# Patient Record
Sex: FEMALE | Race: WHITE | NOT HISPANIC OR LATINO | Employment: PART TIME | URBAN - METROPOLITAN AREA
[De-identification: names, ages, dates, MRNs, and addresses within clinical notes are randomized per-mention and may not be internally consistent; named-entity substitution may affect disease eponyms.]

---

## 2017-04-05 RX ORDER — FLUOXETINE HYDROCHLORIDE 40 MG/1
CAPSULE ORAL
Qty: 90 CAPSULE | Refills: 0 | Status: SHIPPED | OUTPATIENT
Start: 2017-04-05 | End: 2017-07-07 | Stop reason: SDUPTHER

## 2017-04-06 DIAGNOSIS — Z00.00 HEALTH CARE MAINTENANCE: Primary | ICD-10-CM

## 2017-06-15 ENCOUNTER — TELEPHONE (OUTPATIENT)
Dept: INTERNAL MEDICINE | Facility: CLINIC | Age: 52
End: 2017-06-15

## 2017-06-15 RX ORDER — FLUCONAZOLE 150 MG/1
TABLET ORAL
Qty: 2 TABLET | Refills: 1 | Status: SHIPPED | OUTPATIENT
Start: 2017-06-15 | End: 2017-09-01

## 2017-06-15 NOTE — TELEPHONE ENCOUNTER
----- Message from Maisha Nunn sent at 6/15/2017  8:41 AM EDT -----  Contact: pt - Dr Woodruff's pt - RE: new Rx  Pt calling and would like an abx called to pt's pharmacy. Pt informs had dental work and was given an abx that caused yeast infection. Pt would like a Rx for Diflucan. Could you please call to pharmacy? Please advise? Thanks      Diflucan    [facility] [place] [address] AT [facility] [address] - [phone] PH - [phone] (Phone)  [phone] (Fax)    Pt # [phone]

## 2017-06-22 DIAGNOSIS — G35 MULTIPLE SCLEROSIS (HCC): Primary | ICD-10-CM

## 2017-07-10 RX ORDER — FLUOXETINE HYDROCHLORIDE 40 MG/1
CAPSULE ORAL
Qty: 90 CAPSULE | Refills: 0 | Status: SHIPPED | OUTPATIENT
Start: 2017-07-10 | End: 2017-09-01 | Stop reason: SDUPTHER

## 2017-08-21 ENCOUNTER — TELEPHONE (OUTPATIENT)
Dept: INTERNAL MEDICINE | Facility: CLINIC | Age: 52
End: 2017-08-21

## 2017-08-21 DIAGNOSIS — E55.9 VITAMIN D DEFICIENCY: Primary | ICD-10-CM

## 2017-08-21 NOTE — TELEPHONE ENCOUNTER
----- Message from Maisha ISELA Nunn sent at 8/21/2017 11:48 AM EDT -----  Contact: pt - Dr Woodruff's pt - RE: Vit D added to lab order  Pt calling and would like to know if pt can have Vit D added to lab order. Pt informs has MS and would like to have checked. Pt has appt tomorrow at 2:15pm. Could you please add to pt's lab order? Please advise. Thanks      Pt # [phone]

## 2017-08-22 ENCOUNTER — LAB (OUTPATIENT)
Dept: INTERNAL MEDICINE | Facility: CLINIC | Age: 52
End: 2017-08-22

## 2017-08-22 DIAGNOSIS — Z00.00 HEALTH CARE MAINTENANCE: ICD-10-CM

## 2017-08-22 DIAGNOSIS — E55.9 VITAMIN D DEFICIENCY: ICD-10-CM

## 2017-08-22 LAB
25(OH)D3 SERPL-MCNC: 22.3 NG/ML (ref 30–100)
ALBUMIN SERPL-MCNC: 3.88 G/DL (ref 3.4–4.6)
ALBUMIN/GLOB SERPL: 1.1 G/DL
ALP SERPL-CCNC: 115 U/L (ref 46–116)
ALT SERPL W P-5'-P-CCNC: 21 U/L (ref 14–59)
ANION GAP SERPL CALCULATED.3IONS-SCNC: 12 MMOL/L
AST SERPL-CCNC: 13 U/L (ref 7–37)
BACTERIA UR QL AUTO: ABNORMAL /HPF
BASOPHILS # BLD AUTO: 0.01 10*3/MM3 (ref 0–0.2)
BASOPHILS NFR BLD AUTO: 0.1 % (ref 0–2)
BILIRUB SERPL-MCNC: 0.4 MG/DL (ref 0.2–1)
BILIRUB UR QL STRIP: NEGATIVE
BUN BLD-MCNC: 16 MG/DL (ref 6–22)
BUN/CREAT SERPL: 17.8 (ref 7–25)
CALCIUM SPEC-SCNC: 9.2 MG/DL (ref 8.6–10.5)
CHLORIDE SERPL-SCNC: 106 MMOL/L (ref 95–107)
CHOLEST SERPL-MCNC: 228 MG/DL (ref 0–200)
CLARITY UR: CLEAR
CO2 SERPL-SCNC: 24 MMOL/L (ref 23–32)
COLOR UR: YELLOW
CREAT BLD-MCNC: 0.9 MG/DL (ref 0.55–1.02)
DEPRECATED RDW RBC AUTO: 41.4 FL (ref 37–54)
EOSINOPHIL # BLD AUTO: 0.23 10*3/MM3 (ref 0–0.7)
EOSINOPHIL NFR BLD AUTO: 3.2 % (ref 0–5)
ERYTHROCYTE [DISTWIDTH] IN BLOOD BY AUTOMATED COUNT: 12.7 % (ref 11.5–15)
GFR SERPL CREATININE-BSD FRML MDRD: 65 ML/MIN/1.73
GLOBULIN UR ELPH-MCNC: 3.5 GM/DL
GLUCOSE BLD-MCNC: 90 MG/DL (ref 70–100)
GLUCOSE UR STRIP-MCNC: NEGATIVE MG/DL
HCT VFR BLD AUTO: 38.8 % (ref 34.1–44.9)
HDLC SERPL-MCNC: 62 MG/DL (ref 40–81)
HGB BLD-MCNC: 13.1 G/DL (ref 11.2–15.7)
HGB UR QL STRIP.AUTO: ABNORMAL
HYALINE CASTS UR QL AUTO: ABNORMAL /LPF
KETONES UR QL STRIP: NEGATIVE
LDLC SERPL CALC-MCNC: 148 MG/DL (ref 0–100)
LDLC/HDLC SERPL: 2.39 {RATIO}
LEUKOCYTE ESTERASE UR QL STRIP.AUTO: NEGATIVE
LYMPHOCYTES # BLD AUTO: 3.17 10*3/MM3 (ref 0.8–7)
LYMPHOCYTES NFR BLD AUTO: 44 % (ref 10–60)
MCH RBC QN AUTO: 30.8 PG (ref 26–34)
MCHC RBC AUTO-ENTMCNC: 33.8 G/DL (ref 31–37)
MCV RBC AUTO: 91.1 FL (ref 80–100)
MONOCYTES # BLD AUTO: 0.46 10*3/MM3 (ref 0–1)
MONOCYTES NFR BLD AUTO: 6.4 % (ref 0–13)
NEUTROPHILS # BLD AUTO: 3.33 10*3/MM3 (ref 1–11)
NEUTROPHILS NFR BLD AUTO: 46.3 % (ref 30–85)
NITRITE UR QL STRIP: NEGATIVE
PH UR STRIP.AUTO: 5.5 [PH] (ref 5–8)
PLATELET # BLD AUTO: 276 10*3/MM3 (ref 150–450)
PMV BLD AUTO: 10.4 FL (ref 6–12)
POTASSIUM BLD-SCNC: 4 MMOL/L (ref 3.3–5.3)
PROT SERPL-MCNC: 7.4 G/DL (ref 6.3–8.4)
PROT UR QL STRIP: NEGATIVE
RBC # BLD AUTO: 4.26 10*6/MM3 (ref 3.93–5.22)
RBC # UR: ABNORMAL /HPF
REF LAB TEST METHOD: ABNORMAL
SODIUM BLD-SCNC: 142 MMOL/L (ref 136–145)
SP GR UR STRIP: >=1.03 (ref 1–1.03)
SQUAMOUS #/AREA URNS HPF: ABNORMAL /HPF
TRIGL SERPL-MCNC: 89 MG/DL (ref 0–150)
TSH SERPL-ACNC: 1.84 MIU/ML (ref 0.27–4.2)
UROBILINOGEN UR QL STRIP: ABNORMAL
VLDLC SERPL-MCNC: 17.8 MG/DL
WBC NRBC COR # BLD: 7.2 10*3/MM3 (ref 5–10)
WBC UR QL AUTO: ABNORMAL /HPF

## 2017-08-22 PROCEDURE — 85025 COMPLETE CBC W/AUTO DIFF WBC: CPT | Performed by: INTERNAL MEDICINE

## 2017-08-22 PROCEDURE — 80061 LIPID PANEL: CPT | Performed by: INTERNAL MEDICINE

## 2017-08-22 PROCEDURE — 80053 COMPREHEN METABOLIC PANEL: CPT | Performed by: INTERNAL MEDICINE

## 2017-08-22 PROCEDURE — 81001 URINALYSIS AUTO W/SCOPE: CPT | Performed by: INTERNAL MEDICINE

## 2017-09-01 ENCOUNTER — OFFICE VISIT (OUTPATIENT)
Dept: INTERNAL MEDICINE | Facility: CLINIC | Age: 52
End: 2017-09-01

## 2017-09-01 VITALS
BODY MASS INDEX: 43.71 KG/M2 | WEIGHT: 272 LBS | RESPIRATION RATE: 14 BRPM | HEIGHT: 66 IN | SYSTOLIC BLOOD PRESSURE: 138 MMHG | DIASTOLIC BLOOD PRESSURE: 80 MMHG

## 2017-09-01 DIAGNOSIS — Z12.31 VISIT FOR SCREENING MAMMOGRAM: ICD-10-CM

## 2017-09-01 DIAGNOSIS — F41.8 MIXED ANXIETY DEPRESSIVE DISORDER: ICD-10-CM

## 2017-09-01 DIAGNOSIS — Z78.0 POST-MENOPAUSE: ICD-10-CM

## 2017-09-01 DIAGNOSIS — R31.29 MICROSCOPIC HEMATURIA: ICD-10-CM

## 2017-09-01 DIAGNOSIS — G47.33 OBSTRUCTIVE SLEEP APNEA SYNDROME: ICD-10-CM

## 2017-09-01 DIAGNOSIS — E78.00 PURE HYPERCHOLESTEROLEMIA: ICD-10-CM

## 2017-09-01 DIAGNOSIS — Z23 NEED FOR PNEUMOCOCCAL VACCINATION: ICD-10-CM

## 2017-09-01 DIAGNOSIS — E55.9 VITAMIN D DEFICIENCY: ICD-10-CM

## 2017-09-01 DIAGNOSIS — Z00.00 HEALTH CARE MAINTENANCE: Primary | ICD-10-CM

## 2017-09-01 PROCEDURE — 90471 IMMUNIZATION ADMIN: CPT | Performed by: INTERNAL MEDICINE

## 2017-09-01 PROCEDURE — 99396 PREV VISIT EST AGE 40-64: CPT | Performed by: INTERNAL MEDICINE

## 2017-09-01 PROCEDURE — 90732 PPSV23 VACC 2 YRS+ SUBQ/IM: CPT | Performed by: INTERNAL MEDICINE

## 2017-09-01 RX ORDER — ATORVASTATIN CALCIUM 10 MG/1
10 TABLET, FILM COATED ORAL DAILY
Qty: 90 TABLET | Refills: 3 | Status: SHIPPED | OUTPATIENT
Start: 2017-09-01

## 2017-09-01 RX ORDER — CYCLOBENZAPRINE HCL 10 MG
TABLET ORAL
COMMUNITY
Start: 2017-07-11

## 2017-09-01 RX ORDER — FLUOXETINE HYDROCHLORIDE 20 MG/1
60 CAPSULE ORAL DAILY
Qty: 270 CAPSULE | Refills: 3 | Status: SHIPPED | OUTPATIENT
Start: 2017-09-01 | End: 2018-10-18 | Stop reason: SDUPTHER

## 2017-09-01 NOTE — PATIENT INSTRUCTIONS
Risk evaluation:  1. Cardiovascular risk factors: hyperlipidemia, tobacco abuse, obesity, sedentary life style . As patient is a smoker, her LDL target sis below 70. As her LDL is 148, will start Atorvastatin and reevaluate in 3 months.  2. Diabetes risk factors: obesity and sedentary life style.  3. Cancer risk factors: tobacco smoking.  4. Risky behavior: none. Use of seat belts: regular. Use of sunscreens: none, patient states that practices sun avoidance.  Tattoos: none. H/o blood transfusions/organ transplants before 1992 or clotting factor transfusion before 1987: none.     Prevention:  Cholesterol test  up to date. See discussion above, will start Atorvastatin.  Blood sugar test up to date. Normal fasting blood sugar.  Hep C testing (for patients born [birth year]79): discussed and recommended, will proceed with testing..  Mammogram is due. Will schedule.. Breast self exams recommended once a month.  Colonoscopy: up to date. Recommended every 10 years. Next screening is due in 2023..  PAP smear : not recommended, as patient is s/p hysterectomy..  DEXA : is due, will schedule. Benefits explained..                         Hyperlipidemia - target LDL is below 70. Start Atorvastavin, avoid red meat.    RANJANA - still not rested in am - refer to .    Anxiety d/o with panic attacks - will try to increase Prozac dose to 60 mg a day.    Vitamin D deficiency -  Start over the counter it D3 2000 IU a day, needs to be taken with food.      Probable COPD - advised to quit tobacco, risks explained.     Microscopic hematuria - I had explained to the patient that tobacco smoking increases her risk of bladder cancer. Patient states that she and cysto done by  back in 2011, I  have no s-ms. Will refer back to urologist.

## 2017-09-01 NOTE — PROGRESS NOTES
"Isabel Nieves is a 52 y.o. female.     History of Present Illness   /80 (BP Location: Left arm, Patient Position: Sitting, Cuff Size: Adult)  Resp 14  Ht 65.5\" (166.4 cm)  Wt 272 lb (123 kg)  BMI 44.57 kg/m2  Patient is here for yearly CPE. Last CPE was  1 year ago.  PMH, SH and FH reviewed. Changes in the FH: none .  Patient rates her own health as: good.  Tobacco use: current smoker. Smokes 10 cigarettes a day.  Alcohol use: seldom.  Recreational drugs use: none  Medication list rewieved.  Diet: regular.  Exercise: none.  Marital status: .   Employment: full time.  Patient rates her stress level as: high. Just lost her mother who had deementia.  Dental health: good. Brushes teeth 2 times a day, flosses 1 time a day . Dental visits: 2 times a year .  Vision correction: glasses  Hearing: normal.    Recent vaccinations:   Flu  is up to date and recommended yearly  Tdap  is up to date  Zostavax not recommended at this age    Patient is postmenopausal. Past pregnancies: . Currently patient is on no HRT .      Current Outpatient Prescriptions:   •  cyclobenzaprine (FLEXERIL) 10 MG tablet, , Disp: , Rfl:   •  FLUoxetine (PROzac) 40 MG capsule, TAKE ONE CAPSULE BY MOUTH ONE TIME DAILY, Disp: 90 capsule, Rfl: 0  •  HYDROcodone-acetaminophen (NORCO) 7.5-325 MG per tablet, Take 1 tablet by mouth 2 (two) times a day., Disp: , Rfl:   •  ibuprofen (ADVIL,MOTRIN) 800 MG tablet, Take 1 tablet by mouth as needed., Disp: , Rfl:   •  Multiple Vitamin (MULTIVITAMINS PO), Take 1 tablet by mouth daily., Disp: , Rfl:           The following portions of the patient's history were reviewed and updated as appropriate: allergies, current medications, past family history, past medical history, past social history, past surgical history and problem list.    Review of Systems   Constitutional: Negative for chills and fever.   HENT: Negative for postnasal drip, sinus pressure and sore throat.    Eyes: " "Negative for pain and itching.   Respiratory: Negative for cough and chest tightness.    Cardiovascular: Positive for leg swelling. Negative for chest pain.   Gastrointestinal: Negative for abdominal pain and blood in stool.   Endocrine: Negative for cold intolerance and heat intolerance.   Genitourinary: Negative for difficulty urinating and flank pain.   Musculoskeletal: Positive for back pain and neck pain.   Skin: Negative for color change and rash.   Neurological: Positive for dizziness and headaches. Negative for weakness. Seizures: patient c/o absence seisures.   Hematological: Negative for adenopathy. Does not bruise/bleed easily.   Psychiatric/Behavioral: Positive for confusion (\"mental fog\"). Negative for sleep disturbance. The patient is nervous/anxious (has panic attacks).        Objective   Physical Exam   Constitutional: She is oriented to person, place, and time. She appears well-developed. No distress.   obese   HENT:   Head: Normocephalic and atraumatic.   Right Ear: External ear normal.   Left Ear: External ear normal.   Nose: Nose normal.   Mouth/Throat: Oropharynx is clear and moist. No oropharyngeal exudate.   Eyes: Conjunctivae and EOM are normal. Pupils are equal, round, and reactive to light. Right eye exhibits no discharge. Left eye exhibits no discharge. No scleral icterus.   Neck: Normal range of motion. Neck supple. No JVD present. No thyromegaly present.   Cardiovascular: Normal rate, regular rhythm, S1 normal, S2 normal, normal heart sounds and intact distal pulses.  Exam reveals no gallop and no friction rub.    No murmur heard.  Pulmonary/Chest: Effort normal. No respiratory distress. She has no decreased breath sounds. Wheezes: over the left base. She has no rhonchi. She has no rales. Right breast exhibits no inverted nipple, no mass, no nipple discharge, no skin change and no tenderness. Left breast exhibits no inverted nipple, no mass, no nipple discharge, no skin change and no " tenderness. Breasts are symmetrical.   Prolonged respiratory phase   Abdominal: Soft. Bowel sounds are normal. She exhibits no distension and no mass. There is no tenderness. There is no rebound and no guarding.   Musculoskeletal: She exhibits no edema.   Lymphadenopathy:        Head (right side): No submental, no submandibular, no preauricular, no posterior auricular and no occipital adenopathy present.        Head (left side): No submental, no submandibular, no preauricular, no posterior auricular and no occipital adenopathy present.     She has no cervical adenopathy.        Right cervical: No superficial cervical, no deep cervical and no posterior cervical adenopathy present.       Left cervical: No superficial cervical, no deep cervical and no posterior cervical adenopathy present.     She has no axillary adenopathy.        Right: No supraclavicular adenopathy present.        Left: No supraclavicular adenopathy present.   Neurological: She is alert and oriented to person, place, and time. She has normal reflexes. She displays normal reflexes. No cranial nerve deficit. She exhibits normal muscle tone. Coordination normal.   Reflex Scores:       Bicep reflexes are 2+ on the right side and 2+ on the left side.       Patellar reflexes are 2+ on the right side and 2+ on the left side.  Skin: Skin is warm. No rash noted. She is not diaphoretic. No erythema.   multicolored tattoo over the right ankle   Psychiatric: She has a normal mood and affect. Her behavior is normal. Thought content normal.   Vitals reviewed.      Assessment/Plan   Annemarie was seen today for annual exam.    Diagnoses and all orders for this visit:    Health care maintenance  -     Hepatitis C Antibody; Future    Need for pneumococcal vaccination  -     Pneumococcal Polysaccharide Vaccine 23-Valent Greater Than or Equal To 3yo Subcutaneous / IM    Microscopic hematuria  -     Ambulatory Referral to Urology    Pure hypercholesterolemia  -      Comprehensive Metabolic Panel; Future  -     Lipid Panel; Future  -     atorvastatin (LIPITOR) 10 MG tablet; Take 1 tablet by mouth Daily.    Vitamin D deficiency  -     Vitamin D 25 Hydroxy; Future    Post-menopause  -     DEXA Bone Density Axial; Future    Visit for screening mammogram  -     Mammo Screening Bilateral With CAD    Obstructive sleep apnea syndrome  -     Ambulatory Referral to Sleep Lab      Risk evaluation:  1. Cardiovascular risk factors: hyperlipidemia, tobacco abuse, obesity, sedentary life style . As patient is a smoker, her LDL target sis below 70. As her LDL is 148, will start Atorvastatin and reevaluate in 3 months.  2. Diabetes risk factors: obesity and sedentary life style.  3. Cancer risk factors: tobacco smoking.  4. Risky behavior: none. Use of seat belts: regular. Use of sunscreens: none, patient states that practices sun avoidance.  Tattoos: one. H/o blood transfusions/organ transplants before 1992 or clotting factor transfusion before 1987: none.     Prevention:  Cholesterol test  up to date. See discussion above, will start Atorvastatin.  Blood sugar test up to date. Normal fasting blood sugar.  Hep C testing (for patients born [birth year]): discussed and recommended, will proceed with testing..  Mammogram is due. Will schedule.. Breast self exams recommended once a month.  Colonoscopy: up to date. Recommended every 10 years. Next screening is due in 2023..  PAP smear : not recommended, as patient is s/p hysterectomy..  DEXA : is due, will schedule. Benefits explained.       Hyperlipidemia - target LDL is below 70. Start Atorvastavin, avoid red meat.    RANJANA - still not rested in am - refer to .    Anxiety d/o with panic attacks - will try to increase Prozac dose to 60 mg a day.    Vitamin D deficiency -  Start over the counter it D3 2000 IU a day, needs to be taken with food.      Probable COPD - advised to quit tobacco, risks explained.     Microscopic hematuria - I had  explained to the patient that tobacco smoking increases her risk of bladder cancer. Patient states that she and cysto done by  back in 2011, I  have no s-ms. Will refer back to urologist.

## 2017-09-15 ENCOUNTER — HOSPITAL ENCOUNTER (OUTPATIENT)
Dept: SLEEP MEDICINE | Facility: HOSPITAL | Age: 52
Discharge: HOME | End: 2017-09-15
Admitting: INTERNAL MEDICINE

## 2017-09-15 DIAGNOSIS — G47.33 OSA (OBSTRUCTIVE SLEEP APNEA): Primary | ICD-10-CM

## 2017-09-15 DIAGNOSIS — G47.33 OBSTRUCTIVE SLEEP APNEA SYNDROME: ICD-10-CM

## 2017-09-15 PROCEDURE — G0463 HOSPITAL OUTPT CLINIC VISIT: HCPCS

## 2017-09-15 NOTE — CONSULTS
[facility]  Telephone: [phone] / Fax: [phone] [place]  Telephone: [phone] / Fax: [phone] Vicki Huerta    Referring Physician: Dr. Mignon Woodruff  PCP: Mignon Woodruff MD    Reason for consult:  Sleep disorders of RANJANA    Annemarie Nieves was seen in the Sleep Disorders Center today.  52-year-old female previously seen at the [place] sleep [facility].  She had a sleep study in 2013 and was initially set up on a BiPAP.  However her BiPAP broke.  She then got a device from a friend which was a CPAP.  She has been using this now for the past 2 years.  She uses nasal pillows.  Her sleep time is 10:30 PM and wake time is 6:30 AM.  She wakes up feeling awful, confused and extremely sleepy.  Despite use of the CPAP at night she reports daytime drowsiness sleepiness.  She reports difficulty staying awake even with driving.  She has gained 50 pounds in the last 5 years.  She has sleep disruption caused by neuropathy.    Patient was recently diagnosed with multiple sclerosis.  She is also having some sort of petit mild seizures which are being managed by neurology.  She reports difficulty concentrating as well as poor memory.  She is on hydrocortisone but states that she is trying to cut this down.  She was recently started on medications for multiple sclerosis.  Her Pearland score is 9.    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  Multiple sclerosis, petit mall seizures.    Current Medications:    Current Outpatient Prescriptions:   •  atorvastatin (LIPITOR) 10 MG tablet, Take 1 tablet by mouth Daily., Disp: 90 tablet, Rfl: 3  •  cyclobenzaprine (FLEXERIL) 10 MG  "tablet, , Disp: , Rfl:   •  FLUoxetine (PROzac) 20 MG capsule, Take 3 capsules by mouth Daily., Disp: 270 capsule, Rfl: 3  •  HYDROcodone-acetaminophen (NORCO) 7.5-325 MG per tablet, Take 1 tablet by mouth 2 (two) times a day., Disp: , Rfl:   •  Multiple Vitamin (MULTIVITAMINS PO), Take 1 tablet by mouth daily., Disp: , Rfl:   Tecfidera... also listed in Sleep Questionnaire.    I have reviewed Past Medical History, Past Surgical History, Medication List, Social History and Family History as entered in Sleep Questionnaire and EPIC.  Family history significant for heart disease, high blood pressure, thyroid disease, COPD, asthma, obesity.  Patient works as a bank center representative.  She continues to smoke he cigarettes and has smoked half pack of cigarettes a day since age 12.  She is recently try to cut down and stop.  She does not drink any alcohol.  She does not drink any caffeine.    Pertinent Positive Review of Systems of painful joints, swollen ankles and legs, dizziness, anxiety, depression, diarrhea, always too warm.  Rest of Review of Systems was negative as recorded in Sleep Questionnaire.    Vital Signs: Ht. 65.5\"; Wt. 271lbs;  / 67; HR 68; BMI:46; Neck circumference: 16\"        General: Alert. Cooperative. Well developed. No acute distress.             Head:  Normocephalic. Symmetrical. Atraumatic.              Eyes: Sclera clear. No icterus. PERRLA. Normal EOM.             Ears: No deformities. Normal hearing.             Nose: No septal deviation. No drainage.          Throat: No oral lesions. No thrush. Moist mucous membranes.    Tongue is normal and dentition is normal       Pharynx: Posterior pharyngeal pillars are narrow with Mallampati score of Class III     Chest Wall:  Normal shape. Symmetric expansion with respiration. No tenderness.             Neck:  Trachea midline.           Lungs:  Clear to auscultation bilaterally. No wheezes. No rhonchi. No rales. Respirations regular, even and " unlabored.            Heart:  Regular rhythm and normal rate. Normal S1 and S2. No murmur.     Abdomen:  Soft, non-tender and non-distended. Normal bowel sounds. No masses.  Extremities:  Moves all extremities well. No edema.           Pulses: Pulses palpable and equal bilaterally.               Skin: Dry. Intact. No bleeding. No rash.           Neuro: Moves all 4 extremities and cranial nerves grossly intact.  Psychiatric: Normal mood and affect.    Impression:  · Obstructive sleep apnea on a CPAP, though previously prescribed BiPAP  · Persistent daytime sleepiness despite use of positive airway pressure device  · Morbid obesity  · Current use of sedating medications  · Recent diagnosis of multiple sclerosis  · Patient reports a petit mal seizure activity  · Current smoker, e-cigarettes and cigarettes    Plan:  This patient is currently on a device that she was not initially set up with.  She is using a friend's device.  Obviously requires a repeat study given weight gain and new diagnoses of multiple sclerosis and current use of sedating medications.  A split night study was scheduled in the lab.  Prescription of Ambien provided to help her sleep.  After study results are available she will be provided a new machine.  This will likely need to be a BiPAP given that her original prescription was for a BiPAP.  If this does not correct her helper hypersomnolence, she will then require further evaluation.  Some of her persistent daytime sleepiness could be due to her current neurological issues.  However obesity hypoventilation is also possible.  An ABG will be considered.  Furthermore patient is a current smoker.  This may be causing pulmonary issues such as hypoventilation, hypercapnia, hypoxia.  Again if patient fails to improve despite positive airway pressure therapy she will require further evaluation with lung function testing and as noted above with an ABG.    I will see her back in this office after completion  of study and set up with a new device if appropriate.    Thank you for allowing me to participate in your patient's care.    Rey Crow MD  [facility] PROVIDER SCHEDULE  9/15/2017    Part of this note may be an electronic transcription/translation of spoken language to printed text using the Dragon Dictation System.

## 2017-09-18 ENCOUNTER — APPOINTMENT (OUTPATIENT)
Dept: WOMENS IMAGING | Facility: HOSPITAL | Age: 52
End: 2017-09-18

## 2017-09-18 PROCEDURE — G0202 SCR MAMMO BI INCL CAD: HCPCS | Performed by: RADIOLOGY

## 2017-09-18 PROCEDURE — 77067 SCR MAMMO BI INCL CAD: CPT | Performed by: RADIOLOGY

## 2017-10-09 RX ORDER — FLUOXETINE HYDROCHLORIDE 40 MG/1
CAPSULE ORAL
Qty: 90 CAPSULE | Refills: 1 | Status: SHIPPED | OUTPATIENT
Start: 2017-10-09

## 2017-10-17 ENCOUNTER — TELEPHONE (OUTPATIENT)
Dept: INTERNAL MEDICINE | Facility: CLINIC | Age: 52
End: 2017-10-17

## 2017-10-17 NOTE — TELEPHONE ENCOUNTER
----- Message from Effie Morales MA sent at 10/17/2017  9:39 AM EDT -----  Pt says that she must delay scheduling Bone Density as she is having extreme side effects from new MS medications.  Please allow her time to adjust and she will call back to schedule in a few weeks.  Pt asks if you will kindly remove her from your call list

## 2017-11-29 ENCOUNTER — LAB (OUTPATIENT)
Dept: INTERNAL MEDICINE | Facility: CLINIC | Age: 52
End: 2017-11-29

## 2017-11-29 DIAGNOSIS — E78.00 PURE HYPERCHOLESTEROLEMIA: ICD-10-CM

## 2017-11-29 DIAGNOSIS — E55.9 VITAMIN D DEFICIENCY: ICD-10-CM

## 2017-11-29 LAB
25(OH)D3 SERPL-MCNC: 24.2 NG/ML (ref 30–100)
ALBUMIN SERPL-MCNC: 4 G/DL (ref 3.5–5.2)
ALBUMIN/GLOB SERPL: 1.4 G/DL
ALP SERPL-CCNC: 116 U/L (ref 39–117)
ALT SERPL W P-5'-P-CCNC: 14 U/L (ref 1–33)
ANION GAP SERPL CALCULATED.3IONS-SCNC: 13.4 MMOL/L
AST SERPL-CCNC: 14 U/L (ref 1–32)
BILIRUB SERPL-MCNC: 0.4 MG/DL (ref 0.1–1.2)
BUN BLD-MCNC: 13 MG/DL (ref 6–20)
BUN/CREAT SERPL: 16.7 (ref 7–25)
CALCIUM SPEC-SCNC: 9 MG/DL (ref 8.6–10.5)
CHLORIDE SERPL-SCNC: 102 MMOL/L (ref 98–107)
CHOLEST SERPL-MCNC: 208 MG/DL (ref 0–200)
CO2 SERPL-SCNC: 24.6 MMOL/L (ref 22–29)
CREAT BLD-MCNC: 0.78 MG/DL (ref 0.57–1)
GFR SERPL CREATININE-BSD FRML MDRD: 77 ML/MIN/1.73
GLOBULIN UR ELPH-MCNC: 2.9 GM/DL
GLUCOSE BLD-MCNC: 126 MG/DL (ref 65–99)
HDLC SERPL-MCNC: 67 MG/DL (ref 40–60)
LDLC SERPL CALC-MCNC: 117 MG/DL (ref 0–100)
LDLC/HDLC SERPL: 1.74 {RATIO}
POTASSIUM BLD-SCNC: 4 MMOL/L (ref 3.5–5.2)
PROT SERPL-MCNC: 6.9 G/DL (ref 6–8.5)
SODIUM BLD-SCNC: 140 MMOL/L (ref 136–145)
TRIGL SERPL-MCNC: 121 MG/DL (ref 0–150)
VLDLC SERPL-MCNC: 24.2 MG/DL (ref 5–40)

## 2017-11-29 PROCEDURE — 36415 COLL VENOUS BLD VENIPUNCTURE: CPT | Performed by: INTERNAL MEDICINE

## 2017-11-29 PROCEDURE — 80053 COMPREHEN METABOLIC PANEL: CPT | Performed by: INTERNAL MEDICINE

## 2017-11-29 PROCEDURE — 82306 VITAMIN D 25 HYDROXY: CPT | Performed by: INTERNAL MEDICINE

## 2017-11-29 PROCEDURE — 80061 LIPID PANEL: CPT | Performed by: INTERNAL MEDICINE

## 2017-11-29 RX ORDER — LEVETIRACETAM 750 MG/1
TABLET ORAL
Qty: 60 TABLET | Refills: 3 | Status: SHIPPED | OUTPATIENT
Start: 2017-11-29 | End: 2018-06-29 | Stop reason: SDUPTHER

## 2017-12-05 ENCOUNTER — OFFICE VISIT (OUTPATIENT)
Dept: INTERNAL MEDICINE | Facility: CLINIC | Age: 52
End: 2017-12-05

## 2017-12-05 VITALS
DIASTOLIC BLOOD PRESSURE: 72 MMHG | WEIGHT: 267 LBS | BODY MASS INDEX: 42.91 KG/M2 | SYSTOLIC BLOOD PRESSURE: 116 MMHG | HEIGHT: 66 IN

## 2017-12-05 DIAGNOSIS — R73.01 IMPAIRED FASTING BLOOD SUGAR: ICD-10-CM

## 2017-12-05 DIAGNOSIS — G47.33 OBSTRUCTIVE SLEEP APNEA SYNDROME: ICD-10-CM

## 2017-12-05 DIAGNOSIS — Z23 NEED FOR VACCINATION: ICD-10-CM

## 2017-12-05 DIAGNOSIS — E78.00 PURE HYPERCHOLESTEROLEMIA: Primary | ICD-10-CM

## 2017-12-05 DIAGNOSIS — E55.9 VITAMIN D DEFICIENCY: ICD-10-CM

## 2017-12-05 PROCEDURE — 90656 IIV3 VACC NO PRSV 0.5 ML IM: CPT | Performed by: INTERNAL MEDICINE

## 2017-12-05 PROCEDURE — 99214 OFFICE O/P EST MOD 30 MIN: CPT | Performed by: INTERNAL MEDICINE

## 2017-12-05 PROCEDURE — 90471 IMMUNIZATION ADMIN: CPT | Performed by: INTERNAL MEDICINE

## 2017-12-05 RX ORDER — ERGOCALCIFEROL 1.25 MG/1
50000 CAPSULE ORAL
Qty: 4 CAPSULE | Refills: 11 | Status: SHIPPED | OUTPATIENT
Start: 2017-12-05 | End: 2018-09-17 | Stop reason: SDUPTHER

## 2017-12-05 RX ORDER — ERGOCALCIFEROL 1.25 MG/1
CAPSULE ORAL
Refills: 0 | COMMUNITY
Start: 2017-09-19 | End: 2017-12-05 | Stop reason: SDUPTHER

## 2017-12-05 NOTE — PROGRESS NOTES
"Subjective   Annemarie Nieves is a 52 y.o. female. Here for hyperlipidemia, RANJANA, Vit D deficiency, microscopic hematuria f/u.    History of Present Illness     /72 (BP Location: Left arm, Patient Position: Sitting, Cuff Size: Adult)  Ht 166.4 cm (65.5\")  Wt 121 kg (267 lb)  BMI 43.76 kg/m2    Current Outpatient Prescriptions:   •  atorvastatin (LIPITOR) 10 MG tablet, Take 1 tablet by mouth Daily., Disp: 90 tablet, Rfl: 3  •  cyclobenzaprine (FLEXERIL) 10 MG tablet, , Disp: , Rfl:   •  FLUoxetine (PROzac) 20 MG capsule, Take 3 capsules by mouth Daily., Disp: 270 capsule, Rfl: 3  •  FLUoxetine (PROzac) 40 MG capsule, TAKE ONE CAPSULE BY MOUTH ONE TIME DAILY, Disp: 90 capsule, Rfl: 1  •  HYDROcodone-acetaminophen (NORCO) 7.5-325 MG per tablet, Take 1 tablet by mouth 2 (two) times a day., Disp: , Rfl:   •  levETIRAcetam (KEPPRA) 750 MG tablet, TAKE 1 TABLET BY MOUTH TWICE A DAY, Disp: 60 tablet, Rfl: 3  •  Multiple Vitamin (MULTIVITAMINS PO), Take 1 tablet by mouth daily., Disp: , Rfl:   •  vitamin D (ERGOCALCIFEROL) 73980 units capsule capsule, TAKE 1 CAPSULE BY MOUTH every 7 days, Disp: , Rfl: 0    Patient has been diagnosed with hyperlipidemia. Target LDL is < 100 mg/dl (CHD or \"CHD risk equivalent\" is present). Patient is on low fat diet with fair compliance. Patient is not currently being treated with statin medication. I had started her on Atorvastatin in September and she states that she took it up till 11/12/2017, but then had discontinued as she was afraid of developing more muscle pains. Still smokes, but reports that less. Exercise used to exercise regularly, but lately stopped exercise due to pain.    Patient had been diagnosed with Vitamin D deficiency. Takes over the counter Vit D3 2000 IU a day. Patient has complaints of muscle or bone aches. Balance is fair. No recent falls. Patient informs me that in a past she used to take once a week 62327 IU Vit D with good results.    Patient had been " "referred for sleep study,  But had cancelled the study as \"I didn't care for their sleep clinic\". She states that she is compliant with nightly use of C-PAP as it is. Patient states that her neurologist.    Patient had been noticed to have microscopic hematuria at her last visit. She was referred to urogynecologist and reportedly had negative exam. I have no records.    The following portions of the patient's history were reviewed and updated as appropriate: allergies, current medications, past family history, past medical history, past social history, past surgical history and problem list.    Review of Systems   Constitutional: Negative for chills and fever.   Eyes: Negative for pain and redness.   Respiratory: Negative for cough and shortness of breath.    Cardiovascular: Negative for chest pain and leg swelling.   Neurological: Negative for dizziness and headaches.       Objective   Physical Exam   Constitutional: She is oriented to person, place, and time. She appears well-developed.   obese   HENT:   Head: Normocephalic and atraumatic.   Right Ear: Tympanic membrane, external ear and ear canal normal.   Left Ear: Tympanic membrane, external ear and ear canal normal.   Nose: Nose normal. Right sinus exhibits no maxillary sinus tenderness and no frontal sinus tenderness. Left sinus exhibits no maxillary sinus tenderness and no frontal sinus tenderness.   Mouth/Throat: Uvula is midline, oropharynx is clear and moist and mucous membranes are normal.   Eyes: Conjunctivae and EOM are normal. Pupils are equal, round, and reactive to light. Right eye exhibits no discharge. Left eye exhibits no discharge. No scleral icterus.   Neck: Neck supple. No JVD present.   Cardiovascular: Normal rate, regular rhythm and normal heart sounds.  Exam reveals no gallop and no friction rub.    No murmur heard.  Pulmonary/Chest: Effort normal and breath sounds normal. She has no wheezes. She has no rales.   Musculoskeletal: She " "exhibits no edema.   Lymphadenopathy:     She has no cervical adenopathy.   Neurological: She is alert and oriented to person, place, and time. No cranial nerve deficit.   Skin: Skin is warm and dry. No rash noted.   Psychiatric: She has a normal mood and affect. Her behavior is normal.   Vitals reviewed.      Assessment/Plan   Annemarie was seen today for hyperlipidemia and vitamin d deficiency.    Diagnoses and all orders for this visit:    Pure hypercholesterolemia    Vitamin D deficiency  -     vitamin D (ERGOCALCIFEROL) 41631 units capsule capsule; Take 1 capsule by mouth Every 7 (Seven) Days.  -     Vitamin D 25 Hydroxy; Future    Impaired fasting blood sugar  -     Comprehensive Metabolic Panel; Future  -     Hemoglobin A1c; Future    Obstructive sleep apnea syndrome      Hyperlipidemia - not well contrlled as patient is not compliant with use of Atorvastatin. Normal liver function tests. LDL target is below < 70 mg/dl (\"very high\" risk for CHD). Patient's 117. Will monitor. Needs to quit tobacco completely.  Impaired fasting blood sugar - patient is not sure if she actually was fasting for the blood test -  Low carb diet and regular exercise recommended. Weight loss will be very beneficial.  Vitamin D deficiency - still not well compensated with over the counter Vit D3. Continue same dose. Also will add once a week Vit D 44942 IU. This supplement is fat-soluble and has to be taken with meals.  RANJANA - continue nightly use of C-PAP.  Microscopic hematuria - reportedly had negative urological eval, will try to obtain records.       "

## 2017-12-05 NOTE — PATIENT INSTRUCTIONS
"Hyperlipidemia - not well contrlled as patient is not compliant with use of Atorvastatin. Normal liver function tests. LDL target is below < 70 mg/dl (\"very high\" risk for CHD). Patient's 117. Will monitor. Needs to quit tobacco completely.  Impaired fasting blood sugar - patient is not sure if she actually was fasting for the blood test -  Low carb diet and regular exercise recommended. Weight loss will be very beneficial.  Vitamin D deficiency - still not well compensated with over the counter Vit D3. Continue same dose. Also will add once a week Vit D 74106 IU. This supplement is fat-soluble and has to be taken with meals.  RANJANA - continue nightly use of C-PAP.  Microscopic hematuria - reportedly had negative urological eval, will try to obtain records.      "

## 2018-06-29 RX ORDER — LEVETIRACETAM 750 MG/1
TABLET ORAL
Qty: 60 TABLET | Refills: 3 | Status: SHIPPED | OUTPATIENT
Start: 2018-06-29

## 2018-09-17 DIAGNOSIS — E55.9 VITAMIN D DEFICIENCY: ICD-10-CM

## 2018-09-17 RX ORDER — ERGOCALCIFEROL 1.25 MG/1
CAPSULE ORAL
Qty: 4 CAPSULE | Refills: 0 | Status: SHIPPED | OUTPATIENT
Start: 2018-09-17

## 2018-10-10 ENCOUNTER — APPOINTMENT (OUTPATIENT)
Dept: WOMENS IMAGING | Facility: HOSPITAL | Age: 53
End: 2018-10-10

## 2018-10-10 PROCEDURE — 77067 SCR MAMMO BI INCL CAD: CPT | Performed by: RADIOLOGY

## 2018-10-18 DIAGNOSIS — F41.8 MIXED ANXIETY DEPRESSIVE DISORDER: ICD-10-CM

## 2018-10-18 RX ORDER — FLUOXETINE HYDROCHLORIDE 20 MG/1
CAPSULE ORAL
Qty: 270 CAPSULE | Refills: 1 | Status: SHIPPED | OUTPATIENT
Start: 2018-10-18

## 2019-01-28 ENCOUNTER — TELEPHONE (OUTPATIENT)
Dept: INTERNAL MEDICINE | Facility: CLINIC | Age: 54
End: 2019-01-28

## 2019-01-28 NOTE — TELEPHONE ENCOUNTER
Faxed pharmacy note stating medication can not be filled due to patient needing appointment has not been seen since 2017.

## 2019-01-28 NOTE — TELEPHONE ENCOUNTER
LVM for patient to call and schedule appt.  Patient called, spoke with Prema.  Patient now on Medicaid and is seeking another PCP.

## 2019-01-28 NOTE — TELEPHONE ENCOUNTER
----- Message from Nuvia Kelly sent at 1/24/2019  9:32 AM EST -----  Contact: [facility] calling to clarify prescription for FLUoxetine (PROzac) 20 MG capsule. Please advise    [facility]:[phone]

## 2019-02-04 ENCOUNTER — TELEPHONE (OUTPATIENT)
Dept: INTERNAL MEDICINE | Facility: CLINIC | Age: 54
End: 2019-02-04

## 2019-02-04 NOTE — TELEPHONE ENCOUNTER
Called Mrs Ha, left a very detailed message we have received a refill request from [facility] for refill of keppra. We are unable to refill this medication due to Mrs Ha now having medicaid insurnace, urged strongly to patient she needs to call physician that insurance has assigned to her and make an appointment right away so that she does not go without her medication.

## 2019-04-14 DIAGNOSIS — E55.9 VITAMIN D DEFICIENCY: ICD-10-CM

## 2019-04-15 RX ORDER — ERGOCALCIFEROL 1.25 MG/1
CAPSULE ORAL
Qty: 4 CAPSULE | Refills: 0 | OUTPATIENT
Start: 2019-04-15

## 2020-09-23 ENCOUNTER — APPOINTMENT (OUTPATIENT)
Dept: WOMENS IMAGING | Facility: HOSPITAL | Age: 55
End: 2020-09-23

## 2020-09-23 PROCEDURE — 77063 BREAST TOMOSYNTHESIS BI: CPT | Performed by: RADIOLOGY

## 2020-09-23 PROCEDURE — 77067 SCR MAMMO BI INCL CAD: CPT | Performed by: RADIOLOGY

## 2021-03-26 ENCOUNTER — BULK ORDERING (OUTPATIENT)
Dept: CASE MANAGEMENT | Facility: OTHER | Age: 56
End: 2021-03-26

## 2021-03-26 DIAGNOSIS — Z23 IMMUNIZATION DUE: ICD-10-CM

## 2022-12-13 ENCOUNTER — APPOINTMENT (OUTPATIENT)
Dept: WOMENS IMAGING | Facility: HOSPITAL | Age: 57
End: 2022-12-13
Payer: MEDICARE

## 2022-12-13 PROCEDURE — 77067 SCR MAMMO BI INCL CAD: CPT | Performed by: RADIOLOGY

## 2022-12-13 PROCEDURE — 77063 BREAST TOMOSYNTHESIS BI: CPT | Performed by: RADIOLOGY

## 2023-09-02 ENCOUNTER — APPOINTMENT (OUTPATIENT)
Dept: CT IMAGING | Facility: HOSPITAL | Age: 58
DRG: 098 | End: 2023-09-02
Payer: MEDICARE

## 2023-09-02 ENCOUNTER — HOSPITAL ENCOUNTER (INPATIENT)
Facility: HOSPITAL | Age: 58
LOS: 7 days | Discharge: NURSING, REHAB OR LONG-TERM CARE | DRG: 098 | End: 2023-09-12
Attending: EMERGENCY MEDICINE | Admitting: INTERNAL MEDICINE
Payer: MEDICARE

## 2023-09-02 DIAGNOSIS — G89.4 CHRONIC PAIN SYNDROME: ICD-10-CM

## 2023-09-02 DIAGNOSIS — G35 MS (MULTIPLE SCLEROSIS): ICD-10-CM

## 2023-09-02 DIAGNOSIS — R51.9 LEFT-SIDED HEADACHE: Primary | ICD-10-CM

## 2023-09-02 LAB
ALBUMIN SERPL-MCNC: 4.4 G/DL (ref 3.5–5.2)
ALBUMIN/GLOB SERPL: 2.1 G/DL
ALP SERPL-CCNC: 70 U/L (ref 39–117)
ALT SERPL W P-5'-P-CCNC: 12 U/L (ref 1–33)
ANION GAP SERPL CALCULATED.3IONS-SCNC: 12.5 MMOL/L (ref 5–15)
AST SERPL-CCNC: 9 U/L (ref 1–32)
B PARAPERT DNA SPEC QL NAA+PROBE: NOT DETECTED
B PERT DNA SPEC QL NAA+PROBE: NOT DETECTED
BASOPHILS # BLD AUTO: 0.01 10*3/MM3 (ref 0–0.2)
BASOPHILS NFR BLD AUTO: 0.1 % (ref 0–1.5)
BILIRUB SERPL-MCNC: 0.9 MG/DL (ref 0–1.2)
BUN SERPL-MCNC: 13 MG/DL (ref 8–23)
BUN/CREAT SERPL: 15.5 (ref 7–25)
C PNEUM DNA NPH QL NAA+NON-PROBE: NOT DETECTED
CALCIUM SPEC-SCNC: 9.2 MG/DL (ref 8.6–10.5)
CHLORIDE SERPL-SCNC: 99 MMOL/L (ref 98–107)
CO2 SERPL-SCNC: 23.5 MMOL/L (ref 22–29)
CREAT SERPL-MCNC: 0.84 MG/DL (ref 0.57–1)
CRP SERPL-MCNC: 0.95 MG/DL (ref 0–0.5)
D-LACTATE SERPL-SCNC: 1 MMOL/L (ref 0.5–2)
DEPRECATED RDW RBC AUTO: 41.7 FL (ref 37–54)
EGFRCR SERPLBLD CKD-EPI 2021: 79.7 ML/MIN/1.73
EOSINOPHIL # BLD AUTO: 0 10*3/MM3 (ref 0–0.4)
EOSINOPHIL NFR BLD AUTO: 0 % (ref 0.3–6.2)
ERYTHROCYTE [DISTWIDTH] IN BLOOD BY AUTOMATED COUNT: 12.8 % (ref 12.3–15.4)
ERYTHROCYTE [SEDIMENTATION RATE] IN BLOOD: 15 MM/HR (ref 0–30)
FLUAV SUBTYP SPEC NAA+PROBE: NOT DETECTED
FLUBV RNA ISLT QL NAA+PROBE: NOT DETECTED
GLOBULIN UR ELPH-MCNC: 2.1 GM/DL
GLUCOSE BLDC GLUCOMTR-MCNC: 127 MG/DL (ref 70–130)
GLUCOSE SERPL-MCNC: 116 MG/DL (ref 65–99)
HADV DNA SPEC NAA+PROBE: NOT DETECTED
HBA1C MFR BLD: 5 % (ref 4.8–5.6)
HCOV 229E RNA SPEC QL NAA+PROBE: NOT DETECTED
HCOV HKU1 RNA SPEC QL NAA+PROBE: NOT DETECTED
HCOV NL63 RNA SPEC QL NAA+PROBE: NOT DETECTED
HCOV OC43 RNA SPEC QL NAA+PROBE: NOT DETECTED
HCT VFR BLD AUTO: 35 % (ref 34–46.6)
HGB BLD-MCNC: 12.3 G/DL (ref 12–15.9)
HMPV RNA NPH QL NAA+NON-PROBE: NOT DETECTED
HPIV1 RNA ISLT QL NAA+PROBE: NOT DETECTED
HPIV2 RNA SPEC QL NAA+PROBE: NOT DETECTED
HPIV3 RNA NPH QL NAA+PROBE: NOT DETECTED
HPIV4 P GENE NPH QL NAA+PROBE: NOT DETECTED
IMM GRANULOCYTES # BLD AUTO: 0.02 10*3/MM3 (ref 0–0.05)
IMM GRANULOCYTES NFR BLD AUTO: 0.3 % (ref 0–0.5)
LYMPHOCYTES # BLD AUTO: 0.66 10*3/MM3 (ref 0.7–3.1)
LYMPHOCYTES NFR BLD AUTO: 8.3 % (ref 19.6–45.3)
M PNEUMO IGG SER IA-ACNC: NOT DETECTED
MAGNESIUM SERPL-MCNC: 2.2 MG/DL (ref 1.6–2.4)
MCH RBC QN AUTO: 31.4 PG (ref 26.6–33)
MCHC RBC AUTO-ENTMCNC: 35.1 G/DL (ref 31.5–35.7)
MCV RBC AUTO: 89.3 FL (ref 79–97)
MONOCYTES # BLD AUTO: 0.5 10*3/MM3 (ref 0.1–0.9)
MONOCYTES NFR BLD AUTO: 6.3 % (ref 5–12)
NEUTROPHILS NFR BLD AUTO: 6.73 10*3/MM3 (ref 1.7–7)
NEUTROPHILS NFR BLD AUTO: 85 % (ref 42.7–76)
NRBC BLD AUTO-RTO: 0 /100 WBC (ref 0–0.2)
PLATELET # BLD AUTO: 186 10*3/MM3 (ref 140–450)
PMV BLD AUTO: 10.7 FL (ref 6–12)
POTASSIUM SERPL-SCNC: 3.3 MMOL/L (ref 3.5–5.2)
PROCALCITONIN SERPL-MCNC: 0.04 NG/ML (ref 0–0.25)
PROT SERPL-MCNC: 6.5 G/DL (ref 6–8.5)
RBC # BLD AUTO: 3.92 10*6/MM3 (ref 3.77–5.28)
RHINOVIRUS RNA SPEC NAA+PROBE: NOT DETECTED
RSV RNA NPH QL NAA+NON-PROBE: NOT DETECTED
SARS-COV-2 RNA NPH QL NAA+NON-PROBE: NOT DETECTED
SODIUM SERPL-SCNC: 135 MMOL/L (ref 136–145)
TSH SERPL DL<=0.05 MIU/L-ACNC: 0.85 UIU/ML (ref 0.27–4.2)
WBC NRBC COR # BLD: 7.92 10*3/MM3 (ref 3.4–10.8)

## 2023-09-02 PROCEDURE — 84145 PROCALCITONIN (PCT): CPT | Performed by: EMERGENCY MEDICINE

## 2023-09-02 PROCEDURE — 99285 EMERGENCY DEPT VISIT HI MDM: CPT

## 2023-09-02 PROCEDURE — 87040 BLOOD CULTURE FOR BACTERIA: CPT | Performed by: EMERGENCY MEDICINE

## 2023-09-02 PROCEDURE — G0378 HOSPITAL OBSERVATION PER HR: HCPCS

## 2023-09-02 PROCEDURE — 25010000002 HYDROMORPHONE PER 4 MG: Performed by: EMERGENCY MEDICINE

## 2023-09-02 PROCEDURE — 82948 REAGENT STRIP/BLOOD GLUCOSE: CPT

## 2023-09-02 PROCEDURE — 25010000002 HYDROMORPHONE 1 MG/ML SOLUTION: Performed by: INTERNAL MEDICINE

## 2023-09-02 PROCEDURE — 83605 ASSAY OF LACTIC ACID: CPT | Performed by: EMERGENCY MEDICINE

## 2023-09-02 PROCEDURE — 70450 CT HEAD/BRAIN W/O DYE: CPT

## 2023-09-02 PROCEDURE — 85025 COMPLETE CBC W/AUTO DIFF WBC: CPT | Performed by: EMERGENCY MEDICINE

## 2023-09-02 PROCEDURE — 83036 HEMOGLOBIN GLYCOSYLATED A1C: CPT | Performed by: INTERNAL MEDICINE

## 2023-09-02 PROCEDURE — 99223 1ST HOSP IP/OBS HIGH 75: CPT | Performed by: PSYCHIATRY & NEUROLOGY

## 2023-09-02 PROCEDURE — 25010000002 DIHYDROERGOTAMINE MESYLATE PER 1 MG: Performed by: INTERNAL MEDICINE

## 2023-09-02 PROCEDURE — 83735 ASSAY OF MAGNESIUM: CPT | Performed by: INTERNAL MEDICINE

## 2023-09-02 PROCEDURE — 0202U NFCT DS 22 TRGT SARS-COV-2: CPT | Performed by: EMERGENCY MEDICINE

## 2023-09-02 PROCEDURE — 80053 COMPREHEN METABOLIC PANEL: CPT | Performed by: EMERGENCY MEDICINE

## 2023-09-02 PROCEDURE — 25010000002 METOCLOPRAMIDE PER 10 MG: Performed by: INTERNAL MEDICINE

## 2023-09-02 PROCEDURE — 36415 COLL VENOUS BLD VENIPUNCTURE: CPT

## 2023-09-02 PROCEDURE — 84443 ASSAY THYROID STIM HORMONE: CPT | Performed by: INTERNAL MEDICINE

## 2023-09-02 PROCEDURE — 25010000002 ENOXAPARIN PER 10 MG: Performed by: INTERNAL MEDICINE

## 2023-09-02 PROCEDURE — 86140 C-REACTIVE PROTEIN: CPT | Performed by: EMERGENCY MEDICINE

## 2023-09-02 PROCEDURE — 25010000002 ONDANSETRON PER 1 MG: Performed by: EMERGENCY MEDICINE

## 2023-09-02 PROCEDURE — 85652 RBC SED RATE AUTOMATED: CPT | Performed by: EMERGENCY MEDICINE

## 2023-09-02 RX ORDER — ASPIRIN 81 MG/1
81 TABLET ORAL DAILY
COMMUNITY

## 2023-09-02 RX ORDER — NITROGLYCERIN 0.4 MG/1
0.4 TABLET SUBLINGUAL
Status: DISCONTINUED | OUTPATIENT
Start: 2023-09-02 | End: 2023-09-12 | Stop reason: HOSPADM

## 2023-09-02 RX ORDER — ACETAMINOPHEN 325 MG/1
650 TABLET ORAL EVERY 4 HOURS PRN
Status: DISCONTINUED | OUTPATIENT
Start: 2023-09-02 | End: 2023-09-12 | Stop reason: HOSPADM

## 2023-09-02 RX ORDER — SODIUM CHLORIDE 0.9 % (FLUSH) 0.9 %
10 SYRINGE (ML) INJECTION AS NEEDED
Status: DISCONTINUED | OUTPATIENT
Start: 2023-09-02 | End: 2023-09-12 | Stop reason: HOSPADM

## 2023-09-02 RX ORDER — SODIUM CHLORIDE 0.9 % (FLUSH) 0.9 %
10 SYRINGE (ML) INJECTION EVERY 12 HOURS SCHEDULED
Status: DISCONTINUED | OUTPATIENT
Start: 2023-09-02 | End: 2023-09-12 | Stop reason: HOSPADM

## 2023-09-02 RX ORDER — FAMOTIDINE 20 MG/1
40 TABLET, FILM COATED ORAL DAILY
Status: DISCONTINUED | OUTPATIENT
Start: 2023-09-02 | End: 2023-09-12 | Stop reason: HOSPADM

## 2023-09-02 RX ORDER — ALBUTEROL SULFATE 90 UG/1
2 AEROSOL, METERED RESPIRATORY (INHALATION) EVERY 4 HOURS PRN
Status: DISCONTINUED | OUTPATIENT
Start: 2023-09-02 | End: 2023-09-12 | Stop reason: HOSPADM

## 2023-09-02 RX ORDER — BISACODYL 10 MG
10 SUPPOSITORY, RECTAL RECTAL DAILY PRN
Status: DISCONTINUED | OUTPATIENT
Start: 2023-09-02 | End: 2023-09-12 | Stop reason: HOSPADM

## 2023-09-02 RX ORDER — HYDROMORPHONE HYDROCHLORIDE 1 MG/ML
0.5 INJECTION, SOLUTION INTRAMUSCULAR; INTRAVENOUS; SUBCUTANEOUS ONCE
Status: COMPLETED | OUTPATIENT
Start: 2023-09-02 | End: 2023-09-02

## 2023-09-02 RX ORDER — ONDANSETRON 4 MG/1
4 TABLET, FILM COATED ORAL EVERY 6 HOURS PRN
Status: DISCONTINUED | OUTPATIENT
Start: 2023-09-02 | End: 2023-09-12 | Stop reason: HOSPADM

## 2023-09-02 RX ORDER — POTASSIUM CHLORIDE 750 MG/1
40 TABLET, FILM COATED, EXTENDED RELEASE ORAL EVERY 4 HOURS
Status: COMPLETED | OUTPATIENT
Start: 2023-09-02 | End: 2023-09-02

## 2023-09-02 RX ORDER — ASPIRIN 81 MG/1
81 TABLET ORAL DAILY
Status: DISCONTINUED | OUTPATIENT
Start: 2023-09-02 | End: 2023-09-12 | Stop reason: HOSPADM

## 2023-09-02 RX ORDER — CHOLECALCIFEROL (VITAMIN D3) 125 MCG
1000 CAPSULE ORAL DAILY
Status: DISCONTINUED | OUTPATIENT
Start: 2023-09-02 | End: 2023-09-12 | Stop reason: HOSPADM

## 2023-09-02 RX ORDER — NALOXONE HCL 0.4 MG/ML
0.4 VIAL (ML) INJECTION
Status: DISCONTINUED | OUTPATIENT
Start: 2023-09-02 | End: 2023-09-06

## 2023-09-02 RX ORDER — AMOXICILLIN 250 MG
2 CAPSULE ORAL 2 TIMES DAILY
Status: DISCONTINUED | OUTPATIENT
Start: 2023-09-02 | End: 2023-09-12 | Stop reason: HOSPADM

## 2023-09-02 RX ORDER — LANOLIN ALCOHOL/MO/W.PET/CERES
1000 CREAM (GRAM) TOPICAL DAILY
COMMUNITY

## 2023-09-02 RX ORDER — ALBUTEROL SULFATE 90 UG/1
2 AEROSOL, METERED RESPIRATORY (INHALATION) EVERY 4 HOURS PRN
COMMUNITY

## 2023-09-02 RX ORDER — POLYETHYLENE GLYCOL 3350 17 G/17G
17 POWDER, FOR SOLUTION ORAL DAILY PRN
Status: DISCONTINUED | OUTPATIENT
Start: 2023-09-02 | End: 2023-09-12 | Stop reason: HOSPADM

## 2023-09-02 RX ORDER — ONDANSETRON 2 MG/ML
4 INJECTION INTRAMUSCULAR; INTRAVENOUS ONCE
Status: COMPLETED | OUTPATIENT
Start: 2023-09-02 | End: 2023-09-02

## 2023-09-02 RX ORDER — BISACODYL 5 MG/1
5 TABLET, DELAYED RELEASE ORAL DAILY PRN
Status: DISCONTINUED | OUTPATIENT
Start: 2023-09-02 | End: 2023-09-12 | Stop reason: HOSPADM

## 2023-09-02 RX ORDER — ONDANSETRON 2 MG/ML
4 INJECTION INTRAMUSCULAR; INTRAVENOUS EVERY 6 HOURS PRN
Status: DISCONTINUED | OUTPATIENT
Start: 2023-09-02 | End: 2023-09-12 | Stop reason: HOSPADM

## 2023-09-02 RX ORDER — ACETAMINOPHEN 650 MG/1
650 SUPPOSITORY RECTAL EVERY 4 HOURS PRN
Status: DISCONTINUED | OUTPATIENT
Start: 2023-09-02 | End: 2023-09-12 | Stop reason: HOSPADM

## 2023-09-02 RX ORDER — LAMOTRIGINE 100 MG/1
200 TABLET ORAL 2 TIMES DAILY
COMMUNITY

## 2023-09-02 RX ORDER — SODIUM CHLORIDE 9 MG/ML
100 INJECTION, SOLUTION INTRAVENOUS CONTINUOUS
Status: DISCONTINUED | OUTPATIENT
Start: 2023-09-02 | End: 2023-09-03

## 2023-09-02 RX ORDER — METOCLOPRAMIDE HYDROCHLORIDE 5 MG/ML
10 INJECTION INTRAMUSCULAR; INTRAVENOUS EVERY 8 HOURS
Status: DISCONTINUED | OUTPATIENT
Start: 2023-09-02 | End: 2023-09-04

## 2023-09-02 RX ORDER — ERGOCALCIFEROL 1.25 MG/1
50000 CAPSULE ORAL WEEKLY
Status: DISCONTINUED | OUTPATIENT
Start: 2023-09-02 | End: 2023-09-12 | Stop reason: HOSPADM

## 2023-09-02 RX ORDER — CLONIDINE HYDROCHLORIDE 0.1 MG/1
0.1 TABLET ORAL NIGHTLY
Status: DISCONTINUED | OUTPATIENT
Start: 2023-09-02 | End: 2023-09-12 | Stop reason: HOSPADM

## 2023-09-02 RX ORDER — SODIUM CHLORIDE 9 MG/ML
40 INJECTION, SOLUTION INTRAVENOUS AS NEEDED
Status: DISCONTINUED | OUTPATIENT
Start: 2023-09-02 | End: 2023-09-12 | Stop reason: HOSPADM

## 2023-09-02 RX ORDER — LAMOTRIGINE 100 MG/1
200 TABLET ORAL 2 TIMES DAILY
Status: DISCONTINUED | OUTPATIENT
Start: 2023-09-02 | End: 2023-09-12 | Stop reason: HOSPADM

## 2023-09-02 RX ORDER — HYDROCODONE BITARTRATE AND ACETAMINOPHEN 7.5; 325 MG/1; MG/1
1 TABLET ORAL
Status: DISCONTINUED | OUTPATIENT
Start: 2023-09-02 | End: 2023-09-07

## 2023-09-02 RX ORDER — ENOXAPARIN SODIUM 100 MG/ML
40 INJECTION SUBCUTANEOUS DAILY
Status: DISCONTINUED | OUTPATIENT
Start: 2023-09-02 | End: 2023-09-03 | Stop reason: DRUGHIGH

## 2023-09-02 RX ORDER — ACETAMINOPHEN 160 MG/5ML
650 SOLUTION ORAL EVERY 4 HOURS PRN
Status: DISCONTINUED | OUTPATIENT
Start: 2023-09-02 | End: 2023-09-12 | Stop reason: HOSPADM

## 2023-09-02 RX ORDER — CLONIDINE HYDROCHLORIDE 0.1 MG/1
0.1 TABLET ORAL NIGHTLY
COMMUNITY

## 2023-09-02 RX ADMIN — HYDROMORPHONE HYDROCHLORIDE 1 MG: 1 INJECTION, SOLUTION INTRAMUSCULAR; INTRAVENOUS; SUBCUTANEOUS at 16:56

## 2023-09-02 RX ADMIN — FAMOTIDINE 40 MG: 20 TABLET, FILM COATED ORAL at 16:59

## 2023-09-02 RX ADMIN — SODIUM CHLORIDE 100 ML/HR: 9 INJECTION, SOLUTION INTRAVENOUS at 17:48

## 2023-09-02 RX ADMIN — CLONIDINE HYDROCHLORIDE 0.1 MG: 0.1 TABLET ORAL at 21:22

## 2023-09-02 RX ADMIN — POTASSIUM CHLORIDE 40 MEQ: 750 TABLET, EXTENDED RELEASE ORAL at 21:03

## 2023-09-02 RX ADMIN — Medication 10 ML: at 17:00

## 2023-09-02 RX ADMIN — ERGOCALCIFEROL 50000 UNITS: 1.25 CAPSULE ORAL at 21:23

## 2023-09-02 RX ADMIN — Medication 1000 MCG: at 16:59

## 2023-09-02 RX ADMIN — METOCLOPRAMIDE 10 MG: 5 INJECTION, SOLUTION INTRAMUSCULAR; INTRAVENOUS at 16:39

## 2023-09-02 RX ADMIN — ASPIRIN 81 MG: 81 TABLET, COATED ORAL at 16:59

## 2023-09-02 RX ADMIN — POTASSIUM CHLORIDE 40 MEQ: 750 TABLET, EXTENDED RELEASE ORAL at 16:59

## 2023-09-02 RX ADMIN — ONDANSETRON 4 MG: 2 INJECTION INTRAMUSCULAR; INTRAVENOUS at 11:28

## 2023-09-02 RX ADMIN — ACETAMINOPHEN 650 MG: 325 TABLET, FILM COATED ORAL at 23:34

## 2023-09-02 RX ADMIN — HYDROMORPHONE HYDROCHLORIDE 0.5 MG: 1 INJECTION, SOLUTION INTRAMUSCULAR; INTRAVENOUS; SUBCUTANEOUS at 11:29

## 2023-09-02 RX ADMIN — DIHYDROERGOTAMINE MESYLATE 0.5 MG: 1 INJECTION, SOLUTION INTRAMUSCULAR; INTRAVENOUS; SUBCUTANEOUS at 16:56

## 2023-09-02 RX ADMIN — SENNOSIDES AND DOCUSATE SODIUM 2 TABLET: 50; 8.6 TABLET ORAL at 21:04

## 2023-09-02 RX ADMIN — Medication 10 ML: at 21:06

## 2023-09-02 RX ADMIN — LAMOTRIGINE 200 MG: 100 TABLET ORAL at 21:23

## 2023-09-02 RX ADMIN — ENOXAPARIN SODIUM 40 MG: 100 INJECTION SUBCUTANEOUS at 21:04

## 2023-09-02 NOTE — PLAN OF CARE
AAOX4. SR. RA. Admitted to [address] [place] [address] with c/o chronic back pain and headache. DHE given for headache. Patient tolerated well. PRN Pain medication given for back pain. Heels and buttocks blanchable. Home C-pap brought by patient and at bedside. MRI screening form completed and faxed down.       Problem: Fall Injury Risk  Goal: Absence of Fall and Fall-Related Injury  Outcome: Ongoing, Progressing  Intervention: Identify and Manage Contributors  Recent Flowsheet Documentation  Taken 9/2/2023 1809 by Thao Vann RN  Medication Review/Management:   high-risk medications identified   infusion initiated  Taken 9/2/2023 1805 by Thao Vann RN  Medication Review/Management: medications reviewed  Taken 9/2/2023 1600 by Thao Vann RN  Medication Review/Management: medications reviewed  Taken 9/2/2023 1422 by Thao Vann RN  Medication Review/Management: medications reviewed  Intervention: Promote Injury-Free Environment  Recent Flowsheet Documentation  Taken 9/2/2023 1805 by Thao Vann RN  Safety Promotion/Fall Prevention:   safety round/check completed   room organization consistent   nonskid shoes/slippers when out of bed   lighting adjusted   clutter free environment maintained   activity supervised   assistive device/personal items within reach  Taken 9/2/2023 1600 by Thao Vann RN  Safety Promotion/Fall Prevention:   safety round/check completed   room organization consistent   nonskid shoes/slippers when out of bed   lighting adjusted   clutter free environment maintained   assistive device/personal items within reach   activity supervised  Taken 9/2/2023 1422 by hTao Vann RN  Safety Promotion/Fall Prevention:   safety round/check completed   room organization consistent   nonskid shoes/slippers when out of bed   mobility aid in reach   lighting adjusted   clutter free environment maintained   assistive device/personal items within reach   activity supervised     Problem:  Pain Chronic (Persistent) (Comorbidity Management)  Goal: Acceptable Pain Control and Functional Ability  Outcome: Ongoing, Progressing  Intervention: Manage Persistent Pain  Flowsheets  Taken 9/2/2023 1809  Medication Review/Management:   high-risk medications identified   infusion initiated  Taken 9/2/2023 1805  Medication Review/Management: medications reviewed  Taken 9/2/2023 1600  Medication Review/Management: medications reviewed  Taken 9/2/2023 1422  Medication Review/Management: medications reviewed  Intervention: Develop Pain Management Plan  Flowsheets  Taken 9/2/2023 1809  Pain Management Interventions:   see MAR   quiet environment facilitated  Taken 9/2/2023 1422  Pain Management Interventions:   quiet environment facilitated   position adjusted   pillow support provided  Intervention: Optimize Psychosocial Wellbeing  Recent Flowsheet Documentation  Taken 9/2/2023 1422 by Thao Vann, RN  Diversional Activities: television

## 2023-09-02 NOTE — ED NOTES
Pt arriving from home via EMS. Headache started last night. Pt stating that this is the worst headache of her life. Describing the HA as more left sided and goes to the back of her head.
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
## 2023-09-02 NOTE — ED NOTES
Nursing report ED to floor  Annemarie Ha  58 y.o.  female    HPI :   Chief Complaint   Patient presents with    Headache     Worst HA of Life       Admitting doctor:   No admitting provider for patient encounter.    Admitting diagnosis:   The primary encounter diagnosis was Left-sided headache. A diagnosis of MS (multiple sclerosis) was also pertinent to this visit.    Code status:   Current Code Status       Date Active Code Status Order ID Comments User Context       Not on file            Allergies:   Glatiramer, Diclofenac, and Glatiramer acetate    Isolation:   No active isolations    Intake and Output  No intake or output data in the 24 hours ending 09/02/23 1257    Weight:       09/02/23  0943   Weight: 120 kg (264 lb 8.8 oz)       Most recent vitals:   Vitals:    09/02/23 1125 09/02/23 1131 09/02/23 1202 09/02/23 1231   BP:  118/58 111/70 118/77   BP Location:       Patient Position:       Pulse: 61 59 58 56   Resp:       Temp:       SpO2: 95% 96% 95% 94%   Weight:       Height:           Active LDAs/IV Access:   Lines, Drains & Airways       Active LDAs       Name Placement date Placement time Site Days    Peripheral IV 09/02/23 0953 Left Antecubital 09/02/23  0953  Antecubital  less than 1                    Labs (abnormal labs have a star):   Labs Reviewed   COMPREHENSIVE METABOLIC PANEL - Abnormal; Notable for the following components:       Result Value    Glucose 116 (*)     Sodium 135 (*)     Potassium 3.3 (*)     All other components within normal limits    Narrative:     GFR Normal >60  Chronic Kidney Disease <60  Kidney Failure <15     CBC WITH AUTO DIFFERENTIAL - Abnormal; Notable for the following components:    Neutrophil % 85.0 (*)     Lymphocyte % 8.3 (*)     Eosinophil % 0.0 (*)     Lymphocytes, Absolute 0.66 (*)     All other components within normal limits   RESPIRATORY PANEL PCR W/ COVID-19 (SARS-COV-2) MARLENI/FRAN/MICHELA/PAD/COR/MAD/IVAN IN-HOUSE, NP SWAB IN UTM/VTP, 3-4 HR TAT - Normal  "   Narrative:     In the setting of a positive respiratory panel with a viral infection PLUS a negative procalcitonin without other underlying concern for bacterial infection, consider observing off antibiotics or discontinuation of antibiotics and continue supportive care. If the respiratory panel is positive for atypical bacterial infection (Bordetella pertussis, Chlamydophila pneumoniae, or Mycoplasma pneumoniae), consider antibiotic de-escalation to target atypical bacterial infection.   LACTIC ACID, PLASMA - Normal   PROCALCITONIN - Normal    Narrative:     As a Marker for Sepsis (Non-Neonates):    1. <0.5 ng/mL represents a low risk of severe sepsis and/or septic shock.  2. >2 ng/mL represents a high risk of severe sepsis and/or septic shock.    As a Marker for Lower Respiratory Tract Infections that require antibiotic therapy:    PCT on Admission    Antibiotic Therapy       6-12 Hrs later    >0.5                Strongly Recommended  >0.25 - <0.5        Recommended   0.1 - 0.25          Discouraged              Remeasure/reassess PCT  <0.1                Strongly Discouraged     Remeasure/reassess PCT    As 28 day mortality risk marker: \"Change in Procalcitonin Result\" (>80% or <=80%) if Day 0 (or Day 1) and Day 4 values are available. Refer to http://www.[employer]-pct-calculator.com    Change in PCT <=80%  A decrease of PCT levels below or equal to 80% defines a positive change in PCT test result representing a higher risk for 28-day all-cause mortality of patients diagnosed with severe sepsis for septic shock.    Change in PCT >80%  A decrease of PCT levels of more than 80% defines a negative change in PCT result representing a lower risk for 28-day all-cause mortality of patients diagnosed with severe sepsis or septic shock.      SEDIMENTATION RATE - Normal   POCT GLUCOSE FINGERSTICK - Normal   BLOOD CULTURE   BLOOD CULTURE   C-REACTIVE PROTEIN   CBC AND DIFFERENTIAL    Narrative:     The following orders were " created for panel order CBC & Differential.  Procedure                               Abnormality         Status                     ---------                               -----------         ------                     CBC Auto Differential[[id number]]        Abnormal            Final result                 Please view results for these tests on the individual orders.       EKG:   No orders to display       Meds given in ED:   Medications   sodium chloride 0.9 % flush 10 mL (has no administration in time range)   HYDROmorphone (DILAUDID) injection 0.5 mg (0.5 mg Intravenous Given 9/2/23 1129)   ondansetron (ZOFRAN) injection 4 mg (4 mg Intravenous Given 9/2/23 1128)       Imaging results:  CT Head Without Contrast    Result Date: 9/2/2023  Negative head CT.  Radiation dose reduction techniques were utilized, including automated exposure control and exposure modulation based on body size.   This report was finalized on 9/2/2023 11:08 AM by Dr. Loco Barlow M.D.       Ambulatory status:   - assist    Social issues:   Social History     Socioeconomic History    Marital status:    Tobacco Use    Smoking status: Every Day   Substance and Sexual Activity    Alcohol use: Yes       NIH Stroke Scale:       Chloé Ellsworth RN  09/02/23 12:57 EDT
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
## 2023-09-03 ENCOUNTER — APPOINTMENT (OUTPATIENT)
Dept: CT IMAGING | Facility: HOSPITAL | Age: 58
DRG: 098 | End: 2023-09-03
Payer: MEDICARE

## 2023-09-03 ENCOUNTER — APPOINTMENT (OUTPATIENT)
Dept: MRI IMAGING | Facility: HOSPITAL | Age: 58
DRG: 098 | End: 2023-09-03
Payer: MEDICARE

## 2023-09-03 LAB
ANION GAP SERPL CALCULATED.3IONS-SCNC: 9.6 MMOL/L (ref 5–15)
BASOPHILS # BLD AUTO: 0.01 10*3/MM3 (ref 0–0.2)
BASOPHILS NFR BLD AUTO: 0.2 % (ref 0–1.5)
BUN SERPL-MCNC: 13 MG/DL (ref 8–23)
BUN/CREAT SERPL: 14.6 (ref 7–25)
CALCIUM SPEC-SCNC: 9.1 MG/DL (ref 8.6–10.5)
CHLORIDE SERPL-SCNC: 102 MMOL/L (ref 98–107)
CO2 SERPL-SCNC: 24.4 MMOL/L (ref 22–29)
CREAT SERPL-MCNC: 0.89 MG/DL (ref 0.57–1)
DEPRECATED RDW RBC AUTO: 40.4 FL (ref 37–54)
EGFRCR SERPLBLD CKD-EPI 2021: 74.3 ML/MIN/1.73
EOSINOPHIL # BLD AUTO: 0.01 10*3/MM3 (ref 0–0.4)
EOSINOPHIL NFR BLD AUTO: 0.2 % (ref 0.3–6.2)
ERYTHROCYTE [DISTWIDTH] IN BLOOD BY AUTOMATED COUNT: 12.5 % (ref 12.3–15.4)
GLUCOSE SERPL-MCNC: 94 MG/DL (ref 65–99)
HCT VFR BLD AUTO: 34.8 % (ref 34–46.6)
HGB BLD-MCNC: 11.9 G/DL (ref 12–15.9)
IMM GRANULOCYTES # BLD AUTO: 0.02 10*3/MM3 (ref 0–0.05)
IMM GRANULOCYTES NFR BLD AUTO: 0.4 % (ref 0–0.5)
LYMPHOCYTES # BLD AUTO: 0.69 10*3/MM3 (ref 0.7–3.1)
LYMPHOCYTES NFR BLD AUTO: 12.3 % (ref 19.6–45.3)
MCH RBC QN AUTO: 30.4 PG (ref 26.6–33)
MCHC RBC AUTO-ENTMCNC: 34.2 G/DL (ref 31.5–35.7)
MCV RBC AUTO: 89 FL (ref 79–97)
MONOCYTES # BLD AUTO: 0.51 10*3/MM3 (ref 0.1–0.9)
MONOCYTES NFR BLD AUTO: 9.1 % (ref 5–12)
NEUTROPHILS NFR BLD AUTO: 4.37 10*3/MM3 (ref 1.7–7)
NEUTROPHILS NFR BLD AUTO: 77.8 % (ref 42.7–76)
NRBC BLD AUTO-RTO: 0 /100 WBC (ref 0–0.2)
PLATELET # BLD AUTO: 189 10*3/MM3 (ref 140–450)
PMV BLD AUTO: 10.9 FL (ref 6–12)
POTASSIUM SERPL-SCNC: 3.8 MMOL/L (ref 3.5–5.2)
POTASSIUM SERPL-SCNC: 4 MMOL/L (ref 3.5–5.2)
RBC # BLD AUTO: 3.91 10*6/MM3 (ref 3.77–5.28)
SODIUM SERPL-SCNC: 136 MMOL/L (ref 136–145)
WBC NRBC COR # BLD: 5.61 10*3/MM3 (ref 3.4–10.8)

## 2023-09-03 PROCEDURE — 25010000002 LORAZEPAM PER 2 MG: Performed by: PSYCHIATRY & NEUROLOGY

## 2023-09-03 PROCEDURE — G0378 HOSPITAL OBSERVATION PER HR: HCPCS

## 2023-09-03 PROCEDURE — 99232 SBSQ HOSP IP/OBS MODERATE 35: CPT

## 2023-09-03 PROCEDURE — 25010000002 ENOXAPARIN PER 10 MG: Performed by: INTERNAL MEDICINE

## 2023-09-03 PROCEDURE — 25010000002 DIHYDROERGOTAMINE MESYLATE PER 1 MG: Performed by: INTERNAL MEDICINE

## 2023-09-03 PROCEDURE — 25010000002 METOCLOPRAMIDE PER 10 MG: Performed by: INTERNAL MEDICINE

## 2023-09-03 PROCEDURE — 25510000001 IOPAMIDOL PER 1 ML: Performed by: INTERNAL MEDICINE

## 2023-09-03 PROCEDURE — 70496 CT ANGIOGRAPHY HEAD: CPT

## 2023-09-03 PROCEDURE — 80048 BASIC METABOLIC PNL TOTAL CA: CPT | Performed by: INTERNAL MEDICINE

## 2023-09-03 PROCEDURE — 97535 SELF CARE MNGMENT TRAINING: CPT

## 2023-09-03 PROCEDURE — 97530 THERAPEUTIC ACTIVITIES: CPT

## 2023-09-03 PROCEDURE — 70551 MRI BRAIN STEM W/O DYE: CPT

## 2023-09-03 PROCEDURE — 97166 OT EVAL MOD COMPLEX 45 MIN: CPT

## 2023-09-03 PROCEDURE — 25010000002 HYDROMORPHONE 1 MG/ML SOLUTION: Performed by: INTERNAL MEDICINE

## 2023-09-03 PROCEDURE — 84132 ASSAY OF SERUM POTASSIUM: CPT | Performed by: INTERNAL MEDICINE

## 2023-09-03 PROCEDURE — 85025 COMPLETE CBC W/AUTO DIFF WBC: CPT | Performed by: INTERNAL MEDICINE

## 2023-09-03 RX ORDER — TOPIRAMATE 50 MG/1
50 TABLET, FILM COATED ORAL NIGHTLY
Status: DISCONTINUED | OUTPATIENT
Start: 2023-09-03 | End: 2023-09-12 | Stop reason: HOSPADM

## 2023-09-03 RX ORDER — ENOXAPARIN SODIUM 100 MG/ML
40 INJECTION SUBCUTANEOUS EVERY 12 HOURS
Status: DISCONTINUED | OUTPATIENT
Start: 2023-09-03 | End: 2023-09-12 | Stop reason: HOSPADM

## 2023-09-03 RX ORDER — LORAZEPAM 2 MG/ML
2 INJECTION INTRAMUSCULAR
Status: COMPLETED | OUTPATIENT
Start: 2023-09-03 | End: 2023-09-03

## 2023-09-03 RX ORDER — LOPERAMIDE HYDROCHLORIDE 2 MG/1
4 CAPSULE ORAL 4 TIMES DAILY PRN
Status: DISCONTINUED | OUTPATIENT
Start: 2023-09-03 | End: 2023-09-12 | Stop reason: HOSPADM

## 2023-09-03 RX ADMIN — Medication 10 ML: at 20:43

## 2023-09-03 RX ADMIN — METOCLOPRAMIDE 10 MG: 5 INJECTION, SOLUTION INTRAMUSCULAR; INTRAVENOUS at 00:35

## 2023-09-03 RX ADMIN — DIHYDROERGOTAMINE MESYLATE 0.5 MG: 1 INJECTION, SOLUTION INTRAMUSCULAR; INTRAVENOUS; SUBCUTANEOUS at 08:26

## 2023-09-03 RX ADMIN — DIHYDROERGOTAMINE MESYLATE 0.5 MG: 1 INJECTION, SOLUTION INTRAMUSCULAR; INTRAVENOUS; SUBCUTANEOUS at 18:11

## 2023-09-03 RX ADMIN — MAGNESIUM OXIDE 400 MG (241.3 MG MAGNESIUM) TABLET 400 MG: TABLET at 18:12

## 2023-09-03 RX ADMIN — ASPIRIN 81 MG: 81 TABLET, COATED ORAL at 08:09

## 2023-09-03 RX ADMIN — ENOXAPARIN SODIUM 40 MG: 100 INJECTION SUBCUTANEOUS at 20:38

## 2023-09-03 RX ADMIN — HYDROCODONE BITARTRATE AND ACETAMINOPHEN 1 TABLET: 7.5; 325 TABLET ORAL at 08:10

## 2023-09-03 RX ADMIN — TOPIRAMATE 50 MG: 50 TABLET, FILM COATED ORAL at 20:38

## 2023-09-03 RX ADMIN — ENOXAPARIN SODIUM 40 MG: 100 INJECTION SUBCUTANEOUS at 08:10

## 2023-09-03 RX ADMIN — Medication 400 MG: at 18:12

## 2023-09-03 RX ADMIN — METOCLOPRAMIDE 10 MG: 5 INJECTION, SOLUTION INTRAMUSCULAR; INTRAVENOUS at 08:10

## 2023-09-03 RX ADMIN — FAMOTIDINE 40 MG: 20 TABLET, FILM COATED ORAL at 08:10

## 2023-09-03 RX ADMIN — Medication 1000 MCG: at 08:09

## 2023-09-03 RX ADMIN — IOPAMIDOL 95 ML: 755 INJECTION, SOLUTION INTRAVENOUS at 13:12

## 2023-09-03 RX ADMIN — SODIUM CHLORIDE 100 ML/HR: 9 INJECTION, SOLUTION INTRAVENOUS at 08:08

## 2023-09-03 RX ADMIN — METOCLOPRAMIDE 10 MG: 5 INJECTION, SOLUTION INTRAMUSCULAR; INTRAVENOUS at 18:12

## 2023-09-03 RX ADMIN — CLONIDINE HYDROCHLORIDE 0.1 MG: 0.1 TABLET ORAL at 21:38

## 2023-09-03 RX ADMIN — HYDROMORPHONE HYDROCHLORIDE 1 MG: 1 INJECTION, SOLUTION INTRAMUSCULAR; INTRAVENOUS; SUBCUTANEOUS at 12:54

## 2023-09-03 RX ADMIN — LOPERAMIDE HYDROCHLORIDE 4 MG: 2 CAPSULE ORAL at 11:53

## 2023-09-03 RX ADMIN — DIHYDROERGOTAMINE MESYLATE 0.5 MG: 1 INJECTION, SOLUTION INTRAMUSCULAR; INTRAVENOUS; SUBCUTANEOUS at 00:45

## 2023-09-03 RX ADMIN — Medication 10 ML: at 08:21

## 2023-09-03 RX ADMIN — ACETAMINOPHEN 650 MG: 325 TABLET, FILM COATED ORAL at 05:52

## 2023-09-03 RX ADMIN — LORAZEPAM 2 MG: 2 INJECTION INTRAMUSCULAR; INTRAVENOUS at 12:49

## 2023-09-03 RX ADMIN — ACETAMINOPHEN 650 MG: 325 TABLET, FILM COATED ORAL at 21:35

## 2023-09-03 RX ADMIN — LAMOTRIGINE 200 MG: 100 TABLET ORAL at 20:38

## 2023-09-03 RX ADMIN — LAMOTRIGINE 200 MG: 100 TABLET ORAL at 08:09

## 2023-09-03 NOTE — PLAN OF CARE
"AAOX4. SR. RA. Dilaudid and ativan given prior to going for MRI and CT.  Patient was sleeping when she arrived back from CT/MRI. Roughly 1600 the CNA notified this nurse that the patient was very confused. She was up trying to walk around, fidgeting with things, illogical, singing. NIH-0. MD Leiva and MD Leavitt notified. Also notified that her visitor prior to going down for the procedure smelled of marijuana, this nursed asked the patient if she had given her a edible or gummy and the patient stated \" I think so\" then stated \"I don't know\". Urine drug screen ordered.  at bedside notified. 1724 MRI called and notified the MRI was not completed while patient was down for a CT because they did not have an available room. MD Leavitt stated It was okay to give DHE.      Problem: Fall Injury Risk  Goal: Absence of Fall and Fall-Related Injury  9/3/2023 1649 by Thao Vann RN  Outcome: Ongoing, Progressing  9/3/2023 1649 by Thao Vann RN  Outcome: Ongoing, Progressing  Intervention: Identify and Manage Contributors  Recent Flowsheet Documentation  Taken 9/3/2023 1600 by Thao Vann RN  Medication Review/Management: medications reviewed  Taken 9/3/2023 1408 by Thao Vann RN  Medication Review/Management: medications reviewed  Taken 9/3/2023 1200 by Thao Vann RN  Medication Review/Management: medications reviewed  Taken 9/3/2023 1000 by Thao Vann RN  Medication Review/Management: medications reviewed  Taken 9/3/2023 0808 by Thao Vann RN  Medication Review/Management: medications reviewed  Intervention: Promote Injury-Free Environment  Recent Flowsheet Documentation  Taken 9/3/2023 1600 by Thao Vann RN  Safety Promotion/Fall Prevention:   safety round/check completed   room organization consistent   nonskid shoes/slippers when out of bed   lighting adjusted   clutter free environment maintained   assistive device/personal items within reach   activity " supervised  Taken 9/3/2023 1408 by Thao Vann RN  Safety Promotion/Fall Prevention:   safety round/check completed   room organization consistent   nonskid shoes/slippers when out of bed   lighting adjusted   clutter free environment maintained   assistive device/personal items within reach   activity supervised  Taken 9/3/2023 1200 by Thao Vann RN  Safety Promotion/Fall Prevention:   room organization consistent   safety round/check completed   nonskid shoes/slippers when out of bed   lighting adjusted   clutter free environment maintained   assistive device/personal items within reach   activity supervised  Taken 9/3/2023 1000 by Thao Vann RN  Safety Promotion/Fall Prevention:   safety round/check completed   room organization consistent   nonskid shoes/slippers when out of bed   lighting adjusted   clutter free environment maintained   assistive device/personal items within reach   activity supervised  Taken 9/3/2023 0808 by Thao Vann RN  Safety Promotion/Fall Prevention:   safety round/check completed   room organization consistent   nonskid shoes/slippers when out of bed   lighting adjusted   clutter free environment maintained   assistive device/personal items within reach   activity supervised     Problem: Pain Chronic (Persistent) (Comorbidity Management)  Goal: Acceptable Pain Control and Functional Ability  9/3/2023 1649 by Thao Vann RN  Outcome: Ongoing, Progressing  9/3/2023 1649 by Thao Vann RN  Outcome: Ongoing, Progressing  Intervention: Manage Persistent Pain  Recent Flowsheet Documentation  Taken 9/3/2023 1600 by Thao Vann RN  Medication Review/Management: medications reviewed  Taken 9/3/2023 1408 by Thao Vann RN  Sleep/Rest Enhancement: awakenings minimized  Medication Review/Management: medications reviewed  Taken 9/3/2023 1200 by Thao Vann RN  Medication Review/Management: medications reviewed  Taken 9/3/2023 1000 by Edwar  PRANAY Osuna  Medication Review/Management: medications reviewed  Taken 9/3/2023 0808 by Thao Vann RN  Medication Review/Management: medications reviewed  Intervention: Develop Pain Management Plan  Recent Flowsheet Documentation  Taken 9/3/2023 0808 by Thao Vann RN  Pain Management Interventions:   quiet environment facilitated   position adjusted   pillow support provided   see MAR  Intervention: Optimize Psychosocial Wellbeing  Recent Flowsheet Documentation  Taken 9/3/2023 0808 by Thao Vann RN  Supportive Measures:   active listening utilized   decision-making supported   verbalization of feelings encouraged  Diversional Activities:   television   smartphone

## 2023-09-03 NOTE — PROGRESS NOTES
"DOS: 9/3/2023  NAME: Annemarie Ha   : 1965  PCP: Provider, No Known  Chief Complaint   Patient presents with    Headache     Worst HA of Life     Neurology    Subjective:     Interval History  Pt seen in follow up today, however the problem is new to the examiner.  Patient Complaints:  No acute events overnight.  She is pretty drowsy on exam, lying in bed wearing CPAP. Patient was unable to complete MRI due to anxiety, claustrophobia.  Setting has been reordered with Ativan to be given prior to imaging.  She received DHE for her headache but says it is no better or worse. No family at bedside.  History taken from: patient chart    Objective:  Vital signs: /81 (BP Location: Right arm, Patient Position: Lying)   Pulse 64   Temp 98.2 °F (36.8 °C) (Oral)   Resp 18   Ht 166.4 cm (65.5\")   Wt 117 kg (258 lb 8 oz)   SpO2 96%   BMI 42.36 kg/m²       Physical Exam:  GENERAL: NAD, alert and cooperative  HEENT: Normocephalic, atraumatic   Resp: Even and unlabored, no audible wheezes, wearing CPAP  Skin: Warm and dry, no rashes or birth marks.   Psychiatric: Normal mood and affect.    Neurological:   MS: AOx3, recent/remote memory intact, normal attention/concentration, language intact, no neglect   CN: visual acuity grossly normal, PERRL, EOMI, no nystagmus, no facial droop, no dysarthria, hearing symmetric, tongue midline, shoulder shrug symmetric  Motor: 5/5 strength in all 4 ext. Normal tone.  No tremor noted.  Sensory: Intact to light touch in all four ext.  Coordination: No dysmetria finger to nose test  Gait and station: Deferred    Results Review: I have reviewed MRI brain and see no evidence of acute infarct   I reviewed the patient's new clinical results.  I reviewed the patient's new imaging results and agree with the interpretation.  I reviewed the patient's other test results and agree with the interpretation  Discussed with Dr. Leavitt     Current Medications:  Scheduled " Medications:aspirin, 81 mg, Oral, Daily  cloNIDine, 0.1 mg, Oral, Nightly  metoclopramide, 10 mg, Intravenous, Q8H   And  dihydroergotamine (DHE) IVPB in 50 ml NS, 0.5 mg, Intravenous, Q8H  enoxaparin, 40 mg, Subcutaneous, Q12H  famotidine, 40 mg, Oral, Daily  HYDROcodone-acetaminophen, 1 tablet, Oral, BID AC  lamoTRIgine, 200 mg, Oral, BID  senna-docusate sodium, 2 tablet, Oral, BID  sodium chloride, 10 mL, Intravenous, Q12H  vitamin B-12, 1,000 mcg, Oral, Daily  vitamin D, 50,000 Units, Oral, Weekly      Infusions:      PRN Medications:    acetaminophen **OR** acetaminophen **OR** acetaminophen    albuterol sulfate HFA    senna-docusate sodium **AND** polyethylene glycol **AND** bisacodyl **AND** bisacodyl    Calcium Replacement - Follow Nurse / BPA Driven Protocol    HYDROmorphone **AND** naloxone    loperamide    Magnesium Standard Dose Replacement - Follow Nurse / BPA Driven Protocol    nitroglycerin    ondansetron **OR** ondansetron    Phosphorus Replacement - Follow Nurse / BPA Driven Protocol    Potassium Replacement - Follow Nurse / BPA Driven Protocol    [COMPLETED] Insert Peripheral IV **AND** sodium chloride    sodium chloride    sodium chloride    Laboratory results:  Lab Results   Component Value Date    GLUCOSE 94 09/03/2023    CALCIUM 9.1 09/03/2023     09/03/2023    K 3.8 09/03/2023    CO2 24.4 09/03/2023     09/03/2023    BUN 13 09/03/2023    CREATININE 0.89 09/03/2023    EGFRIFAFRI 92 02/08/2016    EGFRIFNONA 77 11/29/2017    BCR 14.6 09/03/2023    ANIONGAP 9.6 09/03/2023     Lab Results   Component Value Date    WBC 5.61 09/03/2023    HGB 11.9 (L) 09/03/2023    HCT 34.8 09/03/2023    MCV 89.0 09/03/2023     09/03/2023          No results found for: INR, PROTIME  Lab Results   Component Value Date    TSH 0.853 09/02/2023     No components found for: LDLCALC  Lab Results   Component Value Date    HGBA1C 5.00 09/02/2023     No components found for: B12    Review and interpretation  of imaging:   CT Head Without Contrast    Result Date: 9/2/2023  HEAD CT  HISTORY: Left side headache.  TECHNIQUE: Noncontrast head CT is provided. Comparison: None.  FINDINGS: Screw-plate hardware along the inferior right orbital rim from old fracture repair. Paranasal sinuses are clear. 0The ventricles are normal in caliber. The brain parenchyma and extra-axial spaces appear normal. There is no hemorrhage or acute ischemic change. The bones of the skull appear normal. The mastoid air cells, middle ears, and visualized paranasal sinuses are clear.      Negative head CT.  Radiation dose reduction techniques were utilized, including automated exposure control and exposure modulation based on body size.   This report was finalized on 9/2/2023 11:08 AM by Dr. Loco Barlow M.D.      MRI Brain Without Contrast    Result Date: 9/3/2023  BRAIN MRI WITHOUT CONTRAST  HISTORY: Severe headache left-sided; R51.9-Headache, unspecified; G35-Multiple sclerosis  COMPARISON: September 2, 2023.  FINDINGS:  Multiplanar images of the head were obtained without gadolinium. No areas of restricted diffusion are seen to suggest acute infarct. Ventricles are normal in size. There is no midline shift or mass effect. There is some mild periventricular microangiopathic change. No abnormality is seen on gradient echo imaging. There is artifact overlying the right orbit, related to prior plate and screw fixation of the anterior wall of the right maxillary sinus. No T2 weighted imaging or postcontrast imaging was obtained, as the patient could not further tolerate the examination.      1. No acute intracranial abnormality. Please note, T2 and postcontrast imaging was not obtained, as the patient could not tolerate any further imaging.   This report was finalized on 9/3/2023 5:13 AM by Dr. Kelsie Chowdhury M.D.      XR Chest 1 Vw    Result Date: 8/29/2023  REVIEWING YOUR TEST RESULTS IN [facility] IS NOT A SUBSTITUTE FOR DISCUSSING THOSE  RESULTS WITH YOUR HEALTH CARE PROVIDER. PLEASE CONTACT YOUR PROVIDER VIA [employer] TO DISCUSS ANY QUESTIONS OR CONCERNS YOU MAY HAVE REGARDING THESE TEST RESULTS.  RADIOLOGY REPORT FACILITY:  [facility] UNIT/AGE/GENDER: M.ED  ER      AGE:58 Y          SEX:F PATIENT NAME/:  GABRIEL LUGO    1965 UNIT NUMBER:  [id number] ACCOUNT NUMBER:  [id number] ACCESSION NUMBER:  [id number] EXAMINATION: XR CHEST 1 VW DATE: 2023 HISTORY: Fever. COMPARISON: None. FINDINGS: A single view of the chest demonstrates clear lungs. There is no pneumothorax or pleural effusion. The heart size and mediastinal contour are normal. IMPRESSION: No acute cardiopulmonary radiographic abnormality. Dictated by: Loco Hathaway M.D. Images and Report reviewed and interpreted by: Loco Hathaway M.D. <PS><Electronically signed by: Loco Hathaway M.D.> 2023 D: 2023 T: 2023     Work-up to date:   CTH wo: Negative head CT  MRI brain wo 9/3: No acute intracranial abnormality.  T2 and postcontrast imaging not obtained as patient cannot tolerate further imaging.  CTA head: Complete, results pending    Lab Review: A1c 5%, TSH 0.853, CRP 0.95, ESR 15    Diagnoses:  New onset headache  Left orbital pain  History of Multiple sclerosis, relapsing remitting type    Impression: Ms. Ha is a 58-year-old female with a past medical history of multiple sclerosis relapsing remitting type, epilepsy, prior optic neuritis, TMJ arthritis.  She originally presented to [facility] 2023 for headache and eye pain.  She is currently established with Dr. Tien Boateng for multiple sclerosis.  She denies any history of migraine but reports an onset over the last few months of intermittent left orbital and temporal headaches with associated blurred vision.  On exam today she denies any photo/phonophobia, nausea or vomiting, visual or speech disturbances, and has no unilateral or  focal deficits. CT head was negative, MRI brain showed no acute intracranial abnormality however patient cannot tolerate further imaging so unable to determine if orbital inflammation is present.  MRI has been reordered with one-time dose of Ativan to be given prior to study. CTA has been completed, results are pending. Patient received migraine cocktail of Benadryl, Compazine, and magnesium and says she still feels her headache is there. Will start PO magnesium and riboflavin daily. She is receiving IV Dilaudid PRN and scheduled Norco for pain, likely contributing to a rebound headache. We will continue to follow with you. Call RRT for any acute neurological change or concern.     Plan:  Follow-up MRI brain and orbits with and without contrast  Follow-up CTA head  Start magnesium 400mg and riboflavin 400mg daily   Therapies as written  CCP for discharge planning     I have discussed the above with the patient and Dr. Leavitt who are in agreement with the plan.     Danelle Stewart, APRN  09/03/23  13:31 EDT
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
## 2023-09-03 NOTE — PROGRESS NOTES
Name: Annemarie Ha ADMIT: 2023   : 1965  PCP: Provider, No Known    MRN: [id number] LOS: 0 days   AGE/SEX: 58 y.o. female  ROOM: [facility]/     Subjective   Subjective   Patient just came back from MRI and a CAT scan.  She is somnolent but arousable.  Reports that the left-sided headache is better.  No loss of consciousness.  No seizures.  No focal neurological symptoms.  No fever or chills.    Review of Systems  Cardiovascular/respiratory.  No chest pain/no shortness of breath/no palpitations/no cough  .  No dysuria or hematuria.  GI.  No abdominal pain or nausea or vomiting.     Objective   Objective   Vital Signs  Temp:  [98.2 °F (36.8 °C)-99.7 °F (37.6 °C)] 98.2 °F (36.8 °C)  Heart Rate:  [64-83] 64  Resp:  [18] 18  BP: (128-147)/(67-81) 147/81  SpO2:  [96 %-100 %] 96 %  on   ;   Device (Oxygen Therapy): room air  No intake or output data in the 24 hours ending 23 1416  Body mass index is 42.36 kg/m².      23  0943 23  1630   Weight: 120 kg (264 lb 8.8 oz) 117 kg (258 lb 8 oz)     Physical Exam  General.  Middle-aged female.  Morbidly obese.  Somnolent but arousable (received benzos  prior to MRI)  Slow mentation.  No apparent pain/distress/diaphoresis.  Affect appears to be rather flat.  Mood is normal.  HEENT.  No external head injury.  Pupils equal round and reactive.  Intact extraocular musculature.  No pallor or jaundice.  No temporal artery tenderness.  Moist mucous membrane with no lesions.  Neck.  Supple.  No JVD.  No lymphadenopathy or thyromegaly.  Cardiovascular.  Regular rate and rhythm with no gallops or murmurs.    Chest.  Clear to auscultation bilaterally with no added sounds.  Abdomen.  Obese.  Soft lax.  No tenderness.  No organomegaly.  No guarding or rebound.    Extremities.  No clubbing/cyanosis/edema.  CNS.  No acute focal neurological deficits     Results Review:      Results from last 7 days   Lab Units 23  0515 23  0023 23  1000    SODIUM mmol/L 136  --  135*   POTASSIUM mmol/L 3.8 4.0 3.3*   CHLORIDE mmol/L 102  --  99   CO2 mmol/L 24.4  --  23.5   BUN mg/dL 13  --  13   CREATININE mg/dL 0.89  --  0.84   GLUCOSE mg/dL 94  --  116*   CALCIUM mg/dL 9.1  --  9.2   AST (SGOT) U/L  --   --  9   ALT (SGPT) U/L  --   --  12     Estimated Creatinine Clearance: 86.7 mL/min (by C-G formula based on SCr of 0.89 mg/dL).  Results from last 7 days   Lab Units 09/02/23  1645   HEMOGLOBIN A1C % 5.00     Results from last 7 days   Lab Units 09/02/23  0951   GLUCOSE mg/dL 127             Results from last 7 days   Lab Units 09/02/23  1645   TSH uIU/mL 0.853     Results from last 7 days   Lab Units 09/02/23  1645   MAGNESIUM mg/dL 2.2           Invalid input(s): LDLCALC  Results from last 7 days   Lab Units 09/03/23  0515 09/02/23  1000 09/02/23  1000 08/29/23  0529   WBC 10*3/mm3 5.61  --  7.92 6.16   HEMOGLOBIN g/dL 11.9*  --  12.3 13.3   HEMATOCRIT % 34.8  --  35.0 38.0   PLATELETS 10*3/mm3 189  --  186 286   MCV fL 89.0   < > 89.3 88.0   MCH pg 30.4   < > 31.4 30.8   MCHC g/dL 34.2   < > 35.1 35.0   RDW % 12.5   < > 12.8 12.2   RDW-SD fl 40.4   < > 41.7  --    MPV fL 10.9   < > 10.7 10.3   NEUTROPHIL % % 77.8*   < > 85.0* 77.0   LYMPHOCYTE % % 12.3*   < > 8.3* 15.6   MONOCYTES % % 9.1   < > 6.3 6.5   EOSINOPHIL % % 0.2*   < > 0.0* 0.3   BASOPHIL % % 0.2   < > 0.1 0.3   IMM GRAN % % 0.4   < > 0.3 0.3   NEUTROS ABS 10*3/mm3 4.37   < > 6.73 4.74   LYMPHS ABS 10*3/mm3 0.69*   < > 0.66* 0.96   MONOS ABS 10*3/mm3 0.51   < > 0.50 0.40   EOS ABS 10*3/mm3 0.01   < > 0.00 0.02   BASOS ABS 10*3/mm3 0.01   < > 0.01 0.02   IMMATURE GRANS (ABS) 10*3/mm3 0.02   < > 0.02 0.02   NRBC /100 WBC 0.0   < > 0.0 0    < > = values in this interval not displayed.             Results from last 7 days   Lab Units 09/02/23  1000   PROCALCITONIN ng/mL 0.04   LACTATE mmol/L 1.0     Results from last 7 days   Lab Units 09/02/23  1000   SED RATE mm/hr 15   CRP mg/dL 0.95*          Results from last 7 days   Lab Units 09/02/23  1042 09/02/23  1012   BLOODCX  No growth at 24 hours No growth at 24 hours     Results from last 7 days   Lab Units 09/02/23  1002   ADENOVIRUS DETECTION BY PCR  Not Detected   CORONAVIRUS 229E  Not Detected   CORONAVIRUS HKU1  Not Detected   CORONAVIRUS NL63  Not Detected   CORONAVIRUS OC43  Not Detected   HUMAN METAPNEUMOVIRUS  Not Detected   HUMAN RHINOVIRUS/ENTEROVIRUS  Not Detected   INFLUENZA B PCR  Not Detected   PARAINFLUENZA 1  Not Detected   PARAINFLUENZA VIRUS 2  Not Detected   PARAINFLUENZA VIRUS 3  Not Detected   PARAINFLUENZA VIRUS 4  Not Detected   BORDETELLA PERTUSSIS PCR  Not Detected   CHLAMYDOPHILA PNEUMONIAE PCR  Not Detected   MYCOPLAMA PNEUMO PCR  Not Detected   INFLUENZA A PCR  Not Detected   RSV, PCR  Not Detected               Imaging:  Imaging Results (Last 24 Hours)       Procedure Component Value Units Date/Time    CT Angiogram Head [[id number]] Collected: 09/03/23 1404     Updated: 09/03/23 1404    Narrative:      CONTRAST-ENHANCED CT ANGIOGRAM OF THE HEAD 09/03/2023     CLINICAL HISTORY: Severe headache.      TECHNIQUE: Spiral CT images were obtained from the base of the skull to  the vertex both pre-intravenous and postintravenous contrast and images  were reformatted and submitted in 3 mm thick axial, sagittal and coronal  CT sections with brain algorithm. Additional spiral CT angiographic  images were obtained from the C1-2 cervical level up through the vertex  of the skull during the arterial phase of contrast and the images were  reformatted and submitted in 1 mm thick axial, sagittal and coronal CT  sections with soft tissue algorithm and 3D reconstructions were  performed to complete the CT angiogram of the head.      This is correlated to a CT of the head without contrast yesterday  09/02/2023 at 10:30 a.m. and an MRI of the brain without contrast  earlier this morning 09/03/2023 at 1:30 a.m.     FINDINGS:     HEAD CT: The brain  parenchyma is normal in attenuation. The ventricles  are normal in size. I see no focal mass effect. There is no midline  shift. No extra-axial fluid collections are identified. There is no  evidence of acute intra-cranial hemorrhage. Following contrast no  abnormal areas of enhancement are seen in the head. The calvarium and  the skull base are normal in appearance. There is an anterior plate over  the anterior wall of the right maxillary sinus. The paranasal sinuses,  the mastoid air cells and middle ear cavities are clear.     CT ANGIOGRAM OF THE HEAD:  The visualized portions of the distal  vertebral arteries are normal in appearance from the C1-2 cervical level  to the vertebrobasilar junction. 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 This finding is best  demonstrated on coronal thin cut CTA images 119-123. The basilar artery  and the basilar tip is normal in appearance. The posterior cerebral and  superior cerebellar arteries are within normal limits. The upper  cervical, petrous, cavernous and supracavernous segments of the internal  carotid arteries are within normal limits. The left posterior  communicating artery origin is within normal limits.  The A1 segments of  the anterior cerebral arteries and the anterior communicating artery  origin and A2 segments of the anterior cerebral arteries are within  normal limits. The M1 segments of the middle cerebral arteries and the  middle cerebral artery bifurcations are within normal limits.       Impression:      1. There is an anterior plate projecting over the junction of the  anterior wall of the right maxillary sinus and anterior right orbital  floor from previous surgery and correlation with surgical history is  suggested.     2. 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     Radiation dose reduction techniques were utilized, including automated  exposure control and exposure modulation based on body size.          MRI Brain Without Contrast [[id number]] Collected: 09/03/23 0508     Updated: 09/03/23 0516    Narrative:      BRAIN MRI WITHOUT CONTRAST     HISTORY: Severe headache left-sided; R51.9-Headache, unspecified;  G35-Multiple sclerosis     COMPARISON: September 2, 2023.     FINDINGS:  Multiplanar images of the head were obtained without  gadolinium. No areas of restricted diffusion are seen to suggest acute  infarct. Ventricles are normal in size. There is no midline shift or  mass effect. There is some mild periventricular microangiopathic change.  No abnormality is seen on gradient echo imaging. There is artifact  overlying the right orbit, related to prior plate and screw fixation of  the anterior wall of the right maxillary sinus. No T2 weighted imaging  or postcontrast imaging was obtained, as the patient could not further  tolerate the examination.       Impression:      1. No acute intracranial abnormality. Please note, T2 and postcontrast  imaging was not obtained, as the patient could not tolerate any further  imaging.        This report was finalized on 9/3/2023 5:13 AM by Dr. Kelsie Chowdhury M.D.                  I reviewed the patient's new clinical results / labs / tests / procedures      Assessment/Plan     Active Hospital Problems    Diagnosis  POA    **Left-sided headache [R51.9]  Yes      Resolved Hospital Problems   No resolved problems to display.         Left-sided headache.  Mostly migraine.  Improved on migraine cocktail.  CNS examination is negative.  CT scan of the brain without contrast is negative.  Negative CTA of the brain except for surgical changes of the right orbit floor.  Negative MRI of the brain.  Positive  family history of migraine.  No clinical evidence of temporal arteritis with normal ESR and only mildly elevated CRP.  Differential diagnoses include migraine/MS flareup.  Will initiate Topamax and continue Reglan and DHE.  Continue the patient on her Lamictal.  Patient has received ocrelizumab last in June.  Neurology saw the patient and I am awaiting their feedback about possible discharge tomorrow.    Hypokalemia.  Normal magnesium.  Resolved with substitution.  Hyperglycemia.  History of glucose intolerance.  A1c is normal at 5.  DDD/generalized osteoarthritis/chronic pain syndrome/opioid dependence.  Continue the patient on her hydrocodone.  History of mood disorder.  Continue Prozac and Lamictal.  Obstructive sleep apnea.  Continue CPAP.  Dyslipidemia.  Continue Lipitor.  VTE prophylaxis.  Lovenox.     Discussed my findings and plan of treatment with the patient.  Disposition.  Hopefully home tomorrow if okay with neurology.      Nicole Leiva MD  [facility]  09/03/23  14:16 EDT
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
## 2023-09-04 PROBLEM — F11.20 OPIOID DEPENDENCE: Status: ACTIVE | Noted: 2023-09-04

## 2023-09-04 PROBLEM — G89.4 CHRONIC PAIN SYNDROME: Status: ACTIVE | Noted: 2023-09-04

## 2023-09-04 PROBLEM — F39 MOOD DISORDER: Status: ACTIVE | Noted: 2023-09-04

## 2023-09-04 PROBLEM — G43.909 MIGRAINE: Status: ACTIVE | Noted: 2023-09-04

## 2023-09-04 PROBLEM — E74.39 GLUCOSE INTOLERANCE: Status: ACTIVE | Noted: 2023-09-04

## 2023-09-04 PROBLEM — F12.10 TETRAHYDROCANNABINOL (THC) USE DISORDER, MILD, ABUSE: Status: ACTIVE | Noted: 2023-09-04

## 2023-09-04 PROBLEM — E87.6 HYPOKALEMIA: Status: ACTIVE | Noted: 2023-09-04

## 2023-09-04 LAB
AMPHET+METHAMPHET UR QL: NEGATIVE
ANION GAP SERPL CALCULATED.3IONS-SCNC: 12 MMOL/L (ref 5–15)
BARBITURATES UR QL SCN: NEGATIVE
BASOPHILS # BLD AUTO: 0.01 10*3/MM3 (ref 0–0.2)
BASOPHILS NFR BLD AUTO: 0.2 % (ref 0–1.5)
BENZODIAZ UR QL SCN: NEGATIVE
BUN SERPL-MCNC: 7 MG/DL (ref 8–23)
BUN/CREAT SERPL: 7.9 (ref 7–25)
CALCIUM SPEC-SCNC: 9.2 MG/DL (ref 8.6–10.5)
CANNABINOIDS SERPL QL: POSITIVE
CHLORIDE SERPL-SCNC: 98 MMOL/L (ref 98–107)
CO2 SERPL-SCNC: 24 MMOL/L (ref 22–29)
COCAINE UR QL: NEGATIVE
CREAT SERPL-MCNC: 0.89 MG/DL (ref 0.57–1)
DEPRECATED RDW RBC AUTO: 40.8 FL (ref 37–54)
EGFRCR SERPLBLD CKD-EPI 2021: 74.3 ML/MIN/1.73
EOSINOPHIL # BLD AUTO: 0.01 10*3/MM3 (ref 0–0.4)
EOSINOPHIL NFR BLD AUTO: 0.2 % (ref 0.3–6.2)
ERYTHROCYTE [DISTWIDTH] IN BLOOD BY AUTOMATED COUNT: 12.7 % (ref 12.3–15.4)
FENTANYL UR-MCNC: NEGATIVE NG/ML
GLUCOSE SERPL-MCNC: 99 MG/DL (ref 65–99)
HCT VFR BLD AUTO: 33.4 % (ref 34–46.6)
HGB BLD-MCNC: 11.6 G/DL (ref 12–15.9)
IMM GRANULOCYTES # BLD AUTO: 0.02 10*3/MM3 (ref 0–0.05)
IMM GRANULOCYTES NFR BLD AUTO: 0.4 % (ref 0–0.5)
LYMPHOCYTES # BLD AUTO: 0.69 10*3/MM3 (ref 0.7–3.1)
LYMPHOCYTES NFR BLD AUTO: 12.5 % (ref 19.6–45.3)
MCH RBC QN AUTO: 31.1 PG (ref 26.6–33)
MCHC RBC AUTO-ENTMCNC: 34.7 G/DL (ref 31.5–35.7)
MCV RBC AUTO: 89.5 FL (ref 79–97)
METHADONE UR QL SCN: NEGATIVE
MONOCYTES # BLD AUTO: 0.51 10*3/MM3 (ref 0.1–0.9)
MONOCYTES NFR BLD AUTO: 9.2 % (ref 5–12)
NEUTROPHILS NFR BLD AUTO: 4.28 10*3/MM3 (ref 1.7–7)
NEUTROPHILS NFR BLD AUTO: 77.5 % (ref 42.7–76)
NRBC BLD AUTO-RTO: 0 /100 WBC (ref 0–0.2)
OPIATES UR QL: NEGATIVE
OXYCODONE UR QL SCN: NEGATIVE
PLATELET # BLD AUTO: 209 10*3/MM3 (ref 140–450)
PMV BLD AUTO: 10.8 FL (ref 6–12)
POTASSIUM SERPL-SCNC: 3.4 MMOL/L (ref 3.5–5.2)
POTASSIUM SERPL-SCNC: 4.1 MMOL/L (ref 3.5–5.2)
RBC # BLD AUTO: 3.73 10*6/MM3 (ref 3.77–5.28)
SODIUM SERPL-SCNC: 134 MMOL/L (ref 136–145)
WBC NRBC COR # BLD: 5.52 10*3/MM3 (ref 3.4–10.8)

## 2023-09-04 PROCEDURE — 99233 SBSQ HOSP IP/OBS HIGH 50: CPT | Performed by: NURSE PRACTITIONER

## 2023-09-04 PROCEDURE — 84132 ASSAY OF SERUM POTASSIUM: CPT | Performed by: INTERNAL MEDICINE

## 2023-09-04 PROCEDURE — 80307 DRUG TEST PRSMV CHEM ANLYZR: CPT | Performed by: INTERNAL MEDICINE

## 2023-09-04 PROCEDURE — G0378 HOSPITAL OBSERVATION PER HR: HCPCS

## 2023-09-04 PROCEDURE — 85025 COMPLETE CBC W/AUTO DIFF WBC: CPT | Performed by: INTERNAL MEDICINE

## 2023-09-04 PROCEDURE — 80048 BASIC METABOLIC PNL TOTAL CA: CPT | Performed by: INTERNAL MEDICINE

## 2023-09-04 PROCEDURE — 97162 PT EVAL MOD COMPLEX 30 MIN: CPT

## 2023-09-04 PROCEDURE — 25010000002 ENOXAPARIN PER 10 MG: Performed by: INTERNAL MEDICINE

## 2023-09-04 PROCEDURE — 97530 THERAPEUTIC ACTIVITIES: CPT

## 2023-09-04 PROCEDURE — 25010000002 METOCLOPRAMIDE PER 10 MG: Performed by: INTERNAL MEDICINE

## 2023-09-04 PROCEDURE — 25010000002 DIHYDROERGOTAMINE MESYLATE PER 1 MG: Performed by: INTERNAL MEDICINE

## 2023-09-04 RX ORDER — POTASSIUM CHLORIDE 750 MG/1
10 TABLET, FILM COATED, EXTENDED RELEASE ORAL DAILY
Status: DISCONTINUED | OUTPATIENT
Start: 2023-09-04 | End: 2023-09-12 | Stop reason: HOSPADM

## 2023-09-04 RX ORDER — POTASSIUM CHLORIDE 750 MG/1
40 TABLET, FILM COATED, EXTENDED RELEASE ORAL EVERY 4 HOURS
Status: COMPLETED | OUTPATIENT
Start: 2023-09-04 | End: 2023-09-04

## 2023-09-04 RX ADMIN — Medication 10 ML: at 09:48

## 2023-09-04 RX ADMIN — ENOXAPARIN SODIUM 40 MG: 100 INJECTION SUBCUTANEOUS at 20:13

## 2023-09-04 RX ADMIN — CLONIDINE HYDROCHLORIDE 0.1 MG: 0.1 TABLET ORAL at 20:14

## 2023-09-04 RX ADMIN — SENNOSIDES AND DOCUSATE SODIUM 2 TABLET: 50; 8.6 TABLET ORAL at 20:14

## 2023-09-04 RX ADMIN — Medication 400 MG: at 09:46

## 2023-09-04 RX ADMIN — ASPIRIN 81 MG: 81 TABLET, COATED ORAL at 09:46

## 2023-09-04 RX ADMIN — TOPIRAMATE 50 MG: 50 TABLET, FILM COATED ORAL at 20:14

## 2023-09-04 RX ADMIN — METOCLOPRAMIDE 10 MG: 5 INJECTION, SOLUTION INTRAMUSCULAR; INTRAVENOUS at 00:29

## 2023-09-04 RX ADMIN — ENOXAPARIN SODIUM 40 MG: 100 INJECTION SUBCUTANEOUS at 09:47

## 2023-09-04 RX ADMIN — DIHYDROERGOTAMINE MESYLATE 0.5 MG: 1 INJECTION, SOLUTION INTRAMUSCULAR; INTRAVENOUS; SUBCUTANEOUS at 00:39

## 2023-09-04 RX ADMIN — LAMOTRIGINE 200 MG: 100 TABLET ORAL at 09:46

## 2023-09-04 RX ADMIN — Medication 10 ML: at 20:14

## 2023-09-04 RX ADMIN — POTASSIUM CHLORIDE 10 MEQ: 750 TABLET, EXTENDED RELEASE ORAL at 09:47

## 2023-09-04 RX ADMIN — DIHYDROERGOTAMINE MESYLATE 0.5 MG: 1 INJECTION, SOLUTION INTRAMUSCULAR; INTRAVENOUS; SUBCUTANEOUS at 09:52

## 2023-09-04 RX ADMIN — Medication 1000 MCG: at 09:46

## 2023-09-04 RX ADMIN — METOCLOPRAMIDE 10 MG: 5 INJECTION, SOLUTION INTRAMUSCULAR; INTRAVENOUS at 09:41

## 2023-09-04 RX ADMIN — MAGNESIUM OXIDE 400 MG (241.3 MG MAGNESIUM) TABLET 400 MG: TABLET at 09:46

## 2023-09-04 RX ADMIN — POTASSIUM CHLORIDE 40 MEQ: 750 TABLET, EXTENDED RELEASE ORAL at 17:22

## 2023-09-04 RX ADMIN — POTASSIUM CHLORIDE 40 MEQ: 750 TABLET, EXTENDED RELEASE ORAL at 13:28

## 2023-09-04 RX ADMIN — FAMOTIDINE 40 MG: 20 TABLET, FILM COATED ORAL at 09:47

## 2023-09-04 RX ADMIN — LAMOTRIGINE 200 MG: 100 TABLET ORAL at 20:14
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
## 2023-09-04 NOTE — PROGRESS NOTES
Name: Annemarie Ha ADMIT: 2023   : 1965  PCP: Provider, No Known    MRN: [id number] LOS: 0 days   AGE/SEX: 58 y.o. female  ROOM: [facility]     Subjective   Subjective   Periods of confusion last night with fidgetiness and occasional disorientation.  Urine tox screen was positive for THC.  Patient reports resolution of the headache.  Positive blurred vision but no double vision or loss of the vision.  No slurring of the speech.  Questionable left-sided weakness.  No seizures.  No dizziness.  Has been report that she had a traumatic incident in the past leading to some PTSD.  Positive weakness.    Review of Systems  Cardiovascular/respiratory.  No chest pain.  No palpitation.  No shortness of breath.  GI.  No abdominal pain or nausea or vomiting.  .  No dysuria or hematuria or urinary incontinence     Objective   Objective   Vital Signs  Temp:  [97.7 °F (36.5 °C)-99.5 °F (37.5 °C)] 98.6 °F (37 °C)  Heart Rate:  [74-93] 76  Resp:  [18] 18  BP: (129-157)/(62-96) 157/85  SpO2:  [90 %-97 %] 97 %  on   ;   Device (Oxygen Therapy): room air  No intake or output data in the 24 hours ending 23 0843  Body mass index is 42.36 kg/m².      23  0943 23  1630   Weight: 120 kg (264 lb 8.8 oz) 117 kg (258 lb 8 oz)     Physical Exam  General.  Middle-aged female.  Morbidly obese.  Alert and oriented x3.  Slow mentation and speech.  No apparent pain/distress/diaphoresis.  Affect appears flat.  Mood is anxious  HEENT.  No external head injury.  Pupils equal round and reactive.  Intact extraocular musculature.  No pallor or jaundice.  No temporal artery tenderness.  Moist mucous membrane with no lesions.  Neck.  Supple.  No JVD.  No lymphadenopathy or thyromegaly.  Cardiovascular.  Regular rate and rhythm with no gallops or murmurs.    Chest.  Clear to auscultation bilaterally with no added sounds.  Abdomen.  Obese.  Soft lax.  No tenderness.  No organomegaly.  No guarding or rebound.     Extremities.  No clubbing/cyanosis/edema.  CNS.  No acute focal neurological deficits     Results Review:      Results from last 7 days   Lab Units 09/04/23  0546 09/03/23  0515 09/03/23  0023 09/02/23  1000   SODIUM mmol/L 134* 136  --  135*   POTASSIUM mmol/L 3.4* 3.8 4.0 3.3*   CHLORIDE mmol/L 98 102  --  99   CO2 mmol/L 24.0 24.4  --  23.5   BUN mg/dL 7* 13  --  13   CREATININE mg/dL 0.89 0.89  --  0.84   GLUCOSE mg/dL 99 94  --  116*   CALCIUM mg/dL 9.2 9.1  --  9.2   AST (SGOT) U/L  --   --   --  9   ALT (SGPT) U/L  --   --   --  12     Estimated Creatinine Clearance: 86.7 mL/min (by C-G formula based on SCr of 0.89 mg/dL).  Results from last 7 days   Lab Units 09/02/23  1645   HEMOGLOBIN A1C % 5.00     Results from last 7 days   Lab Units 09/02/23  0951   GLUCOSE mg/dL 127             Results from last 7 days   Lab Units 09/02/23  1645   TSH uIU/mL 0.853     Results from last 7 days   Lab Units 09/02/23  1645   MAGNESIUM mg/dL 2.2           Invalid input(s): LDLCALC  Results from last 7 days   Lab Units 09/04/23  0546 09/03/23  0515 09/02/23  1000 09/02/23  1000 08/29/23  0529   WBC 10*3/mm3 5.52 5.61  --  7.92 6.16   HEMOGLOBIN g/dL 11.6* 11.9*  --  12.3 13.3   HEMATOCRIT % 33.4* 34.8  --  35.0 38.0   PLATELETS 10*3/mm3 209 189  --  186 286   MCV fL 89.5 89.0   < > 89.3 88.0   MCH pg 31.1 30.4   < > 31.4 30.8   MCHC g/dL 34.7 34.2   < > 35.1 35.0   RDW % 12.7 12.5   < > 12.8 12.2   RDW-SD fl 40.8 40.4   < > 41.7  --    MPV fL 10.8 10.9   < > 10.7 10.3   NEUTROPHIL % % 77.5* 77.8*   < > 85.0* 77.0   LYMPHOCYTE % % 12.5* 12.3*   < > 8.3* 15.6   MONOCYTES % % 9.2 9.1   < > 6.3 6.5   EOSINOPHIL % % 0.2* 0.2*   < > 0.0* 0.3   BASOPHIL % % 0.2 0.2   < > 0.1 0.3   IMM GRAN % % 0.4 0.4   < > 0.3 0.3   NEUTROS ABS 10*3/mm3 4.28 4.37   < > 6.73 4.74   LYMPHS ABS 10*3/mm3 0.69* 0.69*   < > 0.66* 0.96   MONOS ABS 10*3/mm3 0.51 0.51   < > 0.50 0.40   EOS ABS 10*3/mm3 0.01 0.01   < > 0.00 0.02   BASOS ABS 10*3/mm3 0.01  0.01   < > 0.01 0.02   IMMATURE GRANS (ABS) 10*3/mm3 0.02 0.02   < > 0.02 0.02   NRBC /100 WBC 0.0 0.0   < > 0.0 0    < > = values in this interval not displayed.             Results from last 7 days   Lab Units 09/02/23  1000   PROCALCITONIN ng/mL 0.04   LACTATE mmol/L 1.0     Results from last 7 days   Lab Units 09/02/23  1000   SED RATE mm/hr 15   CRP mg/dL 0.95*         Results from last 7 days   Lab Units 09/02/23  1042 09/02/23  1012   BLOODCX  No growth at 24 hours No growth at 24 hours     Results from last 7 days   Lab Units 09/02/23  1002   ADENOVIRUS DETECTION BY PCR  Not Detected   CORONAVIRUS 229E  Not Detected   CORONAVIRUS HKU1  Not Detected   CORONAVIRUS NL63  Not Detected   CORONAVIRUS OC43  Not Detected   HUMAN METAPNEUMOVIRUS  Not Detected   HUMAN RHINOVIRUS/ENTEROVIRUS  Not Detected   INFLUENZA B PCR  Not Detected   PARAINFLUENZA 1  Not Detected   PARAINFLUENZA VIRUS 2  Not Detected   PARAINFLUENZA VIRUS 3  Not Detected   PARAINFLUENZA VIRUS 4  Not Detected   BORDETELLA PERTUSSIS PCR  Not Detected   CHLAMYDOPHILA PNEUMONIAE PCR  Not Detected   MYCOPLAMA PNEUMO PCR  Not Detected   INFLUENZA A PCR  Not Detected   RSV, PCR  Not Detected               Imaging:  Imaging Results (Last 24 Hours)       Procedure Component Value Units Date/Time    CT Angiogram Head [[id number]] Collected: 09/03/23 1404     Updated: 09/03/23 2216    Narrative:      CONTRAST-ENHANCED CT ANGIOGRAM OF THE HEAD 09/03/2023     CLINICAL HISTORY: Severe headache.      TECHNIQUE: Spiral CT images were obtained from the base of the skull to  the vertex both pre-intravenous and postintravenous contrast and images  were reformatted and submitted in 3 mm thick axial, sagittal and coronal  CT sections with brain algorithm. Additional spiral CT angiographic  images were obtained from the C1-2 cervical level up through the vertex  of the skull during the arterial phase of contrast and the images were  reformatted and submitted in 1 mm  thick axial, sagittal and coronal CT  sections with soft tissue algorithm and 3D reconstructions were  performed to complete the CT angiogram of the head.      This is correlated to a CT of the head without contrast yesterday  09/02/2023 at 10:30 a.m. and an MRI of the brain without contrast  earlier this morning 09/03/2023 at 1:30 a.m.     FINDINGS:     HEAD CT: The brain parenchyma is normal in attenuation. The ventricles  are normal in size. I see no focal mass effect. There is no midline  shift. No extra-axial fluid collections are identified. There is no  evidence of acute intra-cranial hemorrhage. Following contrast no  abnormal areas of enhancement are seen in the head. The calvarium and  the skull base are normal in appearance. There is an anterior plate over  the anterior wall of the right maxillary sinus. The paranasal sinuses,  the mastoid air cells and middle ear cavities are clear.     CT ANGIOGRAM OF THE HEAD:  The visualized portions of the distal  vertebral arteries are normal in appearance from the C1-2 cervical level  to the vertebrobasilar junction. 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 This finding is best  demonstrated on coronal thin cut CTA images 119-123. The basilar artery  and the basilar tip is normal in appearance. The posterior cerebral and  superior cerebellar arteries are within normal limits. The upper  cervical, petrous, cavernous and supracavernous segments of the internal  carotid arteries are within normal limits. The left posterior  communicating artery origin is within normal limits.  The A1 segments of  the anterior cerebral arteries and the anterior communicating artery  origin and A2 segments of the anterior cerebral arteries are within  normal limits. The M1 segments of the middle cerebral arteries and the  middle cerebral artery bifurcations are within normal  limits.       Impression:      1. There is an anterior plate projecting over the junction of the  anterior wall of the right maxillary sinus and anterior right orbital  floor from previous surgery and correlation with surgical history is  suggested.     2. There is focal fenestration of the intracranial segment of the left  vertebral artery just proximal to the left PICA origin which is a normal  variation, and overall this CT angiogram of the head is within normal  limits with no intracranial aneurysm seen.     Radiation dose reduction techniques were utilized, including automated  exposure control and exposure modulation based on body size.        This report was finalized on 9/3/2023 10:13 PM by Dr. Joselo Urbano M.D.                  I reviewed the patient's new clinical results / labs / tests / procedures      Assessment/Plan     Active Hospital Problems    Diagnosis  POA    **Migraine [G43.909]  Yes    Tetrahydrocannabinol (THC) use disorder, mild, abuse [F12.10]  Yes    Hypokalemia [E87.6]  Yes    Glucose intolerance [E74.39]  Yes    Chronic pain syndrome [G89.4]  Yes    Opioid dependence [F11.20]  Yes    Mood disorder [F39]  Yes    Left-sided headache [R51.9]  Yes    Obstructive sleep apnea [G47.33]  Yes    Dyslipidemia [E78.5]  Yes      Resolved Hospital Problems   No resolved problems to display.       Left-sided headache in a patient with a history of MS/seizure activity.  Mostly migraine.  Improved on Topamax/migraine medication.  CNS examination is negative.  CT scan of the brain without contrast is negative.  Negative CTA of the brain except for surgical changes of the right orbit floor.  Negative MRI of the brain.  Positive family history of migraine.  No seizure activity no clinical evidence of temporal arteritis with normal ESR and only mildly elevated CRP.  Differential diagnoses include migraine/MS flareup (much less likely).  Continue Topamax and continue Reglan and DHE.  Continue the patient on  her Lamictal.  Patient has received ocrelizumab last in June.  Status post neurology consult.    PTSD/personality disorder/mood disorder.  Patient with periods of confusion and possible confabulation.  We will ask psychiatry to see.  Continue Lamictal   THC use.  Patient reports that she okay extremity uses marijuana.  Counseled.  Hypokalemia.  Normal magnesium.  Continue substitution.    Hyperglycemia.  History of glucose intolerance.  A1c is normal at 5.  DDD/generalized osteoarthritis/chronic pain syndrome/opioid dependence.  Continue the patient on her hydrocodone.  Obstructive sleep apnea.  Continue CPAP.  Dyslipidemia.  Continue Lipitor.  VTE prophylaxis.  Lovenox.     Discussed my findings and plan of treatment with the patient/nurse/.  Disposition.  Hopefully home tomorrow after neurology evaluation today/psychiatry evaluation/physical therapy evaluation.              Nicole Leiva MD  [facility]  09/04/23  08:43 EDT
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
## 2023-09-04 NOTE — PLAN OF CARE
Goal Outcome Evaluation:  Plan of Care Reviewed With: patient           Outcome Evaluation: Pt. is a 58 year old female admitted to [facility] due to severe L side headache. Pt. has PMH of MS. Today, pt. performs BM with Mod A, STS from EOB and BSC with Min A and HHA. Pt. able to tolerate ~5-6 minutes on BSC without LOB or c/o light headedness or HA. When t/f'ing back to bed, pt. requires Min-Mod A with HHA x2. Pt. was able to take 2-3 side steps up towards HOB, although very unsteady. PT recommending SNF at this time due to home environment and level of assist needed at this time for mobility. PT will continue to follow for further D/C recs/changes pending progress.      Anticipated Discharge Disposition (PT): skilled nursing facility

## 2023-09-04 NOTE — PLAN OF CARE
Problem: Fall Injury Risk  Goal: Absence of Fall and Fall-Related Injury  Intervention: Promote Injury-Free Environment  Recent Flowsheet Documentation  Taken 9/4/2023 1539 by Camilla Fernandez RN  Safety Promotion/Fall Prevention:   clutter free environment maintained   assistive device/personal items within reach   activity supervised   fall prevention program maintained   nonskid shoes/slippers when out of bed   room organization consistent   safety round/check completed  Taken 9/4/2023 1410 by Camilla Fernandez RN  Safety Promotion/Fall Prevention:   assistive device/personal items within reach   clutter free environment maintained   nonskid shoes/slippers when out of bed   room organization consistent   safety round/check completed   activity supervised   fall prevention program maintained  Taken 9/4/2023 1200 by Camilla Fernandez RN  Safety Promotion/Fall Prevention:   activity supervised   assistive device/personal items within reach   clutter free environment maintained   fall prevention program maintained   nonskid shoes/slippers when out of bed   safety round/check completed   room organization consistent  Taken 9/4/2023 1000 by Camilla Fernandez RN  Safety Promotion/Fall Prevention:   clutter free environment maintained   assistive device/personal items within reach   activity supervised   fall prevention program maintained   nonskid shoes/slippers when out of bed   safety round/check completed   room organization consistent  Taken 9/4/2023 0810 by Camilla Fernandez RN  Safety Promotion/Fall Prevention:   clutter free environment maintained   assistive device/personal items within reach   activity supervised   fall prevention program maintained   nonskid shoes/slippers when out of bed   safety round/check completed   room organization consistent     Problem: Pain Chronic (Persistent) (Comorbidity Management)  Goal: Acceptable Pain Control and Functional Ability  Intervention: Manage Persistent  Pain  Recent Flowsheet Documentation  Taken 9/4/2023 1000 by Camilla Fernandez RN  Sleep/Rest Enhancement:   room darkened   relaxation techniques promoted     Problem: Adult Inpatient Plan of Care  Goal: Plan of Care Review  Flowsheets (Taken 9/4/2023 1739)  Progress: no change  Plan of Care Reviewed With: patient  Outcome Evaluation: disoriented to situation, slow to respond to questions, restless at times, VSS, room, air, up with assist with walker, gait unsteady, fall precautions in place,  Goal: Absence of Hospital-Acquired Illness or Injury  Intervention: Identify and Manage Fall Risk  Recent Flowsheet Documentation  Taken 9/4/2023 1539 by Camilla Fernandez RN  Safety Promotion/Fall Prevention:   clutter free environment maintained   assistive device/personal items within reach   activity supervised   fall prevention program maintained   nonskid shoes/slippers when out of bed   room organization consistent   safety round/check completed  Taken 9/4/2023 1410 by Camilla Fernandez RN  Safety Promotion/Fall Prevention:   assistive device/personal items within reach   clutter free environment maintained   nonskid shoes/slippers when out of bed   room organization consistent   safety round/check completed   activity supervised   fall prevention program maintained  Taken 9/4/2023 1200 by Camilla Fernandez RN  Safety Promotion/Fall Prevention:   activity supervised   assistive device/personal items within reach   clutter free environment maintained   fall prevention program maintained   nonskid shoes/slippers when out of bed   safety round/check completed   room organization consistent  Taken 9/4/2023 1000 by Camilla Fernandez RN  Safety Promotion/Fall Prevention:   clutter free environment maintained   assistive device/personal items within reach   activity supervised   fall prevention program maintained   nonskid shoes/slippers when out of bed   safety round/check completed   room organization  consistent  Taken 9/4/2023 0810 by Camilla Fernandez RN  Safety Promotion/Fall Prevention:   clutter free environment maintained   assistive device/personal items within reach   activity supervised   fall prevention program maintained   nonskid shoes/slippers when out of bed   safety round/check completed   room organization consistent  Intervention: Prevent Skin Injury  Recent Flowsheet Documentation  Taken 9/4/2023 1200 by Camilla Fernandez RN  Body Position:   right   tilted  Taken 9/4/2023 1000 by Camilla Fernandez RN  Body Position: supine  Intervention: Prevent and Manage VTE (Venous Thromboembolism) Risk  Recent Flowsheet Documentation  Taken 9/4/2023 1539 by Camilla Fernandez RN  Activity Management:   up in chair   ambulated to bathroom  Taken 9/4/2023 1410 by Camilla Fernandez RN  Activity Management: up in chair  Taken 9/4/2023 1000 by Camilla Fernandez RN  Activity Management: activity minimized  Taken 9/4/2023 0810 by Camilla Fernandez RN  Activity Management: up in chair   Goal Outcome Evaluation:  Plan of Care Reviewed With: patient        Progress: no change  Outcome Evaluation: disoriented to situation, slow to respond to questions, restless at times, VSS, room, air, up with assist with walker, gait unsteady, fall precautions in place,

## 2023-09-04 NOTE — PROGRESS NOTES
"DOS: 2023  NAME: Annemarie Ha   : 1965  PCP: Provider, No Known  Chief Complaint   Patient presents with    Headache     Worst HA of Life     Stroke    Subjective: Per nursing staff patient was fairly normal yesterday then she came back from MRI and was very confused, picking at things, and chewing on sensors.  Patient reportedly told nursing that she had taken a THC gummy, denied in hospital use to me but does report taking THC Gummies prior to admission.  She has also received Dilaudid and Ativan.  Currently she denies headache, getting DHE and Reglan every 8 hours.  She continues to be confused. CPAP nasal pillow in place. Pt seen in follow up today, however the problem is new to the examiner.      Objective:  Vital signs: /85 (BP Location: Right arm, Patient Position: Lying)   Pulse 76   Temp 98.6 °F (37 °C) (Oral)   Resp 18   Ht 166.4 cm (65.5\")   Wt 117 kg (258 lb 8 oz)   SpO2 97%   BMI 42.36 kg/m²       General appearance: Well developed, well nourished, alert and cooperative.   HEENT: Normocephalic.   Neck: Supple.   Cardiac: Regular rate and rhythm. No murmurs.   Peripheral Vasculature: Radial pulses are equal and symmetric.  Chest Exam: Clear to auscultation bilaterally, no wheezes, no rhonchi.  Extremities: Normal, no edema.   Skin: No rashes or birthmarks.     Higher integrative function: Awake but drowsy, oriented to self, month, and year.  States she is at [facility].  Impaired recent/remote memory, attention/concentration.  Speech fluent.  No neglect.  CN II: Visual fields grossly intact.  CN III IV VI: Extraocular movements are full without nystagmus. Pupils are equal, round, and reactive to light.   CN V: Normal facial sensation.  CN VII: Facial movements are symmetric, no weakness.   CN VIII: Auditory acuity is normal.   CN IX & X: Symmetric palatal movement.   CN XI: Sternocleidomastoid and trapezius are normal. No weakness.   CN XII: The tongue is midline. " No atrophy or fasciculations.   Motor: Normal muscle strength, bulk, and tone in upper and lower extremities. No fasciculations, rigidity, spasticity or abnormal movements.   Sensation: Intact/metric to light touch in arms and legs.  Station and gait: Deferred.  Coordination: Finger to nose test showed no dysmetria.    Scheduled Meds:aspirin, 81 mg, Oral, Daily  cloNIDine, 0.1 mg, Oral, Nightly  enoxaparin, 40 mg, Subcutaneous, Q12H  famotidine, 40 mg, Oral, Daily  HYDROcodone-acetaminophen, 1 tablet, Oral, BID AC  lamoTRIgine, 200 mg, Oral, BID  magnesium oxide, 400 mg, Oral, Daily  potassium chloride, 10 mEq, Oral, Daily  potassium chloride ER, 40 mEq, Oral, Q4H  senna-docusate sodium, 2 tablet, Oral, BID  sodium chloride, 10 mL, Intravenous, Q12H  topiramate, 50 mg, Oral, Nightly  vitamin B-12, 1,000 mcg, Oral, Daily  Vitamin B-2, 400 mg, Oral, Daily  vitamin D, 50,000 Units, Oral, Weekly      Continuous Infusions:   PRN Meds:.  acetaminophen **OR** acetaminophen **OR** acetaminophen    albuterol sulfate HFA    senna-docusate sodium **AND** polyethylene glycol **AND** bisacodyl **AND** bisacodyl    Calcium Replacement - Follow Nurse / BPA Driven Protocol    HYDROmorphone **AND** naloxone    loperamide    Magnesium Standard Dose Replacement - Follow Nurse / BPA Driven Protocol    nitroglycerin    ondansetron **OR** ondansetron    Phosphorus Replacement - Follow Nurse / BPA Driven Protocol    Potassium Replacement - Follow Nurse / BPA Driven Protocol    [COMPLETED] Insert Peripheral IV **AND** sodium chloride    sodium chloride    sodium chloride    Laboratory results:  Lab Results   Component Value Date    GLUCOSE 99 09/04/2023    CALCIUM 9.2 09/04/2023     (L) 09/04/2023    K 3.4 (L) 09/04/2023    CO2 24.0 09/04/2023    CL 98 09/04/2023    BUN 7 (L) 09/04/2023    CREATININE 0.89 09/04/2023    EGFRIFAFRI 92 02/08/2016    EGFRIFNONA 77 11/29/2017    BCR 7.9 09/04/2023    ANIONGAP 12.0 09/04/2023     Lab  Results   Component Value Date    WBC 5.52 09/04/2023    HGB 11.6 (L) 09/04/2023    HCT 33.4 (L) 09/04/2023    MCV 89.5 09/04/2023     09/04/2023     Lab Results   Component Value Date    CHOL 208 (H) 11/29/2017    CHOL 228 (H) 08/22/2017     Lab Results   Component Value Date    HDL 65 06/17/2019    HDL 67 (H) 11/29/2017    HDL 62 08/22/2017     Lab Results   Component Value Date     (H) 06/17/2019     (H) 11/29/2017     (H) 08/22/2017     Lab Results   Component Value Date    TRIG 59 06/17/2019    TRIG 121 11/29/2017    TRIG 89 08/22/2017         Lab 09/02/23  1645   HEMOGLOBIN A1C 5.00      Review and interpretation of imaging: MRI brain images viewed by me, no acute findings seen.  CT Head Without Contrast    Result Date: 9/2/2023  HEAD CT  HISTORY: Left side headache.  TECHNIQUE: Noncontrast head CT is provided. Comparison: None.  FINDINGS: Screw-plate hardware along the inferior right orbital rim from old fracture repair. Paranasal sinuses are clear. 0The ventricles are normal in caliber. The brain parenchyma and extra-axial spaces appear normal. There is no hemorrhage or acute ischemic change. The bones of the skull appear normal. The mastoid air cells, middle ears, and visualized paranasal sinuses are clear.      Negative head CT.  Radiation dose reduction techniques were utilized, including automated exposure control and exposure modulation based on body size.   This report was finalized on 9/2/2023 11:08 AM by Dr. Loco Barlow M.D.      MRI Brain Without Contrast    Result Date: 9/3/2023  BRAIN MRI WITHOUT CONTRAST  HISTORY: Severe headache left-sided; R51.9-Headache, unspecified; G35-Multiple sclerosis  COMPARISON: September 2, 2023.  FINDINGS:  Multiplanar images of the head were obtained without gadolinium. No areas of restricted diffusion are seen to suggest acute infarct. Ventricles are normal in size. There is no midline shift or mass effect. There is some mild  periventricular microangiopathic change. No abnormality is seen on gradient echo imaging. There is artifact overlying the right orbit, related to prior plate and screw fixation of the anterior wall of the right maxillary sinus. No T2 weighted imaging or postcontrast imaging was obtained, as the patient could not further tolerate the examination.      1. No acute intracranial abnormality. Please note, T2 and postcontrast imaging was not obtained, as the patient could not tolerate any further imaging.   This report was finalized on 9/3/2023 5:13 AM by Dr. Kelsie Chowdhury M.D.      CT Angiogram Head    Result Date: 9/3/2023  CONTRAST-ENHANCED CT ANGIOGRAM OF THE HEAD 09/03/2023  CLINICAL HISTORY: Severe headache.  TECHNIQUE: Spiral CT images were obtained from the base of the skull to the vertex both pre-intravenous and postintravenous contrast and images were reformatted and submitted in 3 mm thick axial, sagittal and coronal CT sections with brain algorithm. Additional spiral CT angiographic images were obtained from the C1-2 cervical level up through the vertex of the skull during the arterial phase of contrast and the images were reformatted and submitted in 1 mm thick axial, sagittal and coronal CT sections with soft tissue algorithm and 3D reconstructions were performed to complete the CT angiogram of the head.  This is correlated to a CT of the head without contrast yesterday 09/02/2023 at 10:30 a.m. and an MRI of the brain without contrast earlier this morning 09/03/2023 at 1:30 a.m.  FINDINGS:  HEAD CT: The brain parenchyma is normal in attenuation. The ventricles are normal in size. I see no focal mass effect. There is no midline shift. No extra-axial fluid collections are identified. There is no evidence of acute intra-cranial hemorrhage. Following contrast no abnormal areas of enhancement are seen in the head. The calvarium and the skull base are normal in appearance. There is an anterior plate over the  anterior wall of the right maxillary sinus. The paranasal sinuses, the mastoid air cells and middle ear cavities are clear.  CT ANGIOGRAM OF THE HEAD:  The visualized portions of the distal vertebral arteries are normal in appearance from the C1-2 cervical level to the vertebrobasilar junction. 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 This finding is best demonstrated on coronal thin cut CTA images 119-123. The basilar artery and the basilar tip is normal in appearance. The posterior cerebral and superior cerebellar arteries are within normal limits. The upper cervical, petrous, cavernous and supracavernous segments of the internal carotid arteries are within normal limits. The left posterior communicating artery origin is within normal limits.  The A1 segments of the anterior cerebral arteries and the anterior communicating artery origin and A2 segments of the anterior cerebral arteries are within normal limits. The M1 segments of the middle cerebral arteries and the middle cerebral artery bifurcations are within normal limits.      1. There is an anterior plate projecting over the junction of the anterior wall of the right maxillary sinus and anterior right orbital floor from previous surgery and correlation with surgical history is suggested.  2. 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  Radiation dose reduction techniques were utilized, including automated exposure control and exposure modulation based on body size.   This report was finalized on 9/3/2023 10:13 PM by Dr. Joselo Urbano M.D.       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 Tien Boateng, RANJANA compliant with CPAP, and PTSD who presented 9/2 with complaints of a severe headache.  Patient reported headache for the last 6 months which has been increasing in intensity and frequency and became more severe just prior to admission.  Headache is left orbital and temporal with associated blurred vision.    Initial MRI brain without contrast was completed and was unremarkable however given her history of MS, optic neuritis, with current complaints of orbital pain MRI with and without contrast and orbital MRI were ordered however she went down for these and these cannot be completed due to acute confusion which has persisted overnight.    She was started on DHE/Reglan every 8 hours on admission for her headache which is currently resolved.  She has also been started on magnesium oxide and riboflavin this admission.    Additional work-up:   CTA head: Anterior plate over the right maxillary sinus and anterior right orbital floor from previous surgery.  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  Labs: UDS positive for THC.,  TSH0.853, hemoglobin A1c 5.0%, respiratory panel negative, blood cultures no growth to date, CRP 0.95, sedimentation rate 15.    Diagnosis.  Headache, orbital and temporal  Acute encephalopathy  Reported MS/optic neuritis, on Ocrevus infusion  RANJANA on CPAP    Plan:  We will stop DHE/Reglan for now in case this is adding to her confusion, Also recommend caution with sedating medications  D/C additional MRIs for now  D/W Dr Leavitt today. Will follow clinically for now for return to baseline.

## 2023-09-05 LAB
ANION GAP SERPL CALCULATED.3IONS-SCNC: 14.4 MMOL/L (ref 5–15)
BASOPHILS # BLD AUTO: 0.01 10*3/MM3 (ref 0–0.2)
BASOPHILS NFR BLD AUTO: 0.2 % (ref 0–1.5)
BUN SERPL-MCNC: 9 MG/DL (ref 8–23)
BUN/CREAT SERPL: 10.6 (ref 7–25)
CALCIUM SPEC-SCNC: 9.6 MG/DL (ref 8.6–10.5)
CHLORIDE SERPL-SCNC: 101 MMOL/L (ref 98–107)
CO2 SERPL-SCNC: 18.6 MMOL/L (ref 22–29)
CREAT SERPL-MCNC: 0.85 MG/DL (ref 0.57–1)
DEPRECATED RDW RBC AUTO: 38.9 FL (ref 37–54)
EGFRCR SERPLBLD CKD-EPI 2021: 78.5 ML/MIN/1.73
EOSINOPHIL # BLD AUTO: 0.04 10*3/MM3 (ref 0–0.4)
EOSINOPHIL NFR BLD AUTO: 0.7 % (ref 0.3–6.2)
ERYTHROCYTE [DISTWIDTH] IN BLOOD BY AUTOMATED COUNT: 12 % (ref 12.3–15.4)
GLUCOSE SERPL-MCNC: 95 MG/DL (ref 65–99)
HCT VFR BLD AUTO: 36.4 % (ref 34–46.6)
HGB BLD-MCNC: 12.3 G/DL (ref 12–15.9)
IMM GRANULOCYTES # BLD AUTO: 0.03 10*3/MM3 (ref 0–0.05)
IMM GRANULOCYTES NFR BLD AUTO: 0.5 % (ref 0–0.5)
LYMPHOCYTES # BLD AUTO: 0.66 10*3/MM3 (ref 0.7–3.1)
LYMPHOCYTES NFR BLD AUTO: 12.1 % (ref 19.6–45.3)
MCH RBC QN AUTO: 30.4 PG (ref 26.6–33)
MCHC RBC AUTO-ENTMCNC: 33.8 G/DL (ref 31.5–35.7)
MCV RBC AUTO: 89.9 FL (ref 79–97)
MONOCYTES # BLD AUTO: 0.48 10*3/MM3 (ref 0.1–0.9)
MONOCYTES NFR BLD AUTO: 8.8 % (ref 5–12)
NEUTROPHILS NFR BLD AUTO: 4.24 10*3/MM3 (ref 1.7–7)
NEUTROPHILS NFR BLD AUTO: 77.7 % (ref 42.7–76)
NRBC BLD AUTO-RTO: 0 /100 WBC (ref 0–0.2)
PLATELET # BLD AUTO: 229 10*3/MM3 (ref 140–450)
PMV BLD AUTO: 10.7 FL (ref 6–12)
POTASSIUM SERPL-SCNC: 4.7 MMOL/L (ref 3.5–5.2)
RBC # BLD AUTO: 4.05 10*6/MM3 (ref 3.77–5.28)
SODIUM SERPL-SCNC: 134 MMOL/L (ref 136–145)
WBC NRBC COR # BLD: 5.46 10*3/MM3 (ref 3.4–10.8)

## 2023-09-05 PROCEDURE — 97110 THERAPEUTIC EXERCISES: CPT

## 2023-09-05 PROCEDURE — 85025 COMPLETE CBC W/AUTO DIFF WBC: CPT | Performed by: INTERNAL MEDICINE

## 2023-09-05 PROCEDURE — 99232 SBSQ HOSP IP/OBS MODERATE 35: CPT

## 2023-09-05 PROCEDURE — 25010000002 ENOXAPARIN PER 10 MG: Performed by: INTERNAL MEDICINE

## 2023-09-05 PROCEDURE — 97530 THERAPEUTIC ACTIVITIES: CPT

## 2023-09-05 PROCEDURE — 80048 BASIC METABOLIC PNL TOTAL CA: CPT | Performed by: INTERNAL MEDICINE

## 2023-09-05 RX ORDER — HALOPERIDOL 5 MG/ML
5 INJECTION INTRAMUSCULAR EVERY 6 HOURS PRN
Status: DISCONTINUED | OUTPATIENT
Start: 2023-09-05 | End: 2023-09-11

## 2023-09-05 RX ADMIN — Medication 1000 MCG: at 08:21

## 2023-09-05 RX ADMIN — POTASSIUM CHLORIDE 10 MEQ: 750 TABLET, EXTENDED RELEASE ORAL at 08:22

## 2023-09-05 RX ADMIN — TOPIRAMATE 50 MG: 50 TABLET, FILM COATED ORAL at 20:07

## 2023-09-05 RX ADMIN — MAGNESIUM OXIDE 400 MG (241.3 MG MAGNESIUM) TABLET 400 MG: TABLET at 08:21

## 2023-09-05 RX ADMIN — ENOXAPARIN SODIUM 40 MG: 100 INJECTION SUBCUTANEOUS at 08:21

## 2023-09-05 RX ADMIN — CLONIDINE HYDROCHLORIDE 0.1 MG: 0.1 TABLET ORAL at 20:07

## 2023-09-05 RX ADMIN — LAMOTRIGINE 200 MG: 100 TABLET ORAL at 08:21

## 2023-09-05 RX ADMIN — Medication 10 ML: at 20:07

## 2023-09-05 RX ADMIN — ENOXAPARIN SODIUM 40 MG: 100 INJECTION SUBCUTANEOUS at 20:06

## 2023-09-05 RX ADMIN — HYDROCODONE BITARTRATE AND ACETAMINOPHEN 1 TABLET: 7.5; 325 TABLET ORAL at 18:11

## 2023-09-05 RX ADMIN — Medication 10 ML: at 08:22

## 2023-09-05 RX ADMIN — FAMOTIDINE 40 MG: 20 TABLET, FILM COATED ORAL at 08:21

## 2023-09-05 RX ADMIN — Medication 400 MG: at 08:21

## 2023-09-05 RX ADMIN — ASPIRIN 81 MG: 81 TABLET, COATED ORAL at 08:21

## 2023-09-05 RX ADMIN — HYDROCODONE BITARTRATE AND ACETAMINOPHEN 1 TABLET: 7.5; 325 TABLET ORAL at 08:21

## 2023-09-05 RX ADMIN — LAMOTRIGINE 200 MG: 100 TABLET ORAL at 20:07
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
## 2023-09-05 NOTE — DISCHARGE PLACEMENT REQUEST
"Annemarie Campos (58 y.o. Female)       Date of Birth   1965    Social Security Number       Address   [address] [place] [address]    Home Phone   [phone]    MRN   [id number]       Orthodoxy   None    Marital Status                               Admission Date   9/2/23    Admission Type   Emergency    Admitting Provider       Attending Provider   Joselo German MD    Department, Room/Bed   [address], [address]/1       Discharge Date       Discharge Disposition       Discharge Destination                                 Attending Provider: Joselo German MD    Allergies: Glatiramer, Diclofenac, Glatiramer Acetate    Isolation: None   Infection: None   Code Status: CPR    Ht: 166.4 cm (65.5\")   Wt: 117 kg (258 lb 8 oz)    Admission Cmt: None   Principal Problem: Migraine [G43.909]                   Active Insurance as of 9/2/2023       Primary Coverage       Payor Plan Insurance Group Employer/Plan Group    ANTHEM MEDICARE REPLACEMENT ANTHEM MEDICARE ADVANTAGE [id number]       Payor Plan Address Payor Plan Phone Number Payor Plan Fax Number Effective Dates    [address] [phone]  1/1/2022 - None Entered    [facility] [address]         Subscriber Name Subscriber Birth Date Member ID       ANNEMARIE CAMPOS 1965 [id number]                     Emergency Contacts        (Rel.) Home Phone Work Phone Mobile Phone    Giovany Ha (Spouse) -- -- [phone]    Jenniffer Junior (Friend) -- -- [phone]                "

## 2023-09-05 NOTE — PLAN OF CARE
Goal Outcome Evaluation:  Plan of Care Reviewed With: patient           Outcome Evaluation: Patient oriented to person only and confused this date. Incr processing time required. Min-modA to achieve EOB and attempted 4 STS with maxA. Weakness limiting. PT will continue to follow and advance as able.

## 2023-09-05 NOTE — PROGRESS NOTES
" LOS: 0 days     Name: Annemarie Ha  Age: 58 y.o.  Sex: female  :  1965  MRN: [id number]         Primary Care Physician: Provider, No Known    Subjective   Subjective  Oriented only to person today.  Required placement of soft wrist restraints last evening for restlessness and agitation    Objective   Vital Signs  Temp:  [97.9 °F (36.6 °C)-99.5 °F (37.5 °C)] 97.9 °F (36.6 °C)  Heart Rate:  [73-84] 79  Resp:  [18] 18  BP: (132-157)/(73-91) 157/91  Body mass index is 42.36 kg/m².    Objective:  General Appearance:  Comfortable and in no acute distress (Pleasant and cooperative.  Conversant but oriented only to person.  Bilateral soft restraints in place.  Not presently agitated.).    Vital signs: (most recent): Blood pressure 157/91, pulse 79, temperature 97.9 °F (36.6 °C), temperature source Oral, resp. rate 18, height 166.4 cm (65.5\"), weight 117 kg (258 lb 8 oz), SpO2 98 %.    Lungs:  Normal effort and normal respiratory rate.  She is not in respiratory distress.  There are decreased breath sounds.    Heart: Normal rate.  Regular rhythm.    Abdomen: Abdomen is soft.  Bowel sounds are normal.   There is no abdominal tenderness.     Extremities: There is no dependent edema or local swelling.    Neurological: Patient is alert.  (Only oriented to person).    Skin:  Warm and dry.              Results Review:       I reviewed the patient's new clinical results.    Results from last 7 days   Lab Units 23  0552 23  0546 23  0515 23  1000   WBC 10*3/mm3 5.46 5.52 5.61 7.92   HEMOGLOBIN g/dL 12.3 11.6* 11.9* 12.3   PLATELETS 10*3/mm3 229 209 189 186     Results from last 7 days   Lab Units 23  0551 23  2121 23  0546 23  0515 23  0023 23  1000   SODIUM mmol/L 134*  --  134* 136  --  135*   POTASSIUM mmol/L 4.7 4.1 3.4* 3.8 4.0 3.3*   CHLORIDE mmol/L 101  --  98 102  --  99   CO2 mmol/L 18.6*  --  24.0 24.4  --  23.5   BUN mg/dL 9  --  7* 13  --  13 "   CREATININE mg/dL 0.85  --  0.89 0.89  --  0.84   CALCIUM mg/dL 9.6  --  9.2 9.1  --  9.2   GLUCOSE mg/dL 95  --  99 94  --  116*                 Scheduled Meds:   aspirin, 81 mg, Oral, Daily  cloNIDine, 0.1 mg, Oral, Nightly  enoxaparin, 40 mg, Subcutaneous, Q12H  famotidine, 40 mg, Oral, Daily  HYDROcodone-acetaminophen, 1 tablet, Oral, BID AC  lamoTRIgine, 200 mg, Oral, BID  magnesium oxide, 400 mg, Oral, Daily  potassium chloride, 10 mEq, Oral, Daily  senna-docusate sodium, 2 tablet, Oral, BID  sodium chloride, 10 mL, Intravenous, Q12H  topiramate, 50 mg, Oral, Nightly  vitamin B-12, 1,000 mcg, Oral, Daily  Vitamin B-2, 400 mg, Oral, Daily  vitamin D, 50,000 Units, Oral, Weekly      PRN Meds:     acetaminophen **OR** acetaminophen **OR** acetaminophen    albuterol sulfate HFA    senna-docusate sodium **AND** polyethylene glycol **AND** bisacodyl **AND** bisacodyl    Calcium Replacement - Follow Nurse / BPA Driven Protocol    HYDROmorphone **AND** naloxone    loperamide    Magnesium Standard Dose Replacement - Follow Nurse / BPA Driven Protocol    nitroglycerin    ondansetron **OR** ondansetron    Phosphorus Replacement - Follow Nurse / BPA Driven Protocol    Potassium Replacement - Follow Nurse / BPA Driven Protocol    [COMPLETED] Insert Peripheral IV **AND** sodium chloride    sodium chloride    sodium chloride  Continuous Infusions:       Assessment & Plan   Active Hospital Problems    Diagnosis  POA    **Migraine [G43.909]  Yes    Tetrahydrocannabinol (THC) use disorder, mild, abuse [F12.10]  Yes    Hypokalemia [E87.6]  Yes    Glucose intolerance [E74.39]  Yes    Chronic pain syndrome [G89.4]  Yes    Opioid dependence [F11.20]  Yes    Mood disorder [F39]  Yes    Left-sided headache [R51.9]  Yes    Obstructive sleep apnea [G47.33]  Yes    Dyslipidemia [E78.5]  Yes      Resolved Hospital Problems   No resolved problems to display.       Assessment & Plan    Left-sided headache in a patient with a history of  MS/seizure activity.  Mostly migraine.  Improved on Topamax/migraine medication.  CNS examination is negative.  CT scan of the brain without contrast is negative.  Negative CTA of the brain except for surgical changes of the right orbit floor.  Negative MRI of the brain.  Positive family history of migraine.  No seizure activity no clinical evidence of temporal arteritis with normal ESR and only mildly elevated CRP.  Neurology following and adjusting medications.  PTSD/personality disorder/mood disorder.  Patient with periods of confusion and possible confabulation.  Psychiatry consulted.  She required placement of soft wrist restraints overnight.  THC use.   Hypokalemia.  Normal magnesium.  Continue substitution.    Hyperglycemia.  History of glucose intolerance.  A1c is normal at 5.  DDD/generalized osteoarthritis/chronic pain syndrome/opioid dependence.  Continue the patient on her hydrocodone.  Obstructive sleep apnea.  Continue CPAP.  Dyslipidemia.  Continue Lipitor.  VTE prophylaxis.  Lovenox.      Expected Discharge Date: 9/5/2023; Expected Discharge Time:      Joselo German MD  [facility]  09/05/23  10:10 EDT

## 2023-09-05 NOTE — CASE MANAGEMENT/SOCIAL WORK
Discharge Planning Assessment  [facility]     Patient Name: Annemarie Ha  MRN: [id number]  Today's Date: 9/5/2023    Admit Date: 9/2/2023    Plan: TBD   Discharge Needs Assessment       Row Name 09/05/23 0934       Living Environment    People in Home spouse    Current Living Arrangements apartment    Potentially Unsafe Housing Conditions none    Primary Care Provided by self    Provides Primary Care For no one    Family Caregiver if Needed spouse    Quality of Family Relationships supportive       Transition Planning    Patient/Family Anticipates Transition to inpatient rehabilitation facility    Patient/Family Anticipated Services at Transition skilled nursing    Transportation Anticipated family or friend will provide       Discharge Needs Assessment    Readmission Within the Last 30 Days no previous admission in last 30 days    Equipment Currently Used at Home cane, straight;bipap    Concerns to be Addressed discharge planning    Anticipated Changes Related to Illness inability to care for self    Provided Post Acute Provider List? Yes    Post Acute Provider List Nursing Home    Provided Post Acute Provider Quality & Resource List? Yes    Post Acute Provider Quality and Resource List Nursing Home    Delivered To Support Person    Method of Delivery Telephone    Current Discharge Risk physical impairment                   Discharge Plan       Row Name 09/05/23 0935       Plan    Plan TBD    Plan Comments Pt is confused, in restraints.  S/W pt's spouse Giovany by phone ([phone]).   Facesheet info confirmed.  Pt lives with Giovany in a 1st floor apartment.  She is usually IADLs and can drive short distances.  Home DME includes a cane and bipap.  No hx of HH or SNF. Pt's spouse states she is normally A&O and able to take care of herself at home.  She even assists another elderly friend at times. CCP disussed possible DC options including  SNF which is currently recommended by PT.  Giovany is in  agreement w/ SNF upon DC.  Facility list given and discussed Medicare.gov for facility options and ratings.   He requests referrals to facilities close to their home.  CCP will send referrals, evals pending. Pt's spouse states it is OK to talk w/ pt's friend Jenniffer Junior regarding DC plans.  Her contact # placed in record ............Samreen CUMMINS/ [place]                  Continued Care and Services - Admitted Since 9/2/2023    Coordination has not been started for this encounter.       Expected Discharge Date and Time       Expected Discharge Date Expected Discharge Time    Sep 5, 2023            Demographic Summary       Row Name 09/05/23 0933       General Information    Admission Type observation    Arrived From emergency department    Required Notices Provided Observation Status Notice    Referral Source admission list    Reason for Consult discharge planning    Preferred Language English                   Functional Status       Row Name 09/05/23 0933       Functional Status    Usual Activity Tolerance good       Functional Status, IADL    Medications independent    Meal Preparation independent    Housekeeping independent    Laundry independent    Shopping independent       Mental Status    General Appearance WDL WDL       Employment/    Employment Status employed part-time                               Samreen Montague RN

## 2023-09-05 NOTE — PROGRESS NOTES
The patient is sleeping soundly today but wrist restraints are in place and the chart indicates that the patient has become increasingly confused and restless.  I have added as needed Haloperidol to be given for agitation.  The etiology of her mental status changes remain elusive, but may be psychiatric in nature.  Continuing to follow.

## 2023-09-05 NOTE — PLAN OF CARE
Goal Outcome Evaluation:  Plan of Care Reviewed With: patient is confused, was placed on restraints because of restlessness and agitation. She is alert but only oriented to self, situation and place.         Progress: no change

## 2023-09-05 NOTE — PROGRESS NOTES
"DOS: 2023  NAME: Annemarie Ha   : 1965  PCP: Eula Franco APRN  Chief Complaint   Patient presents with    Headache     Worst HA of Life     Neurology    Subjective:     Interval History  Pt seen in follow up today.  She is resting quietly in bed, bilateral soft wrist restraints in place.  She tells me that she does not have a headache.  CPAP machine at bedside.  Patient remains confused.  No family at bedside.  History taken from: patient chart    Part of the note was copied and pasted, but thoroughly reviewed for any corrections.    Objective:  Vital signs: /90 (BP Location: Right arm, Patient Position: Lying)   Pulse 85   Temp 97.9 °F (36.6 °C) (Oral)   Resp 18   Ht 166.4 cm (65.5\")   Wt 117 kg (258 lb 8 oz)   SpO2 99%   BMI 42.36 kg/m²       Physical Exam:  GENERAL: NAD, alert and cooperative  HEENT: Normocephalic, atraumatic   Resp: Even and unlabored, no audible wheezes  Skin: Warm and dry, no rashes or birth marks.   Psychiatric: Normal mood and affect.     Neurological:   MS: Awake and drowsy, oriented to self only, recent/remote memory impaired, decreased attention/concentration, language intact, no neglect   CN: Blinks to threat, does not track with eyes on command, PERRL, EOMI, no nystagmus, no facial droop, no dysarthria, hearing symmetric, tongue midline, shoulder shrug symmetric  Motor: 5/5 strength in all 4 ext. Normal tone.  No tremor, no overshoot or asterixis.   Sensory: Intact to light touch in all four ext.  Coordination: No dysmetria finger to nose test  Gait and station: Deferred    Results Review: I have reviewed MRI brain and see no evidence of an acute infarct   I reviewed the patient's new clinical results.  I reviewed the patient's new imaging results and agree with the interpretation.  I reviewed the patient's other test results and agree with the interpretation  Discussed with Dr. Leavitt    Current Medications:  Scheduled Medications:aspirin, 81 " mg, Oral, Daily  cloNIDine, 0.1 mg, Oral, Nightly  enoxaparin, 40 mg, Subcutaneous, Q12H  famotidine, 40 mg, Oral, Daily  HYDROcodone-acetaminophen, 1 tablet, Oral, BID AC  lamoTRIgine, 200 mg, Oral, BID  magnesium oxide, 400 mg, Oral, Daily  potassium chloride, 10 mEq, Oral, Daily  senna-docusate sodium, 2 tablet, Oral, BID  sodium chloride, 10 mL, Intravenous, Q12H  topiramate, 50 mg, Oral, Nightly  vitamin B-12, 1,000 mcg, Oral, Daily  Vitamin B-2, 400 mg, Oral, Daily  vitamin D, 50,000 Units, Oral, Weekly      Infusions:    PRN Medications:    acetaminophen **OR** acetaminophen **OR** acetaminophen    albuterol sulfate HFA    senna-docusate sodium **AND** polyethylene glycol **AND** bisacodyl **AND** bisacodyl    Calcium Replacement - Follow Nurse / BPA Driven Protocol    haloperidol lactate    HYDROmorphone **AND** naloxone    loperamide    Magnesium Standard Dose Replacement - Follow Nurse / BPA Driven Protocol    nitroglycerin    ondansetron **OR** ondansetron    Phosphorus Replacement - Follow Nurse / BPA Driven Protocol    Potassium Replacement - Follow Nurse / BPA Driven Protocol    [COMPLETED] Insert Peripheral IV **AND** sodium chloride    sodium chloride    sodium chloride    Laboratory results:  Lab Results   Component Value Date    GLUCOSE 95 09/05/2023    CALCIUM 9.6 09/05/2023     (L) 09/05/2023    K 4.7 09/05/2023    CO2 18.6 (L) 09/05/2023     09/05/2023    BUN 9 09/05/2023    CREATININE 0.85 09/05/2023    EGFRIFAFRI 92 02/08/2016    EGFRIFNONA 77 11/29/2017    BCR 10.6 09/05/2023    ANIONGAP 14.4 09/05/2023     Lab Results   Component Value Date    WBC 5.46 09/05/2023    HGB 12.3 09/05/2023    HCT 36.4 09/05/2023    MCV 89.9 09/05/2023     09/05/2023          No results found for: INR, PROTIME  Lab Results   Component Value Date    TSH 0.853 09/02/2023     No components found for: LDLCALC  Lab Results   Component Value Date    HGBA1C 5.00 09/02/2023     No components found for:  B12    Review and interpretation of imaging:   CT Head Without Contrast    Result Date: 9/2/2023  HEAD CT  HISTORY: Left side headache.  TECHNIQUE: Noncontrast head CT is provided. Comparison: None.  FINDINGS: Screw-plate hardware along the inferior right orbital rim from old fracture repair. Paranasal sinuses are clear. 0The ventricles are normal in caliber. The brain parenchyma and extra-axial spaces appear normal. There is no hemorrhage or acute ischemic change. The bones of the skull appear normal. The mastoid air cells, middle ears, and visualized paranasal sinuses are clear.      Negative head CT.  Radiation dose reduction techniques were utilized, including automated exposure control and exposure modulation based on body size.   This report was finalized on 9/2/2023 11:08 AM by Dr. Loco Barlow M.D.      MRI Brain Without Contrast    Result Date: 9/3/2023  BRAIN MRI WITHOUT CONTRAST  HISTORY: Severe headache left-sided; R51.9-Headache, unspecified; G35-Multiple sclerosis  COMPARISON: September 2, 2023.  FINDINGS:  Multiplanar images of the head were obtained without gadolinium. No areas of restricted diffusion are seen to suggest acute infarct. Ventricles are normal in size. There is no midline shift or mass effect. There is some mild periventricular microangiopathic change. No abnormality is seen on gradient echo imaging. There is artifact overlying the right orbit, related to prior plate and screw fixation of the anterior wall of the right maxillary sinus. No T2 weighted imaging or postcontrast imaging was obtained, as the patient could not further tolerate the examination.      1. No acute intracranial abnormality. Please note, T2 and postcontrast imaging was not obtained, as the patient could not tolerate any further imaging.   This report was finalized on 9/3/2023 5:13 AM by Dr. Kelsie Chowdhury M.D.      CT Angiogram Head    Result Date: 9/3/2023  CONTRAST-ENHANCED CT ANGIOGRAM OF THE HEAD  09/03/2023  CLINICAL HISTORY: Severe headache.  TECHNIQUE: Spiral CT images were obtained from the base of the skull to the vertex both pre-intravenous and postintravenous contrast and images were reformatted and submitted in 3 mm thick axial, sagittal and coronal CT sections with brain algorithm. Additional spiral CT angiographic images were obtained from the C1-2 cervical level up through the vertex of the skull during the arterial phase of contrast and the images were reformatted and submitted in 1 mm thick axial, sagittal and coronal CT sections with soft tissue algorithm and 3D reconstructions were performed to complete the CT angiogram of the head.  This is correlated to a CT of the head without contrast yesterday 09/02/2023 at 10:30 a.m. and an MRI of the brain without contrast earlier this morning 09/03/2023 at 1:30 a.m.  FINDINGS:  HEAD CT: The brain parenchyma is normal in attenuation. The ventricles are normal in size. I see no focal mass effect. There is no midline shift. No extra-axial fluid collections are identified. There is no evidence of acute intra-cranial hemorrhage. Following contrast no abnormal areas of enhancement are seen in the head. The calvarium and the skull base are normal in appearance. There is an anterior plate over the anterior wall of the right maxillary sinus. The paranasal sinuses, the mastoid air cells and middle ear cavities are clear.  CT ANGIOGRAM OF THE HEAD:  The visualized portions of the distal vertebral arteries are normal in appearance from the C1-2 cervical level to the vertebrobasilar junction. 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 This finding is best demonstrated on coronal thin cut CTA images 119-123. The basilar artery and the basilar tip is normal in appearance. The posterior cerebral and superior cerebellar arteries are within normal limits. The upper  cervical, petrous, cavernous and supracavernous segments of the internal carotid arteries are within normal limits. The left posterior communicating artery origin is within normal limits.  The A1 segments of the anterior cerebral arteries and the anterior communicating artery origin and A2 segments of the anterior cerebral arteries are within normal limits. The M1 segments of the middle cerebral arteries and the middle cerebral artery bifurcations are within normal limits.      1. There is an anterior plate projecting over the junction of the anterior wall of the right maxillary sinus and anterior right orbital floor from previous surgery and correlation with surgical history is suggested.  2. 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  Radiation dose reduction techniques were utilized, including automated exposure control and exposure modulation based on body size.   This report was finalized on 9/3/2023 10:13 PM by Dr. Joselo Urbano M.D.      XR Chest 1 Vw    Result Date: 2023  REVIEWING YOUR TEST RESULTS IN [facility] IS NOT A SUBSTITUTE FOR DISCUSSING THOSE RESULTS WITH YOUR HEALTH CARE PROVIDER. PLEASE CONTACT YOUR PROVIDER [facility]IA [facility] TO DISCUSS ANY QUESTIONS OR CONCERNS YOU MAY HAVE REGARDING THESE TEST RESULTS.  RADIOLOGY REPORT FACILITY:  [facility] UNIT/AGE/GENDER: M.ED  ER      AGE:58 Y          SEX:F PATIENT NAME/:  GABRIEL LUGO    1965 UNIT NUMBER:  [id number] ACCOUNT NUMBER:  [id number] ACCESSION NUMBER:  [id number] EXAMINATION: XR CHEST 1 VW DATE: 2023 HISTORY: Fever. COMPARISON: None. FINDINGS: A single view of the chest demonstrates clear lungs. There is no pneumothorax or pleural effusion. The heart size and mediastinal  "contour are normal. IMPRESSION: No acute cardiopulmonary radiographic abnormality. Dictated by: Loco Hathaway M.D. Images and Report reviewed and interpreted by: Loco Hathaway M.D. <PS><Electronically signed by: Loco Hathaway M.D.> 08/29/2023 0618 D: 08/29/2023 0617 T: 08/29/2023 0617     Work-up to date:  CTH wo: Negative head CT  MRI brain wo 9/3: No acute intracranial abnormality.  T2 and postcontrast imaging not obtained as patient cannot tolerate further imaging.  CTA head: No intracranial aneurysm seen.    Lab Review: Na+ 134, CO2 18.6, CBC unremarkable, UDS (+) THC    Diagnoses:  New onset headache, improved  Left orbital pain, improved  Acute encephalopathy  History of multiple sclerosis, relapsing remitting type    Impression: Mrs. [place] Ha initially presented to [facility] 9-23 for left-sided temporal headache and eye pain.  She follows with Dr. Tien Boateng for multiple sclerosis.  She reported the headache is ongoing for the past 6 months but it has increased in intensity and frequency with associated blurred vision.  She had an MRI brain without contrast that was unremarkable but due to her history of MS with optic neuritis and current complaints of orbital pain and MRI brain and orbits with and without contrast was ordered however these were unable to be completed due to acute confusion which continues to persist.  She was started on magnesium oxide and riboflavin this admission and was on every 8 hour DHE/Reglan however the latter has been discontinued in case it was contributing to her confusion.  Per chart review she has not received any IV Dilaudid since 9/2. Ativan IV that she received 9/3 prior to MRI is likely no longer culprit as half-life has passed. Reportedly she told RN she took THC gummy in hospital. Psychiatry following, feel her current symptoms \"may be related to a history of a possible personality disorder or may be medication related\". PRN Haldol has been added to her " regimen. We will continue to follow with you, call RRT for acute neurologic change or concern.     Plan:  Delirium precautions  No further MRIs planned at this time    Evidence-based delirium precautions include:     1. Frequent reorientation, reminding patient not questioning patient   2. Shades up during day/down at night   3. TV off except for soft music only   4. Familiar objects at bedside   5. Encourage friends/family to spend the night   6. Minimize anticholingeric medications   7. Minimize polypharmacy   8. Minimize restraints, includes lines and/or foleys and/or feeding tubes as able   9. Minimize overnight checks/vitals to encourage restful consistent sleep patterns   10. Out of bed during day as much as possible     I have discussed the above with the patient, PRANAY Wilcox and Dr. Leavitt who are in agreement with the plan.     Danelle Stewart, SHERMAN  09/05/23  13:39 EDT

## 2023-09-06 PROBLEM — E87.6 HYPOKALEMIA: Status: RESOLVED | Noted: 2023-09-04 | Resolved: 2023-09-06

## 2023-09-06 LAB
ANION GAP SERPL CALCULATED.3IONS-SCNC: 17 MMOL/L (ref 5–15)
BASOPHILS # BLD AUTO: 0.03 10*3/MM3 (ref 0–0.2)
BASOPHILS NFR BLD AUTO: 0.4 % (ref 0–1.5)
BUN SERPL-MCNC: 11 MG/DL (ref 8–23)
BUN/CREAT SERPL: 11.6 (ref 7–25)
CALCIUM SPEC-SCNC: 9.5 MG/DL (ref 8.6–10.5)
CHLORIDE SERPL-SCNC: 100 MMOL/L (ref 98–107)
CO2 SERPL-SCNC: 19 MMOL/L (ref 22–29)
CREAT SERPL-MCNC: 0.95 MG/DL (ref 0.57–1)
DEPRECATED RDW RBC AUTO: 39.8 FL (ref 37–54)
EGFRCR SERPLBLD CKD-EPI 2021: 68.7 ML/MIN/1.73
EOSINOPHIL # BLD AUTO: 0.05 10*3/MM3 (ref 0–0.4)
EOSINOPHIL NFR BLD AUTO: 0.6 % (ref 0.3–6.2)
ERYTHROCYTE [DISTWIDTH] IN BLOOD BY AUTOMATED COUNT: 12.4 % (ref 12.3–15.4)
FOLATE SERPL-MCNC: >20 NG/ML (ref 4.78–24.2)
GLUCOSE SERPL-MCNC: 116 MG/DL (ref 65–99)
HCT VFR BLD AUTO: 37.4 % (ref 34–46.6)
HGB BLD-MCNC: 12.9 G/DL (ref 12–15.9)
IMM GRANULOCYTES # BLD AUTO: 0.04 10*3/MM3 (ref 0–0.05)
IMM GRANULOCYTES NFR BLD AUTO: 0.5 % (ref 0–0.5)
LYMPHOCYTES # BLD AUTO: 0.7 10*3/MM3 (ref 0.7–3.1)
LYMPHOCYTES NFR BLD AUTO: 9.1 % (ref 19.6–45.3)
MCH RBC QN AUTO: 30.4 PG (ref 26.6–33)
MCHC RBC AUTO-ENTMCNC: 34.5 G/DL (ref 31.5–35.7)
MCV RBC AUTO: 88.2 FL (ref 79–97)
MONOCYTES # BLD AUTO: 0.63 10*3/MM3 (ref 0.1–0.9)
MONOCYTES NFR BLD AUTO: 8.2 % (ref 5–12)
NEUTROPHILS NFR BLD AUTO: 6.25 10*3/MM3 (ref 1.7–7)
NEUTROPHILS NFR BLD AUTO: 81.2 % (ref 42.7–76)
NRBC BLD AUTO-RTO: 0 /100 WBC (ref 0–0.2)
PLATELET # BLD AUTO: 320 10*3/MM3 (ref 140–450)
PMV BLD AUTO: 10.5 FL (ref 6–12)
POTASSIUM SERPL-SCNC: 3.8 MMOL/L (ref 3.5–5.2)
RBC # BLD AUTO: 4.24 10*6/MM3 (ref 3.77–5.28)
SODIUM SERPL-SCNC: 136 MMOL/L (ref 136–145)
VIT B12 BLD-MCNC: >2000 PG/ML (ref 211–946)
WBC NRBC COR # BLD: 7.7 10*3/MM3 (ref 3.4–10.8)

## 2023-09-06 PROCEDURE — 25010000002 ENOXAPARIN PER 10 MG: Performed by: INTERNAL MEDICINE

## 2023-09-06 PROCEDURE — 99231 SBSQ HOSP IP/OBS SF/LOW 25: CPT | Performed by: PSYCHIATRY & NEUROLOGY

## 2023-09-06 PROCEDURE — 85025 COMPLETE CBC W/AUTO DIFF WBC: CPT | Performed by: INTERNAL MEDICINE

## 2023-09-06 PROCEDURE — 97530 THERAPEUTIC ACTIVITIES: CPT

## 2023-09-06 PROCEDURE — 25010000002 HALOPERIDOL LACTATE PER 5 MG: Performed by: SPECIALIST

## 2023-09-06 PROCEDURE — 82746 ASSAY OF FOLIC ACID SERUM: CPT | Performed by: INTERNAL MEDICINE

## 2023-09-06 PROCEDURE — 82607 VITAMIN B-12: CPT | Performed by: INTERNAL MEDICINE

## 2023-09-06 PROCEDURE — 80048 BASIC METABOLIC PNL TOTAL CA: CPT | Performed by: INTERNAL MEDICINE

## 2023-09-06 RX ORDER — HALOPERIDOL 1 MG/1
1 TABLET ORAL 3 TIMES DAILY
Status: DISCONTINUED | OUTPATIENT
Start: 2023-09-06 | End: 2023-09-12

## 2023-09-06 RX ADMIN — TOPIRAMATE 50 MG: 50 TABLET, FILM COATED ORAL at 20:47

## 2023-09-06 RX ADMIN — Medication 10 ML: at 20:48

## 2023-09-06 RX ADMIN — ENOXAPARIN SODIUM 40 MG: 100 INJECTION SUBCUTANEOUS at 20:46

## 2023-09-06 RX ADMIN — Medication 1000 MCG: at 08:15

## 2023-09-06 RX ADMIN — HALOPERIDOL LACTATE 5 MG: 5 INJECTION, SOLUTION INTRAMUSCULAR at 08:16

## 2023-09-06 RX ADMIN — ENOXAPARIN SODIUM 40 MG: 100 INJECTION SUBCUTANEOUS at 08:16

## 2023-09-06 RX ADMIN — SENNOSIDES AND DOCUSATE SODIUM 2 TABLET: 50; 8.6 TABLET ORAL at 08:16

## 2023-09-06 RX ADMIN — FAMOTIDINE 40 MG: 20 TABLET, FILM COATED ORAL at 08:15

## 2023-09-06 RX ADMIN — HALOPERIDOL 1 MG: 1 TABLET ORAL at 17:46

## 2023-09-06 RX ADMIN — SENNOSIDES AND DOCUSATE SODIUM 2 TABLET: 50; 8.6 TABLET ORAL at 20:46

## 2023-09-06 RX ADMIN — HYDROCODONE BITARTRATE AND ACETAMINOPHEN 1 TABLET: 7.5; 325 TABLET ORAL at 17:46

## 2023-09-06 RX ADMIN — LAMOTRIGINE 200 MG: 100 TABLET ORAL at 20:46

## 2023-09-06 RX ADMIN — HALOPERIDOL 1 MG: 1 TABLET ORAL at 20:48

## 2023-09-06 RX ADMIN — ASPIRIN 81 MG: 81 TABLET, COATED ORAL at 08:16

## 2023-09-06 RX ADMIN — Medication 10 ML: at 08:17

## 2023-09-06 RX ADMIN — HALOPERIDOL LACTATE 5 MG: 5 INJECTION, SOLUTION INTRAMUSCULAR at 01:10

## 2023-09-06 RX ADMIN — LAMOTRIGINE 200 MG: 100 TABLET ORAL at 08:15

## 2023-09-06 RX ADMIN — POTASSIUM CHLORIDE 10 MEQ: 750 TABLET, EXTENDED RELEASE ORAL at 08:15

## 2023-09-06 RX ADMIN — Medication 400 MG: at 08:15

## 2023-09-06 RX ADMIN — MAGNESIUM OXIDE 400 MG (241.3 MG MAGNESIUM) TABLET 400 MG: TABLET at 08:15

## 2023-09-06 RX ADMIN — CLONIDINE HYDROCHLORIDE 0.1 MG: 0.1 TABLET ORAL at 20:47

## 2023-09-06 RX ADMIN — HYDROCODONE BITARTRATE AND ACETAMINOPHEN 1 TABLET: 7.5; 325 TABLET ORAL at 08:15

## 2023-09-06 NOTE — PLAN OF CARE
"Goal Outcome Evaluation:  Plan of Care Reviewed With: patient, spouse           Outcome Evaluation: Patient was oriented to person and confused. She required increased time to process, had difficulty following commands, and was extremely slow to move. She required maxAx2 to sit up to EOB and sat EOB for ~10 mins SBA/CGA. She was motivated to stand once her  arrived and repeated \"lets go\" to him. She stood minAx2 with HHA from her . She took 3 sidesteps toward HOB and required mod/max cuing to step sideways. Patient will benefit from continued skilled PT addressing limitations in functional mobility to improve safety and activity tolerance.         "
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
## 2023-09-06 NOTE — PROGRESS NOTES
The patient remains in restraints and is noted to be even more confused today.  Her nurse reports that as needed haloperidol has been less than optimally effective in addressing her ongoing behavioral issues.  When seen today she is thoroughly confused and delirious with significant clouding of consciousness.  She is only able to repeat her name when various questions are posed by this physician.   has apparently expressed concern that the patient's Norco may be causing her symptoms but she apparently takes this medication chronically at home related to a history of chronic pain and migraine headache.  I will start a conservative dose of scheduled haloperidol.

## 2023-09-06 NOTE — PLAN OF CARE
Problem: Fall Injury Risk  Goal: Absence of Fall and Fall-Related Injury  Outcome: Ongoing, Progressing  Intervention: Identify and Manage Contributors  Recent Flowsheet Documentation  Taken 9/6/2023 0400 by Vesna Pal RN  Medication Review/Management:   medications reviewed   high-risk medications identified  Taken 9/6/2023 0200 by Vesna Pal RN  Medication Review/Management:   medications reviewed   high-risk medications identified  Self-Care Promotion:   independence encouraged   BADL personal objects within reach   BADL personal routines maintained  Taken 9/6/2023 0000 by Vesna Pal RN  Medication Review/Management:   medications reviewed   high-risk medications identified  Taken 9/5/2023 2200 by Vesna Pal RN  Medication Review/Management:   medications reviewed   high-risk medications identified  Taken 9/5/2023 2000 by Vesna Pal RN  Medication Review/Management:   medications reviewed   high-risk medications identified  Self-Care Promotion:   BADL personal routines maintained   BADL personal objects within reach   independence encouraged   safe use of adaptive equipment encouraged  Intervention: Promote Injury-Free Environment  Recent Flowsheet Documentation  Taken 9/6/2023 0400 by Vesna Pal RN  Safety Promotion/Fall Prevention:   safety round/check completed   room organization consistent   nonskid shoes/slippers when out of bed   muscle strengthening facilitated   lighting adjusted   gait belt   fall prevention program maintained   clutter free environment maintained   assistive device/personal items within reach   activity supervised  Taken 9/6/2023 0200 by Vesna Pal RN  Safety Promotion/Fall Prevention:   safety round/check completed   room organization consistent   nonskid shoes/slippers when out of bed   muscle strengthening facilitated   lighting adjusted   gait belt   fall prevention program maintained   clutter free environment maintained   assistive device/personal items within  reach   activity supervised  Taken 9/6/2023 0000 by Vesna Pal, RN  Safety Promotion/Fall Prevention:   safety round/check completed   room organization consistent   nonskid shoes/slippers when out of bed   muscle strengthening facilitated   mobility aid in reach   lighting adjusted   gait belt   fall prevention program maintained   clutter free environment maintained   assistive device/personal items within reach   activity supervised  Taken 9/5/2023 2200 by Vesna Pal, RN  Safety Promotion/Fall Prevention:   safety round/check completed   room organization consistent   nonskid shoes/slippers when out of bed   muscle strengthening facilitated   lighting adjusted   gait belt   fall prevention program maintained   clutter free environment maintained   assistive device/personal items within reach   activity supervised  Taken 9/5/2023 2000 by Vesna Pal RN  Safety Promotion/Fall Prevention:   safety round/check completed   room organization consistent   nonskid shoes/slippers when out of bed   muscle strengthening facilitated   lighting adjusted   gait belt   fall prevention program maintained   clutter free environment maintained   assistive device/personal items within reach   activity supervised     Problem: Fall Injury Risk  Goal: Absence of Fall and Fall-Related Injury  Intervention: Promote Injury-Free Environment  Recent Flowsheet Documentation  Taken 9/6/2023 0400 by Vesna Pal, RN  Safety Promotion/Fall Prevention:   safety round/check completed   room organization consistent   nonskid shoes/slippers when out of bed   muscle strengthening facilitated   lighting adjusted   gait belt   fall prevention program maintained   clutter free environment maintained   assistive device/personal items within reach   activity supervised  Taken 9/6/2023 0200 by Vesna Pal, RN  Safety Promotion/Fall Prevention:   safety round/check completed   room organization consistent   nonskid shoes/slippers when out of bed    muscle strengthening facilitated   lighting adjusted   gait belt   fall prevention program maintained   clutter free environment maintained   assistive device/personal items within reach   activity supervised  Taken 9/6/2023 0000 by Vesna Pal RN  Safety Promotion/Fall Prevention:   safety round/check completed   room organization consistent   nonskid shoes/slippers when out of bed   muscle strengthening facilitated   mobility aid in reach   lighting adjusted   gait belt   fall prevention program maintained   clutter free environment maintained   assistive device/personal items within reach   activity supervised  Taken 9/5/2023 2200 by Vesna Pal, RN  Safety Promotion/Fall Prevention:   safety round/check completed   room organization consistent   nonskid shoes/slippers when out of bed   muscle strengthening facilitated   lighting adjusted   gait belt   fall prevention program maintained   clutter free environment maintained   assistive device/personal items within reach   activity supervised  Taken 9/5/2023 2000 by Vesna Pal RN  Safety Promotion/Fall Prevention:   safety round/check completed   room organization consistent   nonskid shoes/slippers when out of bed   muscle strengthening facilitated   lighting adjusted   gait belt   fall prevention program maintained   clutter free environment maintained   assistive device/personal items within reach   activity supervised     Problem: Pain Chronic (Persistent) (Comorbidity Management)  Goal: Acceptable Pain Control and Functional Ability  Outcome: Ongoing, Progressing  Intervention: Manage Persistent Pain  Recent Flowsheet Documentation  Taken 9/6/2023 0400 by Vesna Pal RN  Medication Review/Management:   medications reviewed   high-risk medications identified  Taken 9/6/2023 0200 by Vesna Pal, RN  Bowel Elimination Promotion:   adequate fluid intake promoted   ambulation promoted   commode/bedpan at bedside   privacy promoted  Medication  Review/Management:   medications reviewed   high-risk medications identified  Taken 9/6/2023 0000 by Vesna Pal RN  Medication Review/Management:   medications reviewed   high-risk medications identified  Taken 9/5/2023 2200 by Vesna Pal RN  Medication Review/Management:   medications reviewed   high-risk medications identified  Taken 9/5/2023 2000 by Vesna Pal RN  Bowel Elimination Promotion:   privacy promoted   commode/bedpan at bedside   ambulation promoted   adequate fluid intake promoted  Sleep/Rest Enhancement:   awakenings minimized   room darkened   relaxation techniques promoted   noise level reduced  Medication Review/Management:   medications reviewed   high-risk medications identified  Intervention: Develop Pain Management Plan  Recent Flowsheet Documentation  Taken 9/6/2023 0200 by Vesna Pal RN  Pain Management Interventions:   care clustered   pillow support provided   position adjusted   see MAR   quiet environment facilitated  Taken 9/5/2023 2000 by Vesna Pal RN  Pain Management Interventions:   relaxation techniques promoted   quiet environment facilitated   pillow support provided   care clustered  Intervention: Optimize Psychosocial Wellbeing  Recent Flowsheet Documentation  Taken 9/6/2023 0600 by Vesna Pal RN  Diversional Activities: television  Taken 9/6/2023 0400 by Vesna Pal RN  Diversional Activities: television  Taken 9/6/2023 0200 by Vesna Pal RN  Supportive Measures:   verbalization of feelings encouraged   problem-solving facilitated   relaxation techniques promoted   goal-setting facilitated   positive reinforcement provided   counseling provided   active listening utilized  Diversional Activities: television  Family/Support System Care:   support provided   self-care encouraged   presence promoted   involvement promoted   caregiver stress acknowledged  Taken 9/6/2023 0000 by Vesna Pal RN  Diversional Activities: television  Taken 9/5/2023 2200 by Vesna Pal  RN  Diversional Activities: ( AT BEDSIDE)   other (see comments)   television  Taken 9/5/2023 2100 by Vesna Pal RN  Diversional Activities:   television   smartphone  Taken 9/5/2023 2000 by Vesna Pal RN  Supportive Measures:   active listening utilized   verbalization of feelings encouraged   relaxation techniques promoted  Spiritual Activities Assistance: hope instilled  Family/Support System Care:   support provided   presence promoted   self-care encouraged   involvement promoted     Problem: Pain Chronic (Persistent) (Comorbidity Management)  Goal: Acceptable Pain Control and Functional Ability  Intervention: Develop Pain Management Plan  Recent Flowsheet Documentation  Taken 9/6/2023 0200 by Vesna Pal RN  Pain Management Interventions:   care clustered   pillow support provided   position adjusted   see MAR   quiet environment facilitated  Taken 9/5/2023 2000 by Vesna Pal RN  Pain Management Interventions:   relaxation techniques promoted   quiet environment facilitated   pillow support provided   care clustered     Problem: Pain Chronic (Persistent) (Comorbidity Management)  Goal: Acceptable Pain Control and Functional Ability  Intervention: Optimize Psychosocial Wellbeing  Recent Flowsheet Documentation  Taken 9/6/2023 0600 by Vesna Pal RN  Diversional Activities: television  Taken 9/6/2023 0400 by Vesna Pal RN  Diversional Activities: television  Taken 9/6/2023 0200 by Vesna Pal RN  Supportive Measures:   verbalization of feelings encouraged   problem-solving facilitated   relaxation techniques promoted   goal-setting facilitated   positive reinforcement provided   counseling provided   active listening utilized  Diversional Activities: television  Family/Support System Care:   support provided   self-care encouraged   presence promoted   involvement promoted   caregiver stress acknowledged  Taken 9/6/2023 0000 by Vesna Pal RN  Diversional Activities: television  Taken 9/5/2023  2200 by Vesna Pal RN  Diversional Activities: ( AT BEDSIDE)   other (see comments)   television  Taken 9/5/2023 2100 by Vesna Pal RN  Diversional Activities:   television   smartphone  Taken 9/5/2023 2000 by Vesna Pal RN  Supportive Measures:   active listening utilized   verbalization of feelings encouraged   relaxation techniques promoted  Spiritual Activities Assistance: hope instilled  Family/Support System Care:   support provided   presence promoted   self-care encouraged   involvement promoted     Problem: Pain Chronic (Persistent) (Comorbidity Management)  Goal: Acceptable Pain Control and Functional Ability  Intervention: Optimize Psychosocial Wellbeing  Recent Flowsheet Documentation  Taken 9/6/2023 0600 by Vesna Pal RN  Diversional Activities: television  Taken 9/6/2023 0400 by Vesna Pal RN  Diversional Activities: television  Taken 9/6/2023 0200 by Vesna Pal RN  Supportive Measures:   verbalization of feelings encouraged   problem-solving facilitated   relaxation techniques promoted   goal-setting facilitated   positive reinforcement provided   counseling provided   active listening utilized  Diversional Activities: television  Family/Support System Care:   support provided   self-care encouraged   presence promoted   involvement promoted   caregiver stress acknowledged  Taken 9/6/2023 0000 by Vesna Pal RN  Diversional Activities: television  Taken 9/5/2023 2200 by Vesna Pal RN  Diversional Activities: ( AT BEDSIDE)   other (see comments)   television  Taken 9/5/2023 2100 by Vesna Pal RN  Diversional Activities:   television   smartphone  Taken 9/5/2023 2000 by Vesna Pal RN  Supportive Measures:   active listening utilized   verbalization of feelings encouraged   relaxation techniques promoted  Spiritual Activities Assistance: hope instilled  Family/Support System Care:   support provided   presence promoted   self-care encouraged   involvement promoted   Goal  Outcome Evaluation:

## 2023-09-06 NOTE — PLAN OF CARE
Goal Outcome Evaluation:  Plan of Care Reviewed With: patient, spouse        Progress: improving  Outcome Evaluation: Pt was found supine in bed with conrado wrist restraints. She is confused, oriented to self only and following 25-50% of commands with delayed processing. She req's maxAx2 for bed mobility with poor initiation of movement. Initially holding feet off the floor with knees extended and posterior leaning, finally puts feet down and sitting balance improved. She req's total A for LB dressing and toileting with purewick. She stands with minAx2, pt was motivated by  standing in front of her and she is able to take a few steps towards HOB with max cues for sequencing. Unsafe to attempt TF at this time as she is limited by congition, weakness, and dec activity tolerance. Will cont to follow
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
## 2023-09-06 NOTE — PROGRESS NOTES
"DOS: 2023  NAME: Annemarie Ha   : 1965  PCP: Eula Franco APRN  Chief Complaint   Patient presents with    Headache     Worst HA of Life       Chief complaint: encephalopathy  Subjective: encephalopathy persists without improvement. No witnessed seizures. Denies headache    Objective:  Vital signs: /97 (BP Location: Right arm, Patient Position: Lying)   Pulse 97   Temp 98.1 °F (36.7 °C) (Oral)   Resp 18   Ht 166.4 cm (65.5\")   Wt 117 kg (258 lb 8 oz)   SpO2 100%   BMI 42.36 kg/m²    Gen: NAD, vitals reviewed  MS: Awake, inattentive, perseverating  CN: PERRL, conjugate gaze, no facial asymmetry, no dysarthria  Motor: 5/5 upper and lower extremities, no spontaneous movements    Lab Results   Component Value Date    GLUCOSE 116 (H) 2023    CALCIUM 9.5 2023     2023    K 3.8 2023    CO2 19.0 (L) 2023     2023    BUN 11 2023    CREATININE 0.95 2023    EGFRIFAFRI 92 2016    EGFRIFNONA 77 2017    BCR 11.6 2023    ANIONGAP 17.0 (H) 2023     Lab Results   Component Value Date    WBC 7.70 2023    HGB 12.9 2023    HCT 37.4 2023    MCV 88.2 2023     2023       Laboratory results: Anion gap 17, CBC normal    Imaging results: Previous MRI without contrast and CTV were negative for any acute findings    Diagnoses:  Encephalopathy  New onset headache, resolved  Reported history of relapsing remitting multiple sclerosis    Comment: Persistent severe encephalopathy, etiology unclear.  If she fails to improve tomorrow with Dr. Ortiz's addition of scheduled Haldol we will pursue CSF testing  "

## 2023-09-06 NOTE — PROGRESS NOTES
"    Name: Annemarie Ha ADMIT: 2023   : 1965  PCP: Eula Franco APRN    MRN: [id number] LOS: 1 days   AGE/SEX: 58 y.o. female  ROOM: [facility]     Subjective   Subjective   In bed when seen,  present. He reports she didn't sleep much overnight, didn't eat breakfast. Required haldol overnight without improvement, he feels it made her more agitated. Tolerating conrado wrist restraints. Calm on exam, smiling       Objective   Objective   Vital Signs  Temp:  [97.5 °F (36.4 °C)-98.6 °F (37 °C)] 97.5 °F (36.4 °C)  Heart Rate:  [75-86] 86  Resp:  [18] 18  BP: (124-151)/(72-90) 141/81  SpO2:  [97 %-100 %] 97 %  on   ;   Device (Oxygen Therapy): room air  Body mass index is 42.36 kg/m².  Physical Exam  Vitals and nursing note reviewed.   Constitutional:       General: She is not in acute distress.     Appearance: She is obese. She is ill-appearing. She is not toxic-appearing.   HENT:      Head: Normocephalic.      Mouth/Throat:      Mouth: Mucous membranes are moist.   Eyes:      Conjunctiva/sclera: Conjunctivae normal.   Cardiovascular:      Rate and Rhythm: Normal rate and regular rhythm.   Pulmonary:      Effort: Pulmonary effort is normal. No respiratory distress.      Breath sounds: No wheezing or rales.   Abdominal:      General: Bowel sounds are normal.      Palpations: Abdomen is soft.   Musculoskeletal:      Cervical back: Neck supple.      Right lower leg: No edema.      Left lower leg: No edema.   Skin:     General: Skin is warm and dry.   Neurological:      Mental Status: She is alert.      Comments: Purposeful movement of extremities  Able to state her first name, recognizes   When asked if she knew where she was, starts counting \"7,8,9\"     Psychiatric:         Behavior: Behavior is slowed. Behavior is not agitated.      Comments: Mild restlessness     Results Review     I reviewed the patient's new clinical results.  Results from last 7 days   Lab Units 23  4033 " 09/05/23  0552 09/04/23  0546 09/03/23  0515   WBC 10*3/mm3 7.70 5.46 5.52 5.61   HEMOGLOBIN g/dL 12.9 12.3 11.6* 11.9*   PLATELETS 10*3/mm3 320 229 209 189     Results from last 7 days   Lab Units 09/06/23  0504 09/05/23  0551 09/04/23  2121 09/04/23  0546 09/03/23  0515   SODIUM mmol/L 136 134*  --  134* 136   POTASSIUM mmol/L 3.8 4.7 4.1 3.4* 3.8   CHLORIDE mmol/L 100 101  --  98 102   CO2 mmol/L 19.0* 18.6*  --  24.0 24.4   BUN mg/dL 11 9  --  7* 13   CREATININE mg/dL 0.95 0.85  --  0.89 0.89   GLUCOSE mg/dL 116* 95  --  99 94   EGFR mL/min/1.73 68.7 78.5  --  74.3 74.3     Results from last 7 days   Lab Units 09/02/23  1000   ALBUMIN g/dL 4.4   BILIRUBIN mg/dL 0.9   ALK PHOS U/L 70   AST (SGOT) U/L 9   ALT (SGPT) U/L 12     Results from last 7 days   Lab Units 09/06/23  0504 09/05/23  0551 09/04/23  0546 09/03/23  0515 09/02/23  1645 09/02/23  1000   CALCIUM mg/dL 9.5 9.6 9.2 9.1  --  9.2   ALBUMIN g/dL  --   --   --   --   --  4.4   MAGNESIUM mg/dL  --   --   --   --  2.2  --      Results from last 7 days   Lab Units 09/02/23  1000   PROCALCITONIN ng/mL 0.04   LACTATE mmol/L 1.0     No results found for: HGBA1C, POCGLU    No radiology results for the last day    I have personally reviewed all medications:  Scheduled Medications  aspirin, 81 mg, Oral, Daily  cloNIDine, 0.1 mg, Oral, Nightly  enoxaparin, 40 mg, Subcutaneous, Q12H  famotidine, 40 mg, Oral, Daily  HYDROcodone-acetaminophen, 1 tablet, Oral, BID AC  lamoTRIgine, 200 mg, Oral, BID  magnesium oxide, 400 mg, Oral, Daily  potassium chloride, 10 mEq, Oral, Daily  senna-docusate sodium, 2 tablet, Oral, BID  sodium chloride, 10 mL, Intravenous, Q12H  topiramate, 50 mg, Oral, Nightly  vitamin B-12, 1,000 mcg, Oral, Daily  Vitamin B-2, 400 mg, Oral, Daily  vitamin D, 50,000 Units, Oral, Weekly    Infusions   Diet  Diet: Regular/House Diet; Texture: Regular Texture (IDDSI 7); Fluid Consistency: Thin (IDDSI 0)    I have personally reviewed:  [x]  Laboratory    [x]  Microbiology   [x]  Radiology   [x]  EKG/Telemetry  [x]  Cardiology/Vascular   []  Pathology    []  Records       Assessment/Plan     Active Hospital Problems    Diagnosis  POA    **Migraine [G43.909]  Yes    Tetrahydrocannabinol (THC) use disorder, mild, abuse [F12.10]  Yes    Glucose intolerance [E74.39]  Yes    Chronic pain syndrome [G89.4]  Yes    Opioid dependence [F11.20]  Yes    Mood disorder [F39]  Yes    Left-sided headache [R51.9]  Yes    Obstructive sleep apnea [G47.33]  Yes    Dyslipidemia [E78.5]  Yes      Resolved Hospital Problems    Diagnosis Date Resolved POA    Hypokalemia [E87.6] 09/06/2023 Yes       58 y.o. female admitted with Migraine.    Left-sided headache in a patient with a history of MS/seizure activity.  Mostly migraine.  Improved on Topamax/migraine medication.  CNS examination is negative.  CT scan of the brain without contrast is negative.  Negative CTA of the brain except for surgical changes of the right orbit floor.  Negative MRI of the brain.  Positive family history of migraine.  No seizure activity no clinical evidence of temporal arteritis with normal ESR and only mildly elevated CRP.  Neurology following and adjusting medications. Delirium precautions  PTSD/personality disorder/mood disorder.  Patient with periods of confusion and possible confabulation.  Psychiatry following. Haldol prn ordered, received overnight without improvement per .  Continues to require placement of soft wrist restraints  THC use.   Hypokalemia.  Normal magnesium.  K wnl on today's labs  Hyperglycemia.  History of glucose intolerance.  A1c is normal at 5.  DDD/generalized osteoarthritis/chronic pain syndrome/opioid dependence.  Continue the patient on her hydrocodone. Stop dilaudid as pt has not been taking  Obstructive sleep apnea.  Continue CPAP.  Dyslipidemia.  Continue Lipitor.    Working with PT      Lovenox  for DVT prophylaxis.  Full code.  Discussed with patient and  spouse.  Anticipate discharge  TBD  .      SHERMAN Thorne  [facility]  09/06/23  11:37 EDT

## 2023-09-07 ENCOUNTER — APPOINTMENT (OUTPATIENT)
Dept: GENERAL RADIOLOGY | Facility: HOSPITAL | Age: 58
DRG: 098 | End: 2023-09-07
Payer: MEDICARE

## 2023-09-07 LAB
ANION GAP SERPL CALCULATED.3IONS-SCNC: 14.2 MMOL/L (ref 5–15)
APPEARANCE CSF: ABNORMAL
APPEARANCE CSF: CLEAR
BACTERIA SPEC AEROBE CULT: NORMAL
BACTERIA SPEC AEROBE CULT: NORMAL
BASOPHILS # BLD AUTO: 0.02 10*3/MM3 (ref 0–0.2)
BASOPHILS NFR BLD AUTO: 0.3 % (ref 0–1.5)
BUN SERPL-MCNC: 10 MG/DL (ref 8–23)
BUN/CREAT SERPL: 10.2 (ref 7–25)
C GATTII+NEOFOR DNA CSF QL NAA+NON-PROBE: NOT DETECTED
CALCIUM SPEC-SCNC: 9.9 MG/DL (ref 8.6–10.5)
CHLORIDE SERPL-SCNC: 101 MMOL/L (ref 98–107)
CMV DNA CSF QL NAA+PROBE: NOT DETECTED
CO2 SERPL-SCNC: 22.8 MMOL/L (ref 22–29)
COLOR CSF: COLORLESS
COLOR CSF: COLORLESS
CREAT SERPL-MCNC: 0.98 MG/DL (ref 0.57–1)
DEPRECATED RDW RBC AUTO: 41.5 FL (ref 37–54)
E COLI K1 DNA CSF QL NAA+NON-PROBE: NOT DETECTED
EGFRCR SERPLBLD CKD-EPI 2021: 66.2 ML/MIN/1.73
EOSINOPHIL # BLD AUTO: 0.09 10*3/MM3 (ref 0–0.4)
EOSINOPHIL NFR BLD AUTO: 1.5 % (ref 0.3–6.2)
ERYTHROCYTE [DISTWIDTH] IN BLOOD BY AUTOMATED COUNT: 12.7 % (ref 12.3–15.4)
EV RNA CSF QL NAA+PROBE: NOT DETECTED
GLUCOSE CSF-MCNC: 61 MG/DL (ref 40–70)
GLUCOSE SERPL-MCNC: 104 MG/DL (ref 65–99)
GP B STREP DNA SPEC QL NAA+PROBE: NOT DETECTED
HAEM INFLU SEROTYP DNA SPEC NAA+PROBE: NOT DETECTED
HCT VFR BLD AUTO: 37.8 % (ref 34–46.6)
HGB BLD-MCNC: 13.1 G/DL (ref 12–15.9)
HHV6 DNA CSF QL NAA+PROBE: NOT DETECTED
HSV1 DNA CSF QL NAA+PROBE: NOT DETECTED
HSV2 DNA CSF QL NAA+PROBE: NOT DETECTED
IMM GRANULOCYTES # BLD AUTO: 0.05 10*3/MM3 (ref 0–0.05)
IMM GRANULOCYTES NFR BLD AUTO: 0.8 % (ref 0–0.5)
L MONOCYTOG RRNA SPEC QL PROBE: NOT DETECTED
LYMPHOCYTES # BLD AUTO: 0.8 10*3/MM3 (ref 0.7–3.1)
LYMPHOCYTES NFR BLD AUTO: 13.2 % (ref 19.6–45.3)
LYMPHOCYTES NFR CSF MANUAL: 98 %
LYMPHOCYTES NFR CSF MANUAL: 98 %
MCH RBC QN AUTO: 30.8 PG (ref 26.6–33)
MCHC RBC AUTO-ENTMCNC: 34.7 G/DL (ref 31.5–35.7)
MCV RBC AUTO: 88.7 FL (ref 79–97)
METHOD: ABNORMAL
METHOD: ABNORMAL
MONOCYTES # BLD AUTO: 0.65 10*3/MM3 (ref 0.1–0.9)
MONOCYTES NFR BLD AUTO: 10.7 % (ref 5–12)
MONOCYTES NFR CSF MANUAL: 2 %
MONOCYTES NFR CSF MANUAL: 2 %
N MEN DNA SPEC QL NAA+PROBE: NOT DETECTED
NEUTROPHILS NFR BLD AUTO: 4.46 10*3/MM3 (ref 1.7–7)
NEUTROPHILS NFR BLD AUTO: 73.5 % (ref 42.7–76)
NRBC BLD AUTO-RTO: 0 /100 WBC (ref 0–0.2)
NUC CELL # CSF MANUAL: 133 /MM3 (ref 0–5)
NUC CELL # CSF MANUAL: 167 /MM3 (ref 0–5)
PARECHOVIRUS A RNA CSF QL NAA+NON-PROBE: NOT DETECTED
PLATELET # BLD AUTO: 352 10*3/MM3 (ref 140–450)
PMV BLD AUTO: 10 FL (ref 6–12)
POTASSIUM SERPL-SCNC: 4.5 MMOL/L (ref 3.5–5.2)
PROT CSF-MCNC: 91.9 MG/DL (ref 15–45)
RBC # BLD AUTO: 4.26 10*6/MM3 (ref 3.77–5.28)
RBC # CSF MANUAL: 219 /MM3 (ref 0–0)
RBC # CSF MANUAL: 5 /MM3 (ref 0–0)
S PNEUM DNA CSF QL NAA+NON-PROBE: NOT DETECTED
SODIUM SERPL-SCNC: 138 MMOL/L (ref 136–145)
TUBE # CSF: 1
TUBE # CSF: 4
VZV DNA CSF QL NAA+PROBE: NOT DETECTED
WBC NRBC COR # BLD: 6.07 10*3/MM3 (ref 3.4–10.8)

## 2023-09-07 PROCEDURE — 86255 FLUORESCENT ANTIBODY SCREEN: CPT | Performed by: PSYCHIATRY & NEUROLOGY

## 2023-09-07 PROCEDURE — 87070 CULTURE OTHR SPECIMN AEROBIC: CPT | Performed by: PSYCHIATRY & NEUROLOGY

## 2023-09-07 PROCEDURE — 87102 FUNGUS ISOLATION CULTURE: CPT | Performed by: INTERNAL MEDICINE

## 2023-09-07 PROCEDURE — 25010000002 DIPHENHYDRAMINE PER 50 MG: Performed by: SPECIALIST

## 2023-09-07 PROCEDURE — 80048 BASIC METABOLIC PNL TOTAL CA: CPT | Performed by: INTERNAL MEDICINE

## 2023-09-07 PROCEDURE — 85025 COMPLETE CBC W/AUTO DIFF WBC: CPT | Performed by: INTERNAL MEDICINE

## 2023-09-07 PROCEDURE — 84157 ASSAY OF PROTEIN OTHER: CPT | Performed by: PSYCHIATRY & NEUROLOGY

## 2023-09-07 PROCEDURE — 82945 GLUCOSE OTHER FLUID: CPT | Performed by: PSYCHIATRY & NEUROLOGY

## 2023-09-07 PROCEDURE — 25010000002 ENOXAPARIN PER 10 MG: Performed by: INTERNAL MEDICINE

## 2023-09-07 PROCEDURE — 86341 ISLET CELL ANTIBODY: CPT | Performed by: PSYCHIATRY & NEUROLOGY

## 2023-09-07 PROCEDURE — 89051 BODY FLUID CELL COUNT: CPT | Performed by: PSYCHIATRY & NEUROLOGY

## 2023-09-07 PROCEDURE — 87116 MYCOBACTERIA CULTURE: CPT | Performed by: INTERNAL MEDICINE

## 2023-09-07 PROCEDURE — 87206 SMEAR FLUORESCENT/ACID STAI: CPT | Performed by: INTERNAL MEDICINE

## 2023-09-07 PROCEDURE — 87205 SMEAR GRAM STAIN: CPT | Performed by: PSYCHIATRY & NEUROLOGY

## 2023-09-07 PROCEDURE — 0 LIDOCAINE 1 % SOLUTION: Performed by: RADIOLOGY

## 2023-09-07 PROCEDURE — 87483 CNS DNA AMP PROBE TYPE 12-25: CPT | Performed by: PSYCHIATRY & NEUROLOGY

## 2023-09-07 PROCEDURE — 99232 SBSQ HOSP IP/OBS MODERATE 35: CPT

## 2023-09-07 PROCEDURE — 25010000002 ZIPRASIDONE MESYLATE PER 10 MG: Performed by: SPECIALIST

## 2023-09-07 PROCEDURE — 87015 SPECIMEN INFECT AGNT CONCNTJ: CPT | Performed by: PSYCHIATRY & NEUROLOGY

## 2023-09-07 PROCEDURE — 009U3ZX DRAINAGE OF SPINAL CANAL, PERCUTANEOUS APPROACH, DIAGNOSTIC: ICD-10-PCS | Performed by: RADIOLOGY

## 2023-09-07 PROCEDURE — 88108 CYTOPATH CONCENTRATE TECH: CPT | Performed by: PSYCHIATRY & NEUROLOGY

## 2023-09-07 RX ORDER — DIPHENHYDRAMINE HYDROCHLORIDE 50 MG/ML
25 INJECTION INTRAMUSCULAR; INTRAVENOUS EVERY 6 HOURS PRN
Status: DISCONTINUED | OUTPATIENT
Start: 2023-09-07 | End: 2023-09-12 | Stop reason: HOSPADM

## 2023-09-07 RX ORDER — ZIPRASIDONE MESYLATE 20 MG/ML
20 INJECTION, POWDER, LYOPHILIZED, FOR SOLUTION INTRAMUSCULAR EVERY 4 HOURS PRN
Status: DISCONTINUED | OUTPATIENT
Start: 2023-09-07 | End: 2023-09-11

## 2023-09-07 RX ORDER — HYDROCODONE BITARTRATE AND ACETAMINOPHEN 5; 325 MG/1; MG/1
1 TABLET ORAL
Status: DISCONTINUED | OUTPATIENT
Start: 2023-09-07 | End: 2023-09-08

## 2023-09-07 RX ORDER — SODIUM CHLORIDE 9 MG/ML
100 INJECTION, SOLUTION INTRAVENOUS CONTINUOUS
Status: DISCONTINUED | OUTPATIENT
Start: 2023-09-07 | End: 2023-09-09

## 2023-09-07 RX ORDER — LIDOCAINE HYDROCHLORIDE 10 MG/ML
10 INJECTION, SOLUTION INFILTRATION; PERINEURAL ONCE
Status: COMPLETED | OUTPATIENT
Start: 2023-09-07 | End: 2023-09-07

## 2023-09-07 RX ADMIN — ENOXAPARIN SODIUM 40 MG: 100 INJECTION SUBCUTANEOUS at 09:30

## 2023-09-07 RX ADMIN — CLONIDINE HYDROCHLORIDE 0.1 MG: 0.1 TABLET ORAL at 21:23

## 2023-09-07 RX ADMIN — SENNOSIDES AND DOCUSATE SODIUM 2 TABLET: 50; 8.6 TABLET ORAL at 21:24

## 2023-09-07 RX ADMIN — LAMOTRIGINE 200 MG: 100 TABLET ORAL at 21:23

## 2023-09-07 RX ADMIN — Medication 10 ML: at 08:28

## 2023-09-07 RX ADMIN — TOPIRAMATE 50 MG: 50 TABLET, FILM COATED ORAL at 21:26

## 2023-09-07 RX ADMIN — DIPHENHYDRAMINE HYDROCHLORIDE 25 MG: 50 INJECTION, SOLUTION INTRAMUSCULAR; INTRAVENOUS at 14:07

## 2023-09-07 RX ADMIN — HALOPERIDOL 1 MG: 1 TABLET ORAL at 21:23

## 2023-09-07 RX ADMIN — SODIUM CHLORIDE 100 ML/HR: 9 INJECTION, SOLUTION INTRAVENOUS at 11:48

## 2023-09-07 RX ADMIN — ZIPRASIDONE MESYLATE 20 MG: 20 INJECTION, POWDER, LYOPHILIZED, FOR SOLUTION INTRAMUSCULAR at 14:06

## 2023-09-07 RX ADMIN — ENOXAPARIN SODIUM 40 MG: 100 INJECTION SUBCUTANEOUS at 21:23

## 2023-09-07 RX ADMIN — Medication 10 ML: at 21:26

## 2023-09-07 RX ADMIN — LIDOCAINE HYDROCHLORIDE 5 ML: 10 INJECTION, SOLUTION INFILTRATION; PERINEURAL at 14:55

## 2023-09-07 NOTE — PLAN OF CARE
Goal Outcome Evaluation:           Progress: no change  Outcome Evaluation: vss, pt still confused, restless and agitated, refusing medication and food, started on ivf, completed LP today, wrist restraints intact, will continue to monitor

## 2023-09-07 NOTE — SIGNIFICANT NOTE
09/07/23 0958   OTHER   Discipline physical therapy assistant   Rehab Time/Intention   Session Not Performed other (see comments)  (pt not appropriate for PT today per RN d/t pt's increased confusion; PT will f/u tomorrow)   Recommendation   PT - Next Appointment 09/08/23

## 2023-09-07 NOTE — PROGRESS NOTES
" LOS: 2 days     Name: Annemarie Ha  Age: 58 y.o.  Sex: female  :  1965  MRN: [id number]         Primary Care Physician: Eula Franco APRN    Subjective   Subjective  Patient remains delirious/confused.  Still requiring soft wrist restraints.  Spit out her medications this morning.  Begging her  at the bedside to take her home.    Objective   Vital Signs  Temp:  [98.1 °F (36.7 °C)-99.1 °F (37.3 °C)] 98.6 °F (37 °C)  Heart Rate:  [] 98  Resp:  [18] 18  BP: (130-170)/(73-97) 140/94  Body mass index is 42.36 kg/m².    Objective:  General Appearance:  Comfortable, in no acute distress and ill-appearing.    Vital signs: (most recent): Blood pressure 140/94, pulse 98, temperature 98.6 °F (37 °C), temperature source Oral, resp. rate 18, height 166.4 cm (65.5\"), weight 117 kg (258 lb 8 oz), SpO2 95 %.    Lungs:  Normal effort and normal respiratory rate.  There are decreased breath sounds.    Heart: Normal rate.  Regular rhythm.    Abdomen: Abdomen is soft.  Bowel sounds are normal.   There is no abdominal tenderness.     Extremities: There is no dependent edema or local swelling.    Neurological: Patient is alert.  (Awake and reasonably alert.  Appears delirious).    Skin:  Warm and dry.              Results Review:       I reviewed the patient's new clinical results.    Results from last 7 days   Lab Units 23  0642 23  0504 23  0552 23  0546 23  0515 23  1000   WBC 10*3/mm3 6.07 7.70 5.46 5.52 5.61 7.92   HEMOGLOBIN g/dL 13.1 12.9 12.3 11.6* 11.9* 12.3   PLATELETS 10*3/mm3 352 320 229 209 189 186     Results from last 7 days   Lab Units 23  0642 23  0504 23  0551 23  2121 23  0546 23  0515 23  0023 23  1000   SODIUM mmol/L 138 136 134*  --  134* 136  --  135*   POTASSIUM mmol/L 4.5 3.8 4.7 4.1 3.4* 3.8 4.0 3.3*   CHLORIDE mmol/L 101 100 101  --  98 102  --  99   CO2 mmol/L 22.8 19.0* 18.6*  --  24.0 24.4 "  --  23.5   BUN mg/dL 10 11 9  --  7* 13  --  13   CREATININE mg/dL 0.98 0.95 0.85  --  0.89 0.89  --  0.84   CALCIUM mg/dL 9.9 9.5 9.6  --  9.2 9.1  --  9.2   GLUCOSE mg/dL 104* 116* 95  --  99 94  --  116*                 Scheduled Meds:   aspirin, 81 mg, Oral, Daily  cloNIDine, 0.1 mg, Oral, Nightly  enoxaparin, 40 mg, Subcutaneous, Q12H  famotidine, 40 mg, Oral, Daily  haloperidol, 1 mg, Oral, TID  HYDROcodone-acetaminophen, 1 tablet, Oral, BID AC  lamoTRIgine, 200 mg, Oral, BID  magnesium oxide, 400 mg, Oral, Daily  potassium chloride, 10 mEq, Oral, Daily  senna-docusate sodium, 2 tablet, Oral, BID  sodium chloride, 10 mL, Intravenous, Q12H  topiramate, 50 mg, Oral, Nightly  vitamin B-12, 1,000 mcg, Oral, Daily  Vitamin B-2, 400 mg, Oral, Daily  vitamin D, 50,000 Units, Oral, Weekly      PRN Meds:     acetaminophen **OR** acetaminophen **OR** acetaminophen    albuterol sulfate HFA    senna-docusate sodium **AND** polyethylene glycol **AND** bisacodyl **AND** bisacodyl    Calcium Replacement - Follow Nurse / BPA Driven Protocol    haloperidol lactate    loperamide    Magnesium Standard Dose Replacement - Follow Nurse / BPA Driven Protocol    nitroglycerin    ondansetron **OR** ondansetron    Phosphorus Replacement - Follow Nurse / BPA Driven Protocol    Potassium Replacement - Follow Nurse / BPA Driven Protocol    [COMPLETED] Insert Peripheral IV **AND** sodium chloride    sodium chloride    sodium chloride  Continuous Infusions:       Assessment & Plan   Active Hospital Problems    Diagnosis  POA    **Migraine [G43.909]  Yes    Tetrahydrocannabinol (THC) use disorder, mild, abuse [F12.10]  Yes    Glucose intolerance [E74.39]  Yes    Chronic pain syndrome [G89.4]  Yes    Opioid dependence [F11.20]  Yes    Mood disorder [F39]  Yes    Left-sided headache [R51.9]  Yes    Obstructive sleep apnea [G47.33]  Yes    Dyslipidemia [E78.5]  Yes      Resolved Hospital Problems    Diagnosis Date Resolved POA    Hypokalemia  [E87.6] 09/06/2023 Yes       Assessment & Plan    Left-sided headache in a patient with a history of MS/seizure activity.  Mostly migraine.  Improved on Topamax/migraine medication.  CNS examination is negative.  CT scan of the brain without contrast is negative.  Negative CTA of the brain except for surgical changes of the right orbit floor.  Negative MRI of the brain.  Positive family history of migraine.  No seizure activity no clinical evidence of temporal arteritis with normal ESR and only mildly elevated CRP.  Neurology following and adjusting medications. Delirium precautions  Encephalopathy/PTSD/personality disorder/mood disorder.  She has had a persistent encephalopathy over the last 3 days requiring soft wrist restraints.  No significant improvement with addition of Haldol.  Neurology considering CSF testing.  THC use.   Hypokalemia.  Normal magnesium.  K wnl on today's labs  Hyperglycemia.  History of glucose intolerance.  A1c is normal at 5.  DDD/generalized osteoarthritis/chronic pain syndrome/opioid dependence.  Discussed with her  at the bedside today.  She has been on hydrocodone for a very long time and was taking on a scheduled basis at home.  Had been able to wean herself down to 2-1/2 tablets of 7.5 mg daily.  He would like for this to be reduced further.  Will decrease dose to 5 mg twice daily.  Obstructive sleep apnea.  Continue CPAP.  Dyslipidemia.  Continue Lipitor.  Will add IV fluids today given her lack of oral intake over the past several days.  If condition persists we will have to consider enteral feeding as well.        Lovenox  for DVT prophylaxis.  Full code.  Discussed with patient and spouse.  Anticipate discharge  TBD  .      Expected Discharge Date: 9/8/2023; Expected Discharge Time:      Joselo German MD  [facility]  09/07/23  08:54 EDT

## 2023-09-07 NOTE — PROGRESS NOTES
The patient remains encephalopathic with wrist restraints still in place.  She is oriented to person only and remains thoroughly confused exhibiting clouding of consciousness, refusing medications, etc.  She is scheduled for an LP later today.  In order to facilitate her remaining calm during this procedure I have ordered a one-time dose of Geodon intramuscularly as well as Benadryl to be given simultaneously.

## 2023-09-07 NOTE — PROGRESS NOTES
"DOS: 2023  NAME: Annemarie Ha   : 1965  PCP: Eula Franco APRN  Chief Complaint   Patient presents with    Headache     Worst HA of Life     Neurology    Subjective:     Interval History  Pt seen in follow up today.  Remains in bilateral soft wrist restraints.  She denies headache or dizziness.  Is able to tell me who she is but unable to tell me place or time.  LP has been ordered.  No family at bedside.  History taken from: patient chart RN    Part of the note was copied and pasted, but thoroughly reviewed for any corrections.    Objective:  Vital signs: /92 (BP Location: Right arm, Patient Position: Lying)   Pulse 97   Temp 98.1 °F (36.7 °C) (Oral)   Resp 18   Ht 166.4 cm (65.5\")   Wt 117 kg (258 lb 8 oz)   SpO2 96%   BMI 42.36 kg/m²       Physical Exam:  GENERAL: NAD, alert and cooperative  HEENT: Normocephalic, atraumatic   Resp: Even and unlabored, no audible wheezes  Skin: Warm and dry, no rashes or birth marks.   Psychiatric: Normal mood and affect.     Neurological:   MS: Awake, perseverating, oriented to self only, recent/remote memory impaired, decreased attention/concentration, language intact, no neglect   CN: Blinks to threat, does not track with eyes on command, PERRL, EOMI, no nystagmus, no facial droop, no dysarthria, tongue midline  Motor: 5/5 strength in all 4 ext. Normal tone.  No tremor, no overshoot or asterixis.   Sensory: Intact to light touch in all four ext.  Gait and station: Deferred    Results Review:    I reviewed the patient's new clinical results.  I reviewed the patient's other test results and agree with the interpretation  Discussed with Dr. Leavitt    Current Medications:  Scheduled Medications:aspirin, 81 mg, Oral, Daily  cloNIDine, 0.1 mg, Oral, Nightly  enoxaparin, 40 mg, Subcutaneous, Q12H  famotidine, 40 mg, Oral, Daily  haloperidol, 1 mg, Oral, TID  HYDROcodone-acetaminophen, 1 tablet, Oral, BID AC  lamoTRIgine, 200 mg, Oral, " BID  magnesium oxide, 400 mg, Oral, Daily  potassium chloride, 10 mEq, Oral, Daily  senna-docusate sodium, 2 tablet, Oral, BID  sodium chloride, 10 mL, Intravenous, Q12H  topiramate, 50 mg, Oral, Nightly  vitamin B-12, 1,000 mcg, Oral, Daily  Vitamin B-2, 400 mg, Oral, Daily  vitamin D, 50,000 Units, Oral, Weekly      Infusions: hold, 1 each  sodium chloride, 100 mL/hr, Last Rate: 100 mL/hr (09/07/23 1148)      PRN Medications:    acetaminophen **OR** acetaminophen **OR** acetaminophen    albuterol sulfate HFA    senna-docusate sodium **AND** polyethylene glycol **AND** bisacodyl **AND** bisacodyl    Calcium Replacement - Follow Nurse / BPA Driven Protocol    diphenhydrAMINE    haloperidol lactate    hold    loperamide    Magnesium Standard Dose Replacement - Follow Nurse / BPA Driven Protocol    nitroglycerin    ondansetron **OR** ondansetron    Phosphorus Replacement - Follow Nurse / BPA Driven Protocol    Potassium Replacement - Follow Nurse / BPA Driven Protocol    [COMPLETED] Insert Peripheral IV **AND** sodium chloride    sodium chloride    sodium chloride    ziprasidone    Laboratory results:  Lab Results   Component Value Date    GLUCOSE 104 (H) 09/07/2023    CALCIUM 9.9 09/07/2023     09/07/2023    K 4.5 09/07/2023    CO2 22.8 09/07/2023     09/07/2023    BUN 10 09/07/2023    CREATININE 0.98 09/07/2023    EGFRIFAFRI 92 02/08/2016    EGFRIFNONA 77 11/29/2017    BCR 10.2 09/07/2023    ANIONGAP 14.2 09/07/2023     Lab Results   Component Value Date    WBC 6.07 09/07/2023    HGB 13.1 09/07/2023    HCT 37.8 09/07/2023    MCV 88.7 09/07/2023     09/07/2023          No results found for: INR, PROTIME  Lab Results   Component Value Date    TSH 0.853 09/02/2023     No components found for: LDLCALC  Lab Results   Component Value Date    HGBA1C 5.00 09/02/2023     No components found for: B12    Review and interpretation of imaging:   CT Head Without Contrast    Result Date: 9/2/2023  HEAD CT   HISTORY: Left side headache.  TECHNIQUE: Noncontrast head CT is provided. Comparison: None.  FINDINGS: Screw-plate hardware along the inferior right orbital rim from old fracture repair. Paranasal sinuses are clear. 0The ventricles are normal in caliber. The brain parenchyma and extra-axial spaces appear normal. There is no hemorrhage or acute ischemic change. The bones of the skull appear normal. The mastoid air cells, middle ears, and visualized paranasal sinuses are clear.      Negative head CT.  Radiation dose reduction techniques were utilized, including automated exposure control and exposure modulation based on body size.   This report was finalized on 9/2/2023 11:08 AM by Dr. Loco Barlow M.D.      MRI Brain Without Contrast    Result Date: 9/3/2023  BRAIN MRI WITHOUT CONTRAST  HISTORY: Severe headache left-sided; R51.9-Headache, unspecified; G35-Multiple sclerosis  COMPARISON: September 2, 2023.  FINDINGS:  Multiplanar images of the head were obtained without gadolinium. No areas of restricted diffusion are seen to suggest acute infarct. Ventricles are normal in size. There is no midline shift or mass effect. There is some mild periventricular microangiopathic change. No abnormality is seen on gradient echo imaging. There is artifact overlying the right orbit, related to prior plate and screw fixation of the anterior wall of the right maxillary sinus. No T2 weighted imaging or postcontrast imaging was obtained, as the patient could not further tolerate the examination.      1. No acute intracranial abnormality. Please note, T2 and postcontrast imaging was not obtained, as the patient could not tolerate any further imaging.   This report was finalized on 9/3/2023 5:13 AM by Dr. Kelsie Chowdhury M.D.      CT Angiogram Head    Result Date: 9/3/2023  CONTRAST-ENHANCED CT ANGIOGRAM OF THE HEAD 09/03/2023  CLINICAL HISTORY: Severe headache.  TECHNIQUE: Spiral CT images were obtained from the base of the skull  to the vertex both pre-intravenous and postintravenous contrast and images were reformatted and submitted in 3 mm thick axial, sagittal and coronal CT sections with brain algorithm. Additional spiral CT angiographic images were obtained from the C1-2 cervical level up through the vertex of the skull during the arterial phase of contrast and the images were reformatted and submitted in 1 mm thick axial, sagittal and coronal CT sections with soft tissue algorithm and 3D reconstructions were performed to complete the CT angiogram of the head.  This is correlated to a CT of the head without contrast yesterday 09/02/2023 at 10:30 a.m. and an MRI of the brain without contrast earlier this morning 09/03/2023 at 1:30 a.m.  FINDINGS:  HEAD CT: The brain parenchyma is normal in attenuation. The ventricles are normal in size. I see no focal mass effect. There is no midline shift. No extra-axial fluid collections are identified. There is no evidence of acute intra-cranial hemorrhage. Following contrast no abnormal areas of enhancement are seen in the head. The calvarium and the skull base are normal in appearance. There is an anterior plate over the anterior wall of the right maxillary sinus. The paranasal sinuses, the mastoid air cells and middle ear cavities are clear.  CT ANGIOGRAM OF THE HEAD:  The visualized portions of the distal vertebral arteries are normal in appearance from the C1-2 cervical level to the vertebrobasilar junction. 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 This finding is best demonstrated on coronal thin cut CTA images 119-123. The basilar artery and the basilar tip is normal in appearance. The posterior cerebral and superior cerebellar arteries are within normal limits. The upper cervical, petrous, cavernous and supracavernous segments of the internal carotid arteries are within normal limits. The  left posterior communicating artery origin is within normal limits.  The A1 segments of the anterior cerebral arteries and the anterior communicating artery origin and A2 segments of the anterior cerebral arteries are within normal limits. The M1 segments of the middle cerebral arteries and the middle cerebral artery bifurcations are within normal limits.      1. There is an anterior plate projecting over the junction of the anterior wall of the right maxillary sinus and anterior right orbital floor from previous surgery and correlation with surgical history is suggested.  2. 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  Radiation dose reduction techniques were utilized, including automated exposure control and exposure modulation based on body size.   This report was finalized on 9/3/2023 10:13 PM by Dr. Joselo Urbano M.D.      XR Chest 1 Vw    Result Date: 2023  REVIEWING YOUR TEST RESULTS IN [facility] IS NOT A SUBSTITUTE FOR DISCUSSING THOSE RESULTS WITH YOUR HEALTH CARE PROVIDER. PLEASE CONTACT YOUR PROVIDER VIA [facility] TO DISCUSS ANY QUESTIONS OR CONCERNS YOU MAY HAVE REGARDING THESE TEST RESULTS.  RADIOLOGY REPORT FACILITY:  [facility] [place] UNIT/AGE/GENDER: M.ED  ER      AGE:58 Y          SEX:F PATIENT NAME/:  GABRIEL LUGO    1965 UNIT NUMBER:  [id number] ACCOUNT NUMBER:  [id number] ACCESSION NUMBER:  [id number] EXAMINATION: XR CHEST 1 VW DATE: 2023 HISTORY: Fever. COMPARISON: None. FINDINGS: A single view of the chest demonstrates clear lungs. There is no pneumothorax or pleural effusion. The heart size and mediastinal contour are normal. IMPRESSION: No acute cardiopulmonary radiographic abnormality. Dictated by: Loco Hathaway M.D. Images  "and Report reviewed and interpreted by: Loco Hathaway M.D. <PS><Electronically signed by: Loco Hathaway M.D.> 08/29/2023 0618 D: 08/29/2023 0617 T: 08/29/2023 0617     Work-up to date:  CTH wo: Negative head CT  MRI brain wo 9/3: No acute intracranial abnormality.  T2 and postcontrast imaging not obtained as patient cannot tolerate further imaging.  CTA head: No intracranial aneurysm seen.    Lab Review: CMP and CBC unremarkable, B12 > 2000, folate > 20    Diagnoses:  Acute encephalopathy  New onset headache with associated left orbital pain, resolved  History of multiple sclerosis, relapsing remitting type    Impression: Mrs. [place] Ha regionally presented to [facility] 9/2/2023 for left-sided temporal headache and eye pain that she reported as the worst headache of her life.  She reported the headache was ongoing for the past 6 months but increased in intensity and frequency and developed associated blurry vision.  He does follow with Dr. Tien Boateng for multiple sclerosis. This admission she had MR brain imaging with contrast that was unremarkable however due to headache with complaints of left eye pain and MRI brain and orbits with and without contrast was ordered but were unable to be completed due to patient's acute confusion.  On exam she remains confused even with addition of as needed Haldol from psychiatry.  We have ordered an LP with CSF studies to find any infectious or inflammatory cause of her acute encephalopathy.  We will continue to follow with you.  Call RRT for acute neurologic change or concern.    Plan:  Follow-up CSF results from LP  Delirium precautions  Haldol per Psychiatry    I have discussed the above with the patient, PRANAY Toro and Dr. Leavitt who are in agreement with the plan.     Danelle Stewart, APRN  09/07/23  14:33 EDT    \"Dictated utilizing Dragon dictation\".     "

## 2023-09-08 LAB
ANION GAP SERPL CALCULATED.3IONS-SCNC: 15.6 MMOL/L (ref 5–15)
BASOPHILS # BLD AUTO: 0.01 10*3/MM3 (ref 0–0.2)
BASOPHILS NFR BLD AUTO: 0.2 % (ref 0–1.5)
BUN SERPL-MCNC: 9 MG/DL (ref 8–23)
BUN/CREAT SERPL: 10.3 (ref 7–25)
CALCIUM SPEC-SCNC: 9.2 MG/DL (ref 8.6–10.5)
CHLORIDE SERPL-SCNC: 103 MMOL/L (ref 98–107)
CO2 SERPL-SCNC: 20.4 MMOL/L (ref 22–29)
CREAT SERPL-MCNC: 0.87 MG/DL (ref 0.57–1)
CRYPTOC AG CSF QL: NEGATIVE
DEPRECATED RDW RBC AUTO: 41 FL (ref 37–54)
EGFRCR SERPLBLD CKD-EPI 2021: 76.4 ML/MIN/1.73
EOSINOPHIL # BLD AUTO: 0.08 10*3/MM3 (ref 0–0.4)
EOSINOPHIL NFR BLD AUTO: 1.5 % (ref 0.3–6.2)
ERYTHROCYTE [DISTWIDTH] IN BLOOD BY AUTOMATED COUNT: 12.5 % (ref 12.3–15.4)
GLUCOSE SERPL-MCNC: 101 MG/DL (ref 65–99)
HCT VFR BLD AUTO: 38.4 % (ref 34–46.6)
HGB BLD-MCNC: 13.2 G/DL (ref 12–15.9)
HIV 1+2 AB+HIV1 P24 AG SERPL QL IA: NORMAL
IMM GRANULOCYTES # BLD AUTO: 0.06 10*3/MM3 (ref 0–0.05)
IMM GRANULOCYTES NFR BLD AUTO: 1.1 % (ref 0–0.5)
LYMPHOCYTES # BLD AUTO: 0.74 10*3/MM3 (ref 0.7–3.1)
LYMPHOCYTES NFR BLD AUTO: 14 % (ref 19.6–45.3)
MCH RBC QN AUTO: 30.6 PG (ref 26.6–33)
MCHC RBC AUTO-ENTMCNC: 34.4 G/DL (ref 31.5–35.7)
MCV RBC AUTO: 88.9 FL (ref 79–97)
MONOCYTES # BLD AUTO: 0.6 10*3/MM3 (ref 0.1–0.9)
MONOCYTES NFR BLD AUTO: 11.4 % (ref 5–12)
NEUTROPHILS NFR BLD AUTO: 3.78 10*3/MM3 (ref 1.7–7)
NEUTROPHILS NFR BLD AUTO: 71.8 % (ref 42.7–76)
NRBC BLD AUTO-RTO: 0 /100 WBC (ref 0–0.2)
PLATELET # BLD AUTO: 367 10*3/MM3 (ref 140–450)
PMV BLD AUTO: 9.9 FL (ref 6–12)
POTASSIUM SERPL-SCNC: 3.8 MMOL/L (ref 3.5–5.2)
RBC # BLD AUTO: 4.32 10*6/MM3 (ref 3.77–5.28)
RPR SER QL: NORMAL
SODIUM SERPL-SCNC: 139 MMOL/L (ref 136–145)
WBC NRBC COR # BLD: 5.27 10*3/MM3 (ref 3.4–10.8)

## 2023-09-08 PROCEDURE — 25010000002 ENOXAPARIN PER 10 MG: Performed by: INTERNAL MEDICINE

## 2023-09-08 PROCEDURE — 99232 SBSQ HOSP IP/OBS MODERATE 35: CPT

## 2023-09-08 PROCEDURE — 86592 SYPHILIS TEST NON-TREP QUAL: CPT | Performed by: INTERNAL MEDICINE

## 2023-09-08 PROCEDURE — 99223 1ST HOSP IP/OBS HIGH 75: CPT | Performed by: INTERNAL MEDICINE

## 2023-09-08 PROCEDURE — 97530 THERAPEUTIC ACTIVITIES: CPT

## 2023-09-08 PROCEDURE — 86777 TOXOPLASMA ANTIBODY: CPT | Performed by: INTERNAL MEDICINE

## 2023-09-08 PROCEDURE — 85025 COMPLETE CBC W/AUTO DIFF WBC: CPT | Performed by: INTERNAL MEDICINE

## 2023-09-08 PROCEDURE — G0432 EIA HIV-1/HIV-2 SCREEN: HCPCS | Performed by: INTERNAL MEDICINE

## 2023-09-08 PROCEDURE — 80048 BASIC METABOLIC PNL TOTAL CA: CPT | Performed by: INTERNAL MEDICINE

## 2023-09-08 PROCEDURE — 86778 TOXOPLASMA ANTIBODY IGM: CPT | Performed by: INTERNAL MEDICINE

## 2023-09-08 PROCEDURE — 86788 WEST NILE VIRUS AB IGM: CPT | Performed by: INTERNAL MEDICINE

## 2023-09-08 PROCEDURE — 87899 AGENT NOS ASSAY W/OPTIC: CPT | Performed by: INTERNAL MEDICINE

## 2023-09-08 PROCEDURE — 86789 WEST NILE VIRUS ANTIBODY: CPT | Performed by: INTERNAL MEDICINE

## 2023-09-08 PROCEDURE — 97535 SELF CARE MNGMENT TRAINING: CPT

## 2023-09-08 RX ORDER — HYDROCODONE BITARTRATE AND ACETAMINOPHEN 5; 325 MG/1; MG/1
1 TABLET ORAL EVERY 12 HOURS PRN
Status: DISCONTINUED | OUTPATIENT
Start: 2023-09-08 | End: 2023-09-12 | Stop reason: HOSPADM

## 2023-09-08 RX ADMIN — CLONIDINE HYDROCHLORIDE 0.1 MG: 0.1 TABLET ORAL at 20:44

## 2023-09-08 RX ADMIN — HYDROCODONE BITARTRATE AND ACETAMINOPHEN 1 TABLET: 5; 325 TABLET ORAL at 08:09

## 2023-09-08 RX ADMIN — Medication 1000 MCG: at 08:09

## 2023-09-08 RX ADMIN — SENNOSIDES AND DOCUSATE SODIUM 2 TABLET: 50; 8.6 TABLET ORAL at 08:09

## 2023-09-08 RX ADMIN — TOPIRAMATE 50 MG: 50 TABLET, FILM COATED ORAL at 20:44

## 2023-09-08 RX ADMIN — ASPIRIN 81 MG: 81 TABLET, COATED ORAL at 08:09

## 2023-09-08 RX ADMIN — POTASSIUM CHLORIDE 10 MEQ: 750 TABLET, EXTENDED RELEASE ORAL at 08:09

## 2023-09-08 RX ADMIN — HYDROCODONE BITARTRATE AND ACETAMINOPHEN 1 TABLET: 5; 325 TABLET ORAL at 20:44

## 2023-09-08 RX ADMIN — HALOPERIDOL 1 MG: 1 TABLET ORAL at 16:18

## 2023-09-08 RX ADMIN — ENOXAPARIN SODIUM 40 MG: 100 INJECTION SUBCUTANEOUS at 08:09

## 2023-09-08 RX ADMIN — Medication 400 MG: at 08:09

## 2023-09-08 RX ADMIN — LAMOTRIGINE 200 MG: 100 TABLET ORAL at 20:44

## 2023-09-08 RX ADMIN — FAMOTIDINE 40 MG: 20 TABLET, FILM COATED ORAL at 08:09

## 2023-09-08 RX ADMIN — SODIUM CHLORIDE 100 ML/HR: 9 INJECTION, SOLUTION INTRAVENOUS at 01:09

## 2023-09-08 RX ADMIN — ENOXAPARIN SODIUM 40 MG: 100 INJECTION SUBCUTANEOUS at 20:44

## 2023-09-08 RX ADMIN — Medication 10 ML: at 08:13

## 2023-09-08 RX ADMIN — LAMOTRIGINE 200 MG: 100 TABLET ORAL at 08:09

## 2023-09-08 RX ADMIN — Medication 10 ML: at 20:45

## 2023-09-08 RX ADMIN — MAGNESIUM OXIDE 400 MG (241.3 MG MAGNESIUM) TABLET 400 MG: TABLET at 08:09

## 2023-09-08 RX ADMIN — HALOPERIDOL 1 MG: 1 TABLET ORAL at 08:09

## 2023-09-08 RX ADMIN — HALOPERIDOL 1 MG: 1 TABLET ORAL at 20:44

## 2023-09-08 NOTE — PROGRESS NOTES
The the patient remains encephalopathic.  She remains in wrist restraints and continues to exhibit significant delay in thought processing.  She is aware that she is in the hospital.  Continuing to follow.
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
## 2023-09-08 NOTE — PLAN OF CARE
Problem: Restraint, Nonviolent  Goal: Absence of Harm or Injury  Intervention: Implement Least Restrictive Safety Strategies  Recent Flowsheet Documentation  Taken 9/8/2023 0600 by Magalys Lyons RN  Medical Device Protection: IV pole/bag removed from visual field  Less Restrictive Alternative:   calming techniques promoted   bed alarm in use   environment adjusted   emotional support provided   sensory stimulation limited   therapeutic music  De-Escalation Techniques:   diversional activity encouraged   reoriented   quiet time facilitated   stimulation decreased   verbally redirected  Diversional Activities: television  Taken 9/8/2023 0400 by Magalys Lyons RN  Medical Device Protection: IV pole/bag removed from visual field  Less Restrictive Alternative:   calming techniques promoted   coping techniques promoted   emotional support provided   environment adjusted   physical activity promoted   safety enhancements provided   sensory stimulation limited   bed alarm in use  De-Escalation Techniques:   diversional activity encouraged   reoriented   quiet time facilitated  Diversional Activities: television  Taken 9/8/2023 0201 by Magalys Lyons RN  Medical Device Protection: IV pole/bag removed from visual field  Taken 9/8/2023 0200 by Magalys Lyons RN  Medical Device Protection: IV pole/bag removed from visual field  Less Restrictive Alternative:   calming techniques promoted   coping techniques promoted   emotional support provided   environment adjusted   safety enhancements provided   spiritual support provided   sensory stimulation limited   therapeutic music  De-Escalation Techniques:   diversional activity encouraged   physical activity promoted   quiet time facilitated   reoriented   stimulation decreased   time out facilitated   verbally redirected  Diversional Activities: television  Taken 9/8/2023 0009 by Magalys Lyons RN  Medical Device Protection: IV pole/bag removed from visual  field  Taken 9/8/2023 0000 by Magalys Lyons RN  Medical Device Protection: IV pole/bag removed from visual field  Less Restrictive Alternative:   emotional support provided   calming techniques promoted   environment adjusted   sensory stimulation limited   safety enhancements provided   positive reinforcement provided  De-Escalation Techniques:   diversional activity encouraged   increased round frequency   quiet time facilitated   reoriented   stimulation decreased   verbally redirected  Diversional Activities: television  Taken 9/7/2023 2204 by Magalys Lyons RN  Medical Device Protection: IV pole/bag removed from visual field  Taken 9/7/2023 2200 by Magalys Lyons RN  Medical Device Protection: IV pole/bag removed from visual field  Less Restrictive Alternative:   calming techniques promoted   coping techniques promoted   emotional support provided   positive reinforcement provided   problem-solving encouraged   safety enhancements provided   sensory stimulation limited   spiritual support provided  De-Escalation Techniques:   appropriate behavior reinforced   diversional activity encouraged   increased round frequency   medication administered   physical activity promoted   quiet time facilitated   reoriented   stimulation decreased   verbally redirected  Diversional Activities: television  Taken 9/7/2023 2015 by Magalys Lyons RN  Diversional Activities: television  Taken 9/7/2023 2002 by Magalys Lyons RN  Medical Device Protection: IV pole/bag removed from visual field  Taken 9/7/2023 2000 by Magalys Lyons RN  Medical Device Protection: IV pole/bag removed from visual field  Less Restrictive Alternative: calming techniques promoted  De-Escalation Techniques: increased round frequency  Diversional Activities: television  Intervention: Protect Dignity, Rights, and Personal Wellbeing  Recent Flowsheet Documentation  Taken 9/8/2023 0055 by Magalys Lyons RN  Trust Relationship/Rapport:    care explained   choices provided   emotional support provided   questions answered   questions encouraged  Intervention: Protect Skin and Joint Integrity  Recent Flowsheet Documentation  Taken 9/8/2023 0201 by Magalys Lyons RN  Body Position: neutral body alignment  Taken 9/8/2023 0009 by Magalys Lyons RN  Body Position: weight shifting  Taken 9/7/2023 2204 by Magalys Lyons RN  Body Position: tilted  Taken 9/7/2023 2002 by Magalys Lyons RN  Body Position: supine  Goal: Absence of Harm or Injury  Intervention: Implement Least Restrictive Safety Strategies  Recent Flowsheet Documentation  Taken 9/8/2023 0600 by Magalys Lyons RN  Medical Device Protection: IV pole/bag removed from visual field  Less Restrictive Alternative:   calming techniques promoted   bed alarm in use   environment adjusted   emotional support provided   sensory stimulation limited   therapeutic music  De-Escalation Techniques:   diversional activity encouraged   reoriented   quiet time facilitated   stimulation decreased   verbally redirected  Diversional Activities: television  Taken 9/8/2023 0400 by Magalys Lyons RN  Medical Device Protection: IV pole/bag removed from visual field  Less Restrictive Alternative:   calming techniques promoted   coping techniques promoted   emotional support provided   environment adjusted   physical activity promoted   safety enhancements provided   sensory stimulation limited   bed alarm in use  De-Escalation Techniques:   diversional activity encouraged   reoriented   quiet time facilitated  Diversional Activities: television  Taken 9/8/2023 0201 by Magalys Lyons RN  Medical Device Protection: IV pole/bag removed from visual field  Taken 9/8/2023 0200 by Magalys Lyons RN  Medical Device Protection: IV pole/bag removed from visual field  Less Restrictive Alternative:   calming techniques promoted   coping techniques promoted   emotional support provided   environment adjusted    safety enhancements provided   spiritual support provided   sensory stimulation limited   therapeutic music  De-Escalation Techniques:   diversional activity encouraged   physical activity promoted   quiet time facilitated   reoriented   stimulation decreased   time out facilitated   verbally redirected  Diversional Activities: television  Taken 9/8/2023 0009 by Magalys Lyons RN  Medical Device Protection: IV pole/bag removed from visual field  Taken 9/8/2023 0000 by Magalys Lyons RN  Medical Device Protection: IV pole/bag removed from visual field  Less Restrictive Alternative:   emotional support provided   calming techniques promoted   environment adjusted   sensory stimulation limited   safety enhancements provided   positive reinforcement provided  De-Escalation Techniques:   diversional activity encouraged   increased round frequency   quiet time facilitated   reoriented   stimulation decreased   verbally redirected  Diversional Activities: television  Taken 9/7/2023 2204 by Magalys Lyons RN  Medical Device Protection: IV pole/bag removed from visual field  Taken 9/7/2023 2200 by Magalys Lyons RN  Medical Device Protection: IV pole/bag removed from visual field  Less Restrictive Alternative:   calming techniques promoted   coping techniques promoted   emotional support provided   positive reinforcement provided   problem-solving encouraged   safety enhancements provided   sensory stimulation limited   spiritual support provided  De-Escalation Techniques:   appropriate behavior reinforced   diversional activity encouraged   increased round frequency   medication administered   physical activity promoted   quiet time facilitated   reoriented   stimulation decreased   verbally redirected  Diversional Activities: television  Taken 9/7/2023 2015 by Magalys Lyons RN  Diversional Activities: television  Taken 9/7/2023 2002 by Magalys Lyons RN  Medical Device Protection: IV pole/bag removed  from visual field  Taken 9/7/2023 2000 by Magalys Lyons RN  Medical Device Protection: IV pole/bag removed from visual field  Less Restrictive Alternative: calming techniques promoted  De-Escalation Techniques: increased round frequency  Diversional Activities: television  Intervention: Protect Dignity, Rights, and Personal Wellbeing  Recent Flowsheet Documentation  Taken 9/8/2023 0055 by Magalys Lyons RN  Trust Relationship/Rapport:   care explained   choices provided   emotional support provided   questions answered   questions encouraged  Intervention: Protect Skin and Joint Integrity  Recent Flowsheet Documentation  Taken 9/8/2023 0201 by Magalys Lyons RN  Body Position: neutral body alignment  Taken 9/8/2023 0009 by Magalys Lyons RN  Body Position: weight shifting  Taken 9/7/2023 2204 by Magalys Lyons RN  Body Position: tilted  Taken 9/7/2023 2002 by Magalys Lyons RN  Body Position: supine   Goal Outcome Evaluation:  Plan of Care Reviewed With: patient        Progress: no change

## 2023-09-08 NOTE — PROGRESS NOTES
"    Name: Annemarie Ha ADMIT: 2023   : 1965  PCP: Eula Franco APRN    MRN: [id number] LOS: 3 days   AGE/SEX: 58 y.o. female  ROOM: [facility]     Subjective   Subjective   Awake, alert but slow to respond. Able to state first name. Distracted easily by TV.  not present       Objective   Objective   Vital Signs  Temp:  [98.1 °F (36.7 °C)-98.8 °F (37.1 °C)] 98.6 °F (37 °C)  Heart Rate:  [87-97] 90  Resp:  [18] 18  BP: (125-165)/(78-97) 165/87  SpO2:  [96 %-100 %] 100 %  on   ;   Device (Oxygen Therapy): room air  Body mass index is 42.36 kg/m².  Physical Exam  Vitals and nursing note reviewed.   Constitutional:       General: She is not in acute distress.     Appearance: She is obese.   HENT:      Head: Normocephalic.      Mouth/Throat:      Mouth: Mucous membranes are moist.   Eyes:      Conjunctiva/sclera: Conjunctivae normal.   Cardiovascular:      Rate and Rhythm: Normal rate and regular rhythm.   Pulmonary:      Effort: Pulmonary effort is normal. No respiratory distress.      Breath sounds: No wheezing or rales.   Abdominal:      General: Bowel sounds are normal.      Palpations: Abdomen is soft.   Musculoskeletal:      Cervical back: Neck supple.      Right lower leg: No edema.      Left lower leg: No edema.   Skin:     General: Skin is warm and dry.   Neurological:      Mental Status: She is alert.      Comments: Able to state first name  Says \"yes\" when asks if she knows where she is, and \"yes\" when asked if she is in the hospital, otherwise limited verbalization   Psychiatric:         Behavior: Behavior is slowed. Behavior is not agitated.      Comments: Distracted easily     Results Review     I reviewed the patient's new clinical results.  Results from last 7 days   Lab Units 23  0503 23  0642 23  0504 23  0552   WBC 10*3/mm3 5.27 6.07 7.70 5.46   HEMOGLOBIN g/dL 13.2 13.1 12.9 12.3   PLATELETS 10*3/mm3 367 352 320 229     Results from last 7 days "   Lab Units 09/08/23  0503 09/07/23  0642 09/06/23  0504 09/05/23  0551   SODIUM mmol/L 139 138 136 134*   POTASSIUM mmol/L 3.8 4.5 3.8 4.7   CHLORIDE mmol/L 103 101 100 101   CO2 mmol/L 20.4* 22.8 19.0* 18.6*   BUN mg/dL 9 10 11 9   CREATININE mg/dL 0.87 0.98 0.95 0.85   GLUCOSE mg/dL 101* 104* 116* 95   EGFR mL/min/1.73 76.4 66.2 68.7 78.5     Results from last 7 days   Lab Units 09/02/23  1000   ALBUMIN g/dL 4.4   BILIRUBIN mg/dL 0.9   ALK PHOS U/L 70   AST (SGOT) U/L 9   ALT (SGPT) U/L 12     Results from last 7 days   Lab Units 09/08/23  0503 09/07/23  0642 09/06/23  0504 09/05/23  0551 09/03/23  0515 09/02/23  1645 09/02/23  1000   CALCIUM mg/dL 9.2 9.9 9.5 9.6   < >  --  9.2   ALBUMIN g/dL  --   --   --   --   --   --  4.4   MAGNESIUM mg/dL  --   --   --   --   --  2.2  --     < > = values in this interval not displayed.     Results from last 7 days   Lab Units 09/02/23  1000   PROCALCITONIN ng/mL 0.04   LACTATE mmol/L 1.0     No results found for: HGBA1C, POCGLU    IR LUMBAR PUNCTURE DIAGNOSTIC    Result Date: 9/7/2023  Technically successful fluoroscopically guided lumbar puncture.  Reference air kerma: 18.7 mGy  This report was finalized on 9/7/2023 3:40 PM by Dr. Camilo Aguilar M.D.       I have personally reviewed all medications:  Scheduled Medications  aspirin, 81 mg, Oral, Daily  cloNIDine, 0.1 mg, Oral, Nightly  enoxaparin, 40 mg, Subcutaneous, Q12H  famotidine, 40 mg, Oral, Daily  haloperidol, 1 mg, Oral, TID  HYDROcodone-acetaminophen, 1 tablet, Oral, BID AC  lamoTRIgine, 200 mg, Oral, BID  magnesium oxide, 400 mg, Oral, Daily  potassium chloride, 10 mEq, Oral, Daily  senna-docusate sodium, 2 tablet, Oral, BID  sodium chloride, 10 mL, Intravenous, Q12H  topiramate, 50 mg, Oral, Nightly  vitamin B-12, 1,000 mcg, Oral, Daily  Vitamin B-2, 400 mg, Oral, Daily  vitamin D, 50,000 Units, Oral, Weekly    Infusions  hold, 1 each  sodium chloride, 100 mL/hr, Last Rate: 100 mL/hr (09/08/23  0815)    Diet  Diet: Regular/House Diet; Texture: Regular Texture (IDDSI 7); Fluid Consistency: Thin (IDDSI 0)    I have personally reviewed:  [x]  Laboratory   [x]  Microbiology   [x]  Radiology   [x]  EKG/Telemetry  [x]  Cardiology/Vascular   []  Pathology    []  Records       Assessment/Plan     Active Hospital Problems    Diagnosis  POA    **Migraine [G43.909]  Yes    Tetrahydrocannabinol (THC) use disorder, mild, abuse [F12.10]  Yes    Glucose intolerance [E74.39]  Yes    Chronic pain syndrome [G89.4]  Yes    Opioid dependence [F11.20]  Yes    Mood disorder [F39]  Yes    Left-sided headache [R51.9]  Yes    Obstructive sleep apnea [G47.33]  Yes    Dyslipidemia [E78.5]  Yes      Resolved Hospital Problems    Diagnosis Date Resolved POA    Hypokalemia [E87.6] 09/06/2023 Yes       58 y.o. female admitted with Migraine.    Left-sided headache in a patient with a history of MS/seizure activity.  Mostly migraine.  Improved on Topamax/migraine medication.  CNS examination is negative.  CT scan of the brain without contrast is negative.  Negative CTA of the brain except for surgical changes of the right orbit floor.  Negative MRI of the brain.  Positive family history of migraine.  No seizure activity no clinical evidence of temporal arteritis with normal ESR and only mildly elevated CRP.  Neurology following. Delirium precautions  Encephalopathy/PTSD/personality disorder/mood disorder.  She has had a persistent encephalopathy over the last 3 days requiring soft wrist restraints.  No significant improvement with addition of Haldol. Psychiatry on board. S/P LP yesterday. Meningitis/encephalitis panel neg; culture NGTD. Await Neurology recommendations  THC use.   Hypokalemia.  Normal magnesium.  K wnl on today's labs  Hyperglycemia.  History of glucose intolerance.  A1c is normal at 5.  DDD/generalized osteoarthritis/chronic pain syndrome/opioid dependence.  Long term hydrocodone use. Dose decreased to 5 mg twice daily  per  request on 9/7  Obstructive sleep apnea.  Continue CPAP.  Dyslipidemia.  Continue Lipitor.  IV fluids started 9/7 given her lack of oral intake over the past several days although dietician note states she ate 100% breakfast today. Continue to monitor       Lovenox  for DVT prophylaxis.  Full code.  Discussed with patient.  Anticipate discharge  TBD  .      SHERMAN Thorne  [facility]  09/08/23  10:47 EDT

## 2023-09-08 NOTE — PLAN OF CARE
Goal Outcome Evaluation:  Plan of Care Reviewed With: patient        Progress: no change  Outcome Evaluation: Patient was supine in be with bilat wrist restraints. She was agreeable to working with PT & OT to maximize theraputic benefit. She continues to exhibit confusion and delayed processing with difficulty in sequencing and following commands. She required maxAx2 for bed mobility. She stood with minAx2 and took a few steps forawrd & backward with modAx2. She has poor movement initiation and needed therapists to manually weightshift her to take a step. She was motivated to walk forawrd when her brother was present. Patient will benefit from continued skilled PT addressing limitations in functional mobiltiy to improve safety.

## 2023-09-08 NOTE — PROGRESS NOTES
"Nutrition Services    Patient Name:  Annemarie Ha  YOB: 1965  MRN: [id number]  Admit Date:  9/2/2023    FOLLOW UP - CLINICAL NUTRITION    Assessment Date:  09/08/23    Encounter Information         Reason for Encounter RD f/u.     Current Issues Pt is encephalopathic and in restraints. MRI and CT negative. Neurology following and did LP yesterday. Has not been eating much, but had 100% of breakfast this morning per chart. Will continue to monitor.      Current Nutrition Orders & Evaluation of Intake       Oral Nutrition     Current PO Diet Diet: Regular/House Diet; Texture: Regular Texture (IDDSI 7); Fluid Consistency: Thin (IDDSI 0)   Supplement n/a   PO Evaluation     % PO Intake 0-100%    # of Days Evaluated 3    Factors Affecting Intake  altered mental status   --  Anthropometrics          Height    Weight Height: 166.4 cm (65.5\")  Weight: 117 kg (258 lb 8 oz) (09/02/23 1630)    BMI kg/m2 Body mass index is 42.36 kg/m².  Obese, Class III (40 or higher)    Weight trend Loss     Labs        Pertinent Labs Reviewed, listed below     Results from last 7 days   Lab Units 09/08/23  0503 09/07/23  0642 09/06/23  0504 09/03/23  0023 09/02/23  1000   SODIUM mmol/L 139 138 136   < > 135*   POTASSIUM mmol/L 3.8 4.5 3.8   < > 3.3*   CHLORIDE mmol/L 103 101 100   < > 99   CO2 mmol/L 20.4* 22.8 19.0*   < > 23.5   BUN mg/dL 9 10 11   < > 13   CREATININE mg/dL 0.87 0.98 0.95   < > 0.84   CALCIUM mg/dL 9.2 9.9 9.5   < > 9.2   BILIRUBIN mg/dL  --   --   --   --  0.9   ALK PHOS U/L  --   --   --   --  70   ALT (SGPT) U/L  --   --   --   --  12   AST (SGOT) U/L  --   --   --   --  9   GLUCOSE mg/dL 101* 104* 116*   < > 116*    < > = values in this interval not displayed.     Results from last 7 days   Lab Units 09/08/23  0503 09/03/23  0515 09/02/23  1645 09/02/23  1000   MAGNESIUM mg/dL  --   --  2.2  --    HEMOGLOBIN g/dL 13.2   < >  --  12.3   HEMATOCRIT % 38.4   < >  --  35.0   WBC 10*3/mm3 5.27   < >  -- "  7.92   ALBUMIN g/dL  --   --   --  4.4    < > = values in this interval not displayed.     Results from last 7 days   Lab Units 09/08/23  0503 09/07/23  0642 09/06/23  0504 09/05/23  0552 09/04/23  0546   PLATELETS 10*3/mm3 367 352 320 229 209     COVID19   Date Value Ref Range Status   09/02/2023 Not Detected Not Detected - Ref. Range Final     Lab Results   Component Value Date    HGBA1C 5.00 09/02/2023          Medications            Scheduled Medications aspirin, 81 mg, Oral, Daily  cloNIDine, 0.1 mg, Oral, Nightly  enoxaparin, 40 mg, Subcutaneous, Q12H  famotidine, 40 mg, Oral, Daily  haloperidol, 1 mg, Oral, TID  HYDROcodone-acetaminophen, 1 tablet, Oral, BID AC  lamoTRIgine, 200 mg, Oral, BID  magnesium oxide, 400 mg, Oral, Daily  potassium chloride, 10 mEq, Oral, Daily  senna-docusate sodium, 2 tablet, Oral, BID  sodium chloride, 10 mL, Intravenous, Q12H  topiramate, 50 mg, Oral, Nightly  vitamin B-12, 1,000 mcg, Oral, Daily  Vitamin B-2, 400 mg, Oral, Daily  vitamin D, 50,000 Units, Oral, Weekly        Infusions hold, 1 each  sodium chloride, 100 mL/hr, Last Rate: 100 mL/hr (09/08/23 0815)        PRN Medications   acetaminophen **OR** acetaminophen **OR** acetaminophen    albuterol sulfate HFA    senna-docusate sodium **AND** polyethylene glycol **AND** bisacodyl **AND** bisacodyl    Calcium Replacement - Follow Nurse / BPA Driven Protocol    diphenhydrAMINE    haloperidol lactate    hold    loperamide    Magnesium Standard Dose Replacement - Follow Nurse / BPA Driven Protocol    nitroglycerin    ondansetron **OR** ondansetron    Phosphorus Replacement - Follow Nurse / BPA Driven Protocol    Potassium Replacement - Follow Nurse / BPA Driven Protocol    [COMPLETED] Insert Peripheral IV **AND** sodium chloride    sodium chloride    sodium chloride    ziprasidone     Physical Findings          Physical Appearance confused, disoriented, obese, room air   Oral/Mouth Cavity WNL   Edema  generalized, 1+ (trace)    Gastrointestinal last bowel movement: 9/5   Skin  bruising   Tubes/Drains/Lines none   NFPE Not indicated at this time   --  NUTRITION INTERVENTION / PLAN OF CARE  Intervention Goal         Intervention Goal(s) Maintain nutrition status, Disease management/therapy, Increase intake, and Appropriate weight loss     Nutrition Intervention         RD Action Encourage intake and Follow Tx Progress     Nutrition Prescription         Diet Prescription     Supplement Prescription n/a   EN/PN Prescription    New Prescription Ordered? No changes at this time   --  Monitor/Evaluation        Monitor Per protocol   Discharge Needs Pending clinical course   Education Will instruct as appropriate   --    RD to follow up per protocol.    Electronically signed by:  Vane Pavon RD  09/08/23 10:30 EDT

## 2023-09-08 NOTE — PLAN OF CARE
"Goal Outcome Evaluation:  Patient oriented to first name only as far as this RN is able to verify.  At times, when asked her name, she replies, \"puddintain\" and smiles.  She refused meals when asked but with just a little encouragement, patient ate 100% of her breakfast and 75% of her lunch.      Problem: Fall Injury Risk  Goal: Absence of Fall and Fall-Related Injury  Outcome: Ongoing, Progressing  Intervention: Promote Injury-Free Environment  Recent Flowsheet Documentation  Taken 9/8/2023 1600 by Josephine Carias RN  Safety Promotion/Fall Prevention:   fall prevention program maintained   mobility aid in reach   safety round/check completed  Taken 9/8/2023 1353 by Josephine Carias RN  Safety Promotion/Fall Prevention:   fall prevention program maintained   mobility aid in reach   safety round/check completed  Taken 9/8/2023 1200 by Josephine Carias RN  Safety Promotion/Fall Prevention:   fall prevention program maintained   mobility aid in reach   safety round/check completed  Taken 9/8/2023 1000 by Josephine Carias RN  Safety Promotion/Fall Prevention:   fall prevention program maintained   mobility aid in reach   safety round/check completed  Taken 9/8/2023 0740 by Josephine Carias RN  Safety Promotion/Fall Prevention:   fall prevention program maintained   mobility aid in reach   safety round/check completed     Problem: Pain Chronic (Persistent) (Comorbidity Management)  Goal: Acceptable Pain Control and Functional Ability  Outcome: Ongoing, Progressing  Intervention: Optimize Psychosocial Wellbeing  Recent Flowsheet Documentation  Taken 9/8/2023 1600 by Josephine Carias RN  Diversional Activities: television  Taken 9/8/2023 1400 by Josephine Carias RN  Diversional Activities: television  Taken 9/8/2023 1353 by Josephine Carias RN  Diversional Activities: television  Taken 9/8/2023 1200 by Josephine Carias RN  Diversional Activities: television  Taken 9/8/2023 1000 by Josephine Carias RN  Diversional Activities: " television  Taken 9/8/2023 0800 by Josephine Carias RN  Diversional Activities: television  Taken 9/8/2023 0740 by Josephine Carias RN  Diversional Activities: television  Taken 9/8/2023 0702 by Josephine Carias RN  Diversional Activities: television     Problem: Adult Inpatient Plan of Care  Goal: Plan of Care Review  Outcome: Ongoing, Progressing  Goal: Patient-Specific Goal (Individualized)  Outcome: Ongoing, Progressing  Goal: Absence of Hospital-Acquired Illness or Injury  Outcome: Ongoing, Progressing  Intervention: Identify and Manage Fall Risk  Recent Flowsheet Documentation  Taken 9/8/2023 1600 by Josephine Carias RN  Safety Promotion/Fall Prevention:   fall prevention program maintained   mobility aid in reach   safety round/check completed  Taken 9/8/2023 1353 by Josephine Carias RN  Safety Promotion/Fall Prevention:   fall prevention program maintained   mobility aid in reach   safety round/check completed  Taken 9/8/2023 1200 by Josephine Carias RN  Safety Promotion/Fall Prevention:   fall prevention program maintained   mobility aid in reach   safety round/check completed  Taken 9/8/2023 1000 by Josephine Carias RN  Safety Promotion/Fall Prevention:   fall prevention program maintained   mobility aid in reach   safety round/check completed  Taken 9/8/2023 0740 by Josephine Carias RN  Safety Promotion/Fall Prevention:   fall prevention program maintained   mobility aid in reach   safety round/check completed  Intervention: Prevent Skin Injury  Recent Flowsheet Documentation  Taken 9/8/2023 1600 by Josephine Carias RN  Body Position: supine, legs elevated  Taken 9/8/2023 1353 by Josephine Carias RN  Body Position: supine, legs elevated  Taken 9/8/2023 1200 by Josephine Carias RN  Body Position: sitting up in bed  Taken 9/8/2023 0740 by Josephine Carias RN  Body Position:   left   tilted  Skin Protection:   adhesive use limited   incontinence pads utilized   transparent dressing maintained  Intervention: Prevent and  Manage VTE (Venous Thromboembolism) Risk  Recent Flowsheet Documentation  Taken 9/8/2023 1353 by Josephine Carias RN  VTE Prevention/Management:   bilateral   sequential compression devices on  Taken 9/8/2023 0740 by Josephine Carias RN  VTE Prevention/Management:   bilateral   sequential compression devices on  Intervention: Prevent Infection  Recent Flowsheet Documentation  Taken 9/8/2023 1600 by Josephine Carias RN  Infection Prevention:   hand hygiene promoted   single patient room provided  Taken 9/8/2023 1353 by Josephine Carias RN  Infection Prevention:   hand hygiene promoted   single patient room provided  Taken 9/8/2023 1200 by Josephine Carias RN  Infection Prevention:   hand hygiene promoted   single patient room provided  Taken 9/8/2023 1000 by Josephine Carias RN  Infection Prevention:   hand hygiene promoted   single patient room provided  Taken 9/8/2023 0740 by Josephine Carias RN  Infection Prevention:   hand hygiene promoted   single patient room provided  Goal: Optimal Comfort and Wellbeing  Outcome: Ongoing, Progressing  Goal: Readiness for Transition of Care  Outcome: Ongoing, Progressing     Problem: Restraint, Nonviolent  Goal: Absence of Harm or Injury  Outcome: Ongoing, Progressing  Intervention: Implement Least Restrictive Safety Strategies  Recent Flowsheet Documentation  Taken 9/8/2023 1600 by Josephine Carias RN  Medical Device Protection:   IV pole/bag removed from visual field   tubing secured  Less Restrictive Alternative:   calming techniques promoted   sensory stimulation limited   safety enhancements provided   positive reinforcement provided   emotional support provided   bed alarm in use  De-Escalation Techniques:   appropriate behavior reinforced   verbally redirected   stimulation decreased   reoriented   quiet time facilitated  Diversional Activities: television  Taken 9/8/2023 1400 by Josephine Carias RN  Medical Device Protection:   IV pole/bag removed from visual field   tubing  secured  Less Restrictive Alternative:   calming techniques promoted   bed alarm in use   environment adjusted   emotional support provided   sensory stimulation limited   therapeutic music  De-Escalation Techniques:   quiet time facilitated   reoriented   stimulation decreased   verbally redirected  Diversional Activities: television  Taken 9/8/2023 1353 by Josephine Carias RN  Medical Device Protection:   IV pole/bag removed from visual field   tubing secured  Diversional Activities: television  Taken 9/8/2023 1200 by Josephine Carias, RN  Medical Device Protection:   IV pole/bag removed from visual field   tubing secured  Less Restrictive Alternative:   calming techniques promoted   bed alarm in use   environment adjusted   emotional support provided   sensory stimulation limited   therapeutic music  De-Escalation Techniques:   diversional activity encouraged   appropriate behavior reinforced   quiet time facilitated   verbally redirected   stimulation decreased   reoriented  Diversional Activities: television  Taken 9/8/2023 1000 by Josephine Carias RN  Medical Device Protection:   IV pole/bag removed from visual field   tubing secured  Less Restrictive Alternative:   calming techniques promoted   bed alarm in use   environment adjusted   emotional support provided   sensory stimulation limited   therapeutic music  De-Escalation Techniques:   diversional activity encouraged   quiet time facilitated   reoriented   verbally redirected  Diversional Activities: television  Taken 9/8/2023 0800 by Josephine Carias, RN  Medical Device Protection:   IV pole/bag removed from visual field   tubing secured  Less Restrictive Alternative:   calming techniques promoted   bed alarm in use   environment adjusted   emotional support provided   sensory stimulation limited   therapeutic music  De-Escalation Techniques:   appropriate behavior reinforced   reoriented   verbally redirected   stimulation decreased   quiet time  facilitated  Diversional Activities: television  Taken 9/8/2023 0740 by Josephine Carias RN  Diversional Activities: television  Taken 9/8/2023 0702 by Josephine Carias RN  Medical Device Protection:   IV pole/bag removed from visual field   tubing secured  Less Restrictive Alternative:   calming techniques promoted   bed alarm in use   environment adjusted   emotional support provided   sensory stimulation limited   therapeutic music  De-Escalation Techniques:   appropriate behavior reinforced   diversional activity encouraged   quiet time facilitated   reoriented   stimulation decreased   verbally redirected  Diversional Activities: television  Intervention: Protect Skin and Joint Integrity  Recent Flowsheet Documentation  Taken 9/8/2023 1600 by Josephine Carias RN  Body Position: supine, legs elevated  Taken 9/8/2023 1353 by Josephine Carias RN  Body Position: supine, legs elevated  Taken 9/8/2023 1200 by Josephine Carias RN  Body Position: sitting up in bed  Taken 9/8/2023 0740 by Josephine Carias RN  Body Position:   left   tilted  Goal: Absence of Harm or Injury  Outcome: Ongoing, Progressing  Intervention: Implement Least Restrictive Safety Strategies  Recent Flowsheet Documentation  Taken 9/8/2023 1600 by Josephine Carias RN  Medical Device Protection:   IV pole/bag removed from visual field   tubing secured  Less Restrictive Alternative:   calming techniques promoted   sensory stimulation limited   safety enhancements provided   positive reinforcement provided   emotional support provided   bed alarm in use  De-Escalation Techniques:   appropriate behavior reinforced   verbally redirected   stimulation decreased   reoriented   quiet time facilitated  Diversional Activities: television  Taken 9/8/2023 1400 by Josephine Carias RN  Medical Device Protection:   IV pole/bag removed from visual field   tubing secured  Less Restrictive Alternative:   calming techniques promoted   bed alarm in use   environment adjusted    emotional support provided   sensory stimulation limited   therapeutic music  De-Escalation Techniques:   quiet time facilitated   reoriented   stimulation decreased   verbally redirected  Diversional Activities: television  Taken 9/8/2023 1353 by Josephine Carias RN  Medical Device Protection:   IV pole/bag removed from visual field   tubing secured  Diversional Activities: television  Taken 9/8/2023 1200 by Josephine Carias RN  Medical Device Protection:   IV pole/bag removed from visual field   tubing secured  Less Restrictive Alternative:   calming techniques promoted   bed alarm in use   environment adjusted   emotional support provided   sensory stimulation limited   therapeutic music  De-Escalation Techniques:   diversional activity encouraged   appropriate behavior reinforced   quiet time facilitated   verbally redirected   stimulation decreased   reoriented  Diversional Activities: television  Taken 9/8/2023 1000 by Josephine Carias RN  Medical Device Protection:   IV pole/bag removed from visual field   tubing secured  Less Restrictive Alternative:   calming techniques promoted   bed alarm in use   environment adjusted   emotional support provided   sensory stimulation limited   therapeutic music  De-Escalation Techniques:   diversional activity encouraged   quiet time facilitated   reoriented   verbally redirected  Diversional Activities: television  Taken 9/8/2023 0800 by Josephine Carias RN  Medical Device Protection:   IV pole/bag removed from visual field   tubing secured  Less Restrictive Alternative:   calming techniques promoted   bed alarm in use   environment adjusted   emotional support provided   sensory stimulation limited   therapeutic music  De-Escalation Techniques:   appropriate behavior reinforced   reoriented   verbally redirected   stimulation decreased   quiet time facilitated  Diversional Activities: television  Taken 9/8/2023 0740 by Josephine Carias RN  Diversional Activities:  television  Taken 9/8/2023 0702 by Josephine Carias RN  Medical Device Protection:   IV pole/bag removed from visual field   tubing secured  Less Restrictive Alternative:   calming techniques promoted   bed alarm in use   environment adjusted   emotional support provided   sensory stimulation limited   therapeutic music  De-Escalation Techniques:   appropriate behavior reinforced   diversional activity encouraged   quiet time facilitated   reoriented   stimulation decreased   verbally redirected  Diversional Activities: television  Intervention: Protect Skin and Joint Integrity  Recent Flowsheet Documentation  Taken 9/8/2023 1600 by Josephine Carias RN  Body Position: supine, legs elevated  Taken 9/8/2023 1353 by Josephine Carias RN  Body Position: supine, legs elevated  Taken 9/8/2023 1200 by Josephine Carias RN  Body Position: sitting up in bed  Taken 9/8/2023 0740 by Josephine Carias RN  Body Position:   left   tilted     Problem: Skin Injury Risk Increased  Goal: Skin Health and Integrity  Outcome: Ongoing, Progressing  Intervention: Optimize Skin Protection  Recent Flowsheet Documentation  Taken 9/8/2023 1600 by Josephine Carias RN  Head of Bed (HOB) Positioning: HOB at 20-30 degrees  Taken 9/8/2023 1353 by Josephine Carias RN  Head of Bed (HOB) Positioning: HOB at 30-45 degrees  Taken 9/8/2023 1200 by Josephine Carias RN  Head of Bed (HOB) Positioning: HOB at 60-90 degrees  Taken 9/8/2023 0740 by Josephine Carias RN  Pressure Reduction Techniques:   frequent weight shift encouraged   weight shift assistance provided  Head of Bed (HOB) Positioning: HOB at 20-30 degrees  Pressure Reduction Devices:   alternating pressure pump (ADD)   positioning supports utilized   pressure-redistributing mattress utilized  Skin Protection:   adhesive use limited   incontinence pads utilized   transparent dressing maintained

## 2023-09-08 NOTE — PROGRESS NOTES
"DOS: 2023  NAME: Annemarie Ha   : 1965  PCP: Eula Franco APRN  Chief Complaint   Patient presents with    Headache     Worst HA of Life     Neurology    Subjective:     Interval History  Pt seen in follow up today.  Patient is awake and alert on evaluation today, remains in bilateral soft restraints.  She denies any headache or dizziness.  She is able to tell me who she is and recognizes her brother who is at bedside, is also able to confirm that she is in the hospital.  Per patient's brother, the patient did report some headache and fatigue prior to hospital admission and said she felt like she could be coming down with \"the flu\" or something similar.  No history of a prior or prolonged viral or other type illness prior to this admission.  History taken from: patient chart family RN    Part of the note was copied and pasted, but thoroughly reviewed for any corrections.    Objective:  Vital signs: /77 (BP Location: Right arm, Patient Position: Lying)   Pulse 82   Temp 99.1 °F (37.3 °C) (Oral)   Resp 18   Ht 166.4 cm (65.5\")   Wt 117 kg (258 lb 8 oz)   SpO2 100%   BMI 42.36 kg/m²       Physical Exam:  GENERAL: NAD, alert and cooperative  HEENT: Normocephalic, atraumatic   Resp: Even and unlabored, no audible wheezes  Skin: Warm and dry, no rashes or birth marks.   Psychiatric: Normal mood and affect.     Neurological:   MS: Awake, perseverating, oriented to self and place and recognizes her brother at bedside, recent/remote memory impaired, decreased attention/concentration, language intact, no neglect   CN: Visual acuity normal bilaterally, PERRL, EOMI, no nystagmus, no facial droop, no dysarthria, tongue midline, symmetric shoulder shrug  Motor: 5/5 strength in all 4 ext. Normal tone.  No tremor.   Sensory: Intact to light touch in all four ext.  Gait and station: Deferred    Results Review:    I reviewed the patient's new clinical results.  I reviewed the patient's other " test results and agree with the interpretation  Discussed with Dr. Leavitt    Current Medications:  Scheduled Medications:aspirin, 81 mg, Oral, Daily  cloNIDine, 0.1 mg, Oral, Nightly  enoxaparin, 40 mg, Subcutaneous, Q12H  famotidine, 40 mg, Oral, Daily  haloperidol, 1 mg, Oral, TID  HYDROcodone-acetaminophen, 1 tablet, Oral, BID AC  lamoTRIgine, 200 mg, Oral, BID  magnesium oxide, 400 mg, Oral, Daily  potassium chloride, 10 mEq, Oral, Daily  senna-docusate sodium, 2 tablet, Oral, BID  sodium chloride, 10 mL, Intravenous, Q12H  topiramate, 50 mg, Oral, Nightly  vitamin B-12, 1,000 mcg, Oral, Daily  Vitamin B-2, 400 mg, Oral, Daily  vitamin D, 50,000 Units, Oral, Weekly      Infusions: hold, 1 each  sodium chloride, 100 mL/hr, Last Rate: 100 mL/hr (09/08/23 0815)      PRN Medications:    acetaminophen **OR** acetaminophen **OR** acetaminophen    albuterol sulfate HFA    senna-docusate sodium **AND** polyethylene glycol **AND** bisacodyl **AND** bisacodyl    Calcium Replacement - Follow Nurse / BPA Driven Protocol    diphenhydrAMINE    haloperidol lactate    hold    loperamide    Magnesium Standard Dose Replacement - Follow Nurse / BPA Driven Protocol    nitroglycerin    ondansetron **OR** ondansetron    Phosphorus Replacement - Follow Nurse / BPA Driven Protocol    Potassium Replacement - Follow Nurse / BPA Driven Protocol    [COMPLETED] Insert Peripheral IV **AND** sodium chloride    sodium chloride    sodium chloride    ziprasidone    Laboratory results:  Lab Results   Component Value Date    GLUCOSE 101 (H) 09/08/2023    CALCIUM 9.2 09/08/2023     09/08/2023    K 3.8 09/08/2023    CO2 20.4 (L) 09/08/2023     09/08/2023    BUN 9 09/08/2023    CREATININE 0.87 09/08/2023    EGFRIFAFRI 92 02/08/2016    EGFRIFNONA 77 11/29/2017    BCR 10.3 09/08/2023    ANIONGAP 15.6 (H) 09/08/2023     Lab Results   Component Value Date    WBC 5.27 09/08/2023    HGB 13.2 09/08/2023    HCT 38.4 09/08/2023    MCV 88.9  09/08/2023     09/08/2023          No results found for: INR, PROTIME  Lab Results   Component Value Date    TSH 0.853 09/02/2023     No components found for: LDLCALC  Lab Results   Component Value Date    HGBA1C 5.00 09/02/2023     No components found for: B12    Review and interpretation of imaging:   CT Head Without Contrast    Result Date: 9/2/2023  HEAD CT  HISTORY: Left side headache.  TECHNIQUE: Noncontrast head CT is provided. Comparison: None.  FINDINGS: Screw-plate hardware along the inferior right orbital rim from old fracture repair. Paranasal sinuses are clear. 0The ventricles are normal in caliber. The brain parenchyma and extra-axial spaces appear normal. There is no hemorrhage or acute ischemic change. The bones of the skull appear normal. The mastoid air cells, middle ears, and visualized paranasal sinuses are clear.      Negative head CT.  Radiation dose reduction techniques were utilized, including automated exposure control and exposure modulation based on body size.   This report was finalized on 9/2/2023 11:08 AM by Dr. Loco Barlow M.D.      MRI Brain Without Contrast    Result Date: 9/3/2023  BRAIN MRI WITHOUT CONTRAST  HISTORY: Severe headache left-sided; R51.9-Headache, unspecified; G35-Multiple sclerosis  COMPARISON: September 2, 2023.  FINDINGS:  Multiplanar images of the head were obtained without gadolinium. No areas of restricted diffusion are seen to suggest acute infarct. Ventricles are normal in size. There is no midline shift or mass effect. There is some mild periventricular microangiopathic change. No abnormality is seen on gradient echo imaging. There is artifact overlying the right orbit, related to prior plate and screw fixation of the anterior wall of the right maxillary sinus. No T2 weighted imaging or postcontrast imaging was obtained, as the patient could not further tolerate the examination.      1. No acute intracranial abnormality. Please note, T2 and  postcontrast imaging was not obtained, as the patient could not tolerate any further imaging.   This report was finalized on 9/3/2023 5:13 AM by Dr. Kelsie Chowdhury M.D.      IR LUMBAR PUNCTURE DIAGNOSTIC    Result Date: 9/7/2023  FLUOROSCOPIC GUIDED LUMBAR PUNCTURE  HISTORY: encephalopathy, persistent; R51.9-Headache, unspecified; G35-Multiple sclerosis   TECHNIQUE: Risks, benefits and alternatives were discussed with the patient's family representative. Informed consent obtained. A pre-procedure timeout was performed confirming correct patient and procedure.  The patient was placed on the fluoroscopy table, and the skin overlying the lumbar spine was prepped and draped in the usual sterile fashion with 2% chlorhexidine. 1% lidocaine was utilized for local anesthesia.  Next, a 12-gauge spinal needle was then advanced into the CSF space under fluoroscopic guidance at the L3 level. Approximately 10 mL of clear CSF was then obtained and submitted to the laboratory for evaluation.  The patient tolerated the procedure well without immediate complication.       Technically successful fluoroscopically guided lumbar puncture.  Reference air kerma: 18.7 mGy  This report was finalized on 9/7/2023 3:40 PM by Dr. Camilo Aguilar M.D.      CT Angiogram Head    Result Date: 9/3/2023  CONTRAST-ENHANCED CT ANGIOGRAM OF THE HEAD 09/03/2023  CLINICAL HISTORY: Severe headache.  TECHNIQUE: Spiral CT images were obtained from the base of the skull to the vertex both pre-intravenous and postintravenous contrast and images were reformatted and submitted in 3 mm thick axial, sagittal and coronal CT sections with brain algorithm. Additional spiral CT angiographic images were obtained from the C1-2 cervical level up through the vertex of the skull during the arterial phase of contrast and the images were reformatted and submitted in 1 mm thick axial, sagittal and coronal CT sections with soft tissue algorithm and 3D reconstructions were  performed to complete the CT angiogram of the head.  This is correlated to a CT of the head without contrast yesterday 09/02/2023 at 10:30 a.m. and an MRI of the brain without contrast earlier this morning 09/03/2023 at 1:30 a.m.  FINDINGS:  HEAD CT: The brain parenchyma is normal in attenuation. The ventricles are normal in size. I see no focal mass effect. There is no midline shift. No extra-axial fluid collections are identified. There is no evidence of acute intra-cranial hemorrhage. Following contrast no abnormal areas of enhancement are seen in the head. The calvarium and the skull base are normal in appearance. There is an anterior plate over the anterior wall of the right maxillary sinus. The paranasal sinuses, the mastoid air cells and middle ear cavities are clear.  CT ANGIOGRAM OF THE HEAD:  The visualized portions of the distal vertebral arteries are normal in appearance from the C1-2 cervical level to the vertebrobasilar junction. 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 This finding is best demonstrated on coronal thin cut CTA images 119-123. The basilar artery and the basilar tip is normal in appearance. The posterior cerebral and superior cerebellar arteries are within normal limits. The upper cervical, petrous, cavernous and supracavernous segments of the internal carotid arteries are within normal limits. The left posterior communicating artery origin is within normal limits.  The A1 segments of the anterior cerebral arteries and the anterior communicating artery origin and A2 segments of the anterior cerebral arteries are within normal limits. The M1 segments of the middle cerebral arteries and the middle cerebral artery bifurcations are within normal limits.      1. There is an anterior plate projecting over the junction of the anterior wall of the right maxillary sinus and anterior right  orbital floor from previous surgery and correlation with surgical history is suggested.  2. 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  Radiation dose reduction techniques were utilized, including automated exposure control and exposure modulation based on body size.   This report was finalized on 9/3/2023 10:13 PM by Dr. Joselo Urbano M.D.      XR Chest 1 Vw    Result Date: 2023  RE[facility]ING YOUR TEST RESULTS [facility] IS NOT A SUBSTITUTE FOR DISCUSSING THOSE RESULTS WITH YOUR HEALTH CARE PROVIDER. PLEASE CONTACT YOUR PROVIDER VIA [facility] TO DISCUSS ANY QUESTIONS OR CONCERNS YOU MAY HAVE REGARDING THESE TEST RESULTS.  RADIOLOGY REPORT FACILITY:  [facility] UNIT/AGE/GENDER: M.ED  ER      AGE:58 Y          SEX:F PATIENT NAME/:  GABRIEL LUGO    1965 UNIT NUMBER:  [id number] ACCOUNT NUMBER:  [id number] ACCESSION NUMBER:  [id number] EXAMINATION: XR CHEST 1 VW DATE: 2023 HISTORY: Fever. COMPARISON: None. FINDINGS: A single view of the chest demonstrates clear lungs. There is no pneumothorax or pleural effusion. The heart size and mediastinal contour are normal. IMPRESSION: No acute cardiopulmonary radiographic abnormality. Dictated by: Loco Hathaway M.D. Images and Report reviewed and interpreted by: Loco Hathaway M.D. <PS><Electronically signed by: Loco Hathaway M.D.> 2023 0618 D: 2023 T: 2023     Work-up to date:  CT wo: Negative head CT  MRI brain wo 9/3: No acute intracranial abnormality.  T2 and postcontrast imaging not obtained as patient cannot tolerate further imaging.  CTA head: No intracranial aneurysm seen.  LP: Protein 91.9, glucose 61, /167, 98 lymphocytes, 2 monocytes, /5, culture no  growth, meningitis/encephalitis (-)    Lab Review: VZV CSF (-), CBC and CMP unremarkable    Diagnoses:  Acute encephalopathy  New onset headache with associated left orbital pain, resolved  History of multiple sclerosis, relapsing remitting type    Impression: Mrs. Brooklyn Ha initially presented to [facility]/20[facility] for left-sided temporal headache and eye pain that she reported as the worst headache of her life.  She reported the headache was ongoing for the past 6 months but increased in intensity and frequency with associated blurry vision that developed.  She does have a history of multiple sclerosis for which she takes Ocrevus, follows with Dr. Tien Boateng.  She became acutely encephalopathic a few days into her admission and remains confused and in soft wrist restraints bilaterally.  Psychiatry is following and has added Haldol to her regimen.  An LP was completed yesterday with abnormal CSF results including elevated protein and total nucleated cells.  Infectious disease has been consulted and has added more CSF and serum studies and recommends a repeat MRI brain with and without contrast.  The patient had difficulty completing MRI brain w/wo several days ago however we will attempt again with medications to help keep her calm during the scan.  We will continue to follow with you.  Call RRT for acute neurologic change or concern.    Plan:  Consult Infectious Disease - appreciate their recs  Repeat MRI brain with/without contrast  Follow-up CSF autoimmune encephalopathy panel, non-gyn cytology, RPR, WNV CSF and serum, Cryptococcal AG CSF and serum, CSF fungal cx, Toxoplasma gondii CSF and serum, HIV ab   ?Steroid trial  Delirium precautions  Haldol per Psychiatry   Consider repeat LP next week if patient doesn't improve with additional Prion and Ig panel testing    I have discussed the above with the patient, family and Dr. Leavitt who are in agreement with the plan.     Danelle Stewart,  "APRN  09/08/23  12:46 EDT    \"Dictated utilizing Dragon dictation\".     "

## 2023-09-08 NOTE — NURSING NOTE
Notified MRI that we have orders for two medications to pre-medicate patient before MRI and request they let us know ahead of time before sending for her so we can administer medication.  Evan verbalized understanding.
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
## 2023-09-08 NOTE — PLAN OF CARE
Goal Outcome Evaluation:  Plan of Care Reviewed With: patient        Progress: no change  Outcome Evaluation: Pt was found supine in bed with conrado wrist restraints on, pt agreeable to OT/PT session to maximize safety and participation in fxl activity. She continues to be confused, delayed processing, sequencing, and difficulty following simple commands. She req's maxAx2 to sit EOB d/t poor initiaition of movement and be somewhat resistive. She stands with minAx2 and is able to take a few lateral and forward steps at the bedside with max VCs for sequencing- ModAx2 to weight shift. Pt with soiled brief requiring total A for hygiene. UB bathing with set-up/SBA and frequent cues. Pt left supine in bed with all needs met, nsg aide present.
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
## 2023-09-09 ENCOUNTER — APPOINTMENT (OUTPATIENT)
Dept: MRI IMAGING | Facility: HOSPITAL | Age: 58
DRG: 098 | End: 2023-09-09
Payer: MEDICARE

## 2023-09-09 LAB
ANION GAP SERPL CALCULATED.3IONS-SCNC: 12.4 MMOL/L (ref 5–15)
BASOPHILS # BLD AUTO: 0.02 10*3/MM3 (ref 0–0.2)
BASOPHILS NFR BLD AUTO: 0.4 % (ref 0–1.5)
BUN SERPL-MCNC: 12 MG/DL (ref 8–23)
BUN/CREAT SERPL: 12.2 (ref 7–25)
CALCIUM SPEC-SCNC: 9.4 MG/DL (ref 8.6–10.5)
CHLORIDE SERPL-SCNC: 104 MMOL/L (ref 98–107)
CO2 SERPL-SCNC: 21.6 MMOL/L (ref 22–29)
CREAT SERPL-MCNC: 0.98 MG/DL (ref 0.57–1)
DEPRECATED RDW RBC AUTO: 40.6 FL (ref 37–54)
EGFRCR SERPLBLD CKD-EPI 2021: 66.2 ML/MIN/1.73
EOSINOPHIL # BLD AUTO: 0.19 10*3/MM3 (ref 0–0.4)
EOSINOPHIL NFR BLD AUTO: 3.4 % (ref 0.3–6.2)
ERYTHROCYTE [DISTWIDTH] IN BLOOD BY AUTOMATED COUNT: 12.4 % (ref 12.3–15.4)
GLUCOSE SERPL-MCNC: 100 MG/DL (ref 65–99)
HCT VFR BLD AUTO: 39.3 % (ref 34–46.6)
HGB BLD-MCNC: 13.6 G/DL (ref 12–15.9)
IMM GRANULOCYTES # BLD AUTO: 0.07 10*3/MM3 (ref 0–0.05)
IMM GRANULOCYTES NFR BLD AUTO: 1.2 % (ref 0–0.5)
LYMPHOCYTES # BLD AUTO: 0.99 10*3/MM3 (ref 0.7–3.1)
LYMPHOCYTES NFR BLD AUTO: 17.6 % (ref 19.6–45.3)
MCH RBC QN AUTO: 31.1 PG (ref 26.6–33)
MCHC RBC AUTO-ENTMCNC: 34.6 G/DL (ref 31.5–35.7)
MCV RBC AUTO: 89.9 FL (ref 79–97)
MONOCYTES # BLD AUTO: 0.75 10*3/MM3 (ref 0.1–0.9)
MONOCYTES NFR BLD AUTO: 13.3 % (ref 5–12)
NEUTROPHILS NFR BLD AUTO: 3.6 10*3/MM3 (ref 1.7–7)
NEUTROPHILS NFR BLD AUTO: 64.1 % (ref 42.7–76)
NRBC BLD AUTO-RTO: 0 /100 WBC (ref 0–0.2)
PLATELET # BLD AUTO: 378 10*3/MM3 (ref 140–450)
PMV BLD AUTO: 9.8 FL (ref 6–12)
POTASSIUM SERPL-SCNC: 3.9 MMOL/L (ref 3.5–5.2)
RBC # BLD AUTO: 4.37 10*6/MM3 (ref 3.77–5.28)
SODIUM SERPL-SCNC: 138 MMOL/L (ref 136–145)
WBC NRBC COR # BLD: 5.62 10*3/MM3 (ref 3.4–10.8)

## 2023-09-09 PROCEDURE — 70553 MRI BRAIN STEM W/O & W/DYE: CPT

## 2023-09-09 PROCEDURE — 99233 SBSQ HOSP IP/OBS HIGH 50: CPT | Performed by: INTERNAL MEDICINE

## 2023-09-09 PROCEDURE — 25010000002 ZIPRASIDONE MESYLATE PER 10 MG: Performed by: SPECIALIST

## 2023-09-09 PROCEDURE — 80048 BASIC METABOLIC PNL TOTAL CA: CPT | Performed by: INTERNAL MEDICINE

## 2023-09-09 PROCEDURE — 25010000002 DIPHENHYDRAMINE PER 50 MG: Performed by: SPECIALIST

## 2023-09-09 PROCEDURE — 25010000002 ENOXAPARIN PER 10 MG: Performed by: INTERNAL MEDICINE

## 2023-09-09 PROCEDURE — 25010000002 HALOPERIDOL LACTATE PER 5 MG: Performed by: SPECIALIST

## 2023-09-09 PROCEDURE — 0 GADOBENATE DIMEGLUMINE 529 MG/ML SOLUTION: Performed by: HOSPITALIST

## 2023-09-09 PROCEDURE — 99233 SBSQ HOSP IP/OBS HIGH 50: CPT | Performed by: NURSE PRACTITIONER

## 2023-09-09 PROCEDURE — 85025 COMPLETE CBC W/AUTO DIFF WBC: CPT | Performed by: INTERNAL MEDICINE

## 2023-09-09 PROCEDURE — A9577 INJ MULTIHANCE: HCPCS | Performed by: HOSPITALIST

## 2023-09-09 RX ADMIN — MAGNESIUM OXIDE 400 MG (241.3 MG MAGNESIUM) TABLET 400 MG: TABLET at 09:43

## 2023-09-09 RX ADMIN — Medication 1000 MCG: at 09:42

## 2023-09-09 RX ADMIN — HALOPERIDOL 1 MG: 1 TABLET ORAL at 21:38

## 2023-09-09 RX ADMIN — DIPHENHYDRAMINE HYDROCHLORIDE 25 MG: 50 INJECTION, SOLUTION INTRAMUSCULAR; INTRAVENOUS at 09:53

## 2023-09-09 RX ADMIN — ENOXAPARIN SODIUM 40 MG: 100 INJECTION SUBCUTANEOUS at 09:42

## 2023-09-09 RX ADMIN — LAMOTRIGINE 200 MG: 100 TABLET ORAL at 20:40

## 2023-09-09 RX ADMIN — Medication 10 ML: at 21:00

## 2023-09-09 RX ADMIN — TOPIRAMATE 50 MG: 50 TABLET, FILM COATED ORAL at 20:40

## 2023-09-09 RX ADMIN — Medication 10 ML: at 11:13

## 2023-09-09 RX ADMIN — ZIPRASIDONE MESYLATE 20 MG: 20 INJECTION, POWDER, LYOPHILIZED, FOR SOLUTION INTRAMUSCULAR at 09:54

## 2023-09-09 RX ADMIN — ERGOCALCIFEROL 50000 UNITS: 1.25 CAPSULE ORAL at 09:43

## 2023-09-09 RX ADMIN — ENOXAPARIN SODIUM 40 MG: 100 INJECTION SUBCUTANEOUS at 20:39

## 2023-09-09 RX ADMIN — SENNOSIDES AND DOCUSATE SODIUM 2 TABLET: 50; 8.6 TABLET ORAL at 20:39

## 2023-09-09 RX ADMIN — FAMOTIDINE 40 MG: 20 TABLET, FILM COATED ORAL at 09:43

## 2023-09-09 RX ADMIN — CLONIDINE HYDROCHLORIDE 0.1 MG: 0.1 TABLET ORAL at 20:40

## 2023-09-09 RX ADMIN — HALOPERIDOL 1 MG: 1 TABLET ORAL at 15:53

## 2023-09-09 RX ADMIN — Medication 400 MG: at 09:43

## 2023-09-09 RX ADMIN — HALOPERIDOL 1 MG: 1 TABLET ORAL at 09:42

## 2023-09-09 RX ADMIN — POTASSIUM CHLORIDE 10 MEQ: 750 TABLET, EXTENDED RELEASE ORAL at 09:43

## 2023-09-09 RX ADMIN — GADOBENATE DIMEGLUMINE 20 ML: 529 INJECTION, SOLUTION INTRAVENOUS at 10:47

## 2023-09-09 RX ADMIN — ASPIRIN 81 MG: 81 TABLET, COATED ORAL at 09:43

## 2023-09-09 RX ADMIN — LAMOTRIGINE 200 MG: 100 TABLET ORAL at 09:42

## 2023-09-09 RX ADMIN — DIPHENHYDRAMINE HYDROCHLORIDE 25 MG: 50 INJECTION, SOLUTION INTRAMUSCULAR; INTRAVENOUS at 15:53

## 2023-09-09 NOTE — PROGRESS NOTES
ID note for encephalitis    S: denies pain. No fever      Vital Signs   Temp:  [98.4 °F (36.9 °C)-99.1 °F (37.3 °C)] 98.4 °F (36.9 °C)  Heart Rate:  [79-90] 79  Resp:  [16-18] 16  BP: (116-165)/(73-87) 148/82    Physical Exam:   GENERAL: Awake and alert, sickly appearing  HEENT: Oropharynx is clear. Hearing is grossly normal.   EYES: . No conjunctival injection. No lid lag.   LUNGS:normal respiratory effort.   SKIN: no cutaneous eruptions in exposed areas  PSYCHIATRIC: Following commands for me    Labs:   Wbc 5.6, hgb 13, plt 378  Cr 0.98    LP results  167 nucleated cells (98% PMNs)  5 RBCs  Glc 61  Pro 91  PCR negative    Microbiology:  9/2 RPP: negative  9/2 BCx: negative  9/7 CSF PCR: negative  9/7 CSF Cx: NGTD      ASSESSMENT/PLAN:  Encephalopathy  CSF pleocytosis (lymphocytic predominant)  Multiple sclerosis on Ocrevus  Morbid obesity BMI 42  Headache    This is the first day that I saw her and felt her mental status was not awful as she was responding appropriately to me.  She has been agitated and psychotic.    ID work up back so far HIV, RPR, Crypto Ag neg; Toxo Ab, WNV Ab, CSF fungus culture, and CSF AFB culture pending; CSF WNV, CSF Crypto, CSF Toxo ordered but not collected (attempted to d/w lab but could not get to reference lab on multiple attempts)     Repeating MRI brain with and wo    Possible steroid trial depending on clinical response and repeat MRI  D/w Dr Bonilla re imprssion and plans

## 2023-09-09 NOTE — PLAN OF CARE
Goal Outcome Evaluation:      Patient alert confused follows simple commands. All oral meds whole given with applesauce. Plans to do MRI sat am with prn med and  involved.  will be here at 6 am, this is to give the patient the best attempt to get the MRI completed. No c/o pain or nausea noted. No acute distress noted. Pure wick changed this shift incont care and skin care provided, will continue to monitor

## 2023-09-09 NOTE — PROGRESS NOTES
"DOS: 2023  NAME: Annemarie Ha   : 1965  PCP: Eula Franco APRN  Chief Complaint   Patient presents with    Headache     Worst HA of Life   Patient seen in follow-up today; new to me      Neurology     Subjective: Patient remains somewhat impulsive/confused-history require bilateral extremity soft wrist restraints although improving-some appropriate verbal responses/date of birth/year-unable to verbalize current location/situation/month/day.    No family at bedside    Objective:  Vital signs: /81 (BP Location: Right arm, Patient Position: Lying)   Pulse 74   Temp 98.1 °F (36.7 °C) (Oral)   Resp 16   Ht 166.4 cm (65.5\")   Wt 117 kg (258 lb 8 oz)   SpO2 100%   BMI 42.36 kg/m²       HEENT: Normocephalic, atraumatic   COR: RRR  Resp: Even and unlabored  Extremities: Equal pulses, nondistal embolization    Neurological:   MS: Alert.  Oriented to self date of birth-unable to verbalize location situation/month/day.  No obvious neglect.-Blinks to threat bilaterally-distant/far off look  CN: II-XII normal  Motor: 5/5, normal tone  Sensory: Intact-to light touch      Laboratory results:  Lab Results   Component Value Date    GLUCOSE 100 (H) 2023    CALCIUM 9.4 2023     2023    K 3.9 2023    CO2 21.6 (L) 2023     2023    BUN 12 2023    CREATININE 0.98 2023    EGFRIFAFRI 92 2016    EGFRIFNONA 77 2017    BCR 12.2 2023    ANIONGAP 12.4 2023     Lab Results   Component Value Date    WBC 5.62 2023    HGB 13.6 2023    HCT 39.3 2023    MCV 89.9 2023     2023     Lab Results   Component Value Date    CHOL 208 (H) 2017    CHOL 228 (H) 2017     Lab Results   Component Value Date    HDL 65 2019    HDL 67 (H) 2017    HDL 62 2017     Lab Results   Component Value Date     (H) 2019     (H) 2017     (H) 2017     Lab " "Results   Component Value Date    TRIG 59 06/17/2019    TRIG 121 11/29/2017    TRIG 89 08/22/2017         Lab 09/02/23  1645   HEMOGLOBIN A1C 5.00      Review and interpretation of imaging:  Imaging discussed below reviewed      Impression: This is a 58-year-old female with known history of relapsing remitting multiple sclerosis, epilepsy, migraines and prior optic neuritis who presented to [facility] 9/2 due to eye pain and \"worst headache of her life\".  Chart review reflects that patient's had headache ongoing for the past 6 months with increased intensity and frequency along with associated blurring of vision.  Since admission patient became acutely encephalopathic and has remained confused requiring soft wrist restraints.  Psychiatry has been following-Haldol added to assist with behavior.  LP completed this admission which showed elevated protein and total nucleated cells.  Infectious disease following-additional CSF studies have been added.  Cryptococcal antigen/RPR/HIV/meningitis/encephalopathy panel negative/nonreactive.  CSF culture no growth in 48 hours.  9/3 MRI of the brain negative for acute findings-T2 and postcontrast imaging unable to be obtained due to patient's inability to tolerate.Toxicology: Positive for THC.  UA 3+ blood, WBC 0-2, trace bacteria  Of note patient on Ocrevus for MS is followed by Dr. Tien Boateng with [facility].  Plan to repeat AND-[id number].    Neurologically, stable mild improvement overnight.  MRI of the brain shows no evidence of acute infarct/enhancing-nonspecific mild periventricular white matter disease noted.  Recommendations below.PT/OT/ST. CCP to assist with discharge planning. Call RRT for any acute neurological changes and/or concerns. We will continue to follow and advise.         Impression:  1.  Encephalopathy; acute  2.  New onset headache with associated left orbital pain-normal sed rate-mildly elevated  3.  History of " multiple sclerosis; relapsing remitting type-on Ocrevus prior to arrival    Plan:  Delirium precautions  Consider steroid trial/repeat LP next week if patient does not improve-adding prion and Ig panel  ID/psychiatry follow    Case reviewed with attending neurologist Dr. Joe Carmona and he agrees with treatment plan above    SHERMAN Laguna

## 2023-09-09 NOTE — PLAN OF CARE
Goal Outcome Evaluation:   Pt A&Ox3 upon assessment, VSS, no acute changes at this time, this RN will continue to monitor

## 2023-09-09 NOTE — PROGRESS NOTES
"    Name: Annemarie Ha ADMIT: 2023   : 1965  PCP: Eula Franco APRN    MRN: [id number] LOS: 4 days   AGE/SEX: 58 y.o. female  ROOM: [facility]     Subjective   Subjective   Patient denies complaint. Lying in bed. Restrained. Family at bedside states compared to when today she is much improved. They feel she has \"turned the corner\". She is eating some food.     Objective   Objective   Vital Signs  Temp:  [98.1 °F (36.7 °C)-98.6 °F (37 °C)] 98.1 °F (36.7 °C)  Heart Rate:  [74-88] 74  Resp:  [16-18] 16  BP: (116-151)/(73-87) 147/81  SpO2:  [99 %-100 %] 100 %  on   ;   Device (Oxygen Therapy): room air  Body mass index is 42.36 kg/m².    Physical Exam  Constitutional:       General: She is not in acute distress.     Appearance: She is not toxic-appearing.      Interventions: She is restrained.   HENT:      Head: Normocephalic and atraumatic.   Eyes:      Extraocular Movements: Extraocular movements intact.   Cardiovascular:      Rate and Rhythm: Normal rate and regular rhythm.   Pulmonary:      Effort: Pulmonary effort is normal. No respiratory distress.      Breath sounds: Normal breath sounds. No wheezing or rhonchi.   Abdominal:      General: Bowel sounds are normal.      Palpations: Abdomen is soft.      Tenderness: There is no abdominal tenderness. There is no guarding or rebound.   Musculoskeletal:         General: No swelling.      Cervical back: Normal range of motion.   Skin:     General: Skin is warm and dry.   Neurological:      Mental Status: She is alert.      Comments: oriented to self, hospital, year. not situation   Psychiatric:         Behavior: Behavior is not agitated or aggressive.     Results Review  I reviewed the patient's new clinical results.  Results from last 7 days   Lab Units 23  0426 23  0503 23  0642 23  0504   WBC 10*3/mm3 5.62 5.27 6.07 7.70   HEMOGLOBIN g/dL 13.6 13.2 13.1 12.9   PLATELETS 10*3/mm3 378 367 352 320     Results from last 7 " days   Lab Units 09/09/23  0426 09/08/23  0503 09/07/23  0642 09/06/23  0504   SODIUM mmol/L 138 139 138 136   POTASSIUM mmol/L 3.9 3.8 4.5 3.8   CHLORIDE mmol/L 104 103 101 100   CO2 mmol/L 21.6* 20.4* 22.8 19.0*   BUN mg/dL 12 9 10 11   CREATININE mg/dL 0.98 0.87 0.98 0.95   GLUCOSE mg/dL 100* 101* 104* 116*     Lab Results   Component Value Date    ANIONGAP 12.4 09/09/2023     Estimated Creatinine Clearance: 78.7 mL/min (by C-G formula based on SCr of 0.98 mg/dL).   Lab Results   Component Value Date    EGFR 66.2 09/09/2023         Results from last 7 days   Lab Units 09/09/23  0426 09/08/23  0503 09/07/23  0642 09/06/23  0504 09/03/23  0515 09/02/23  1645   CALCIUM mg/dL 9.4 9.2 9.9 9.5   < >  --    MAGNESIUM mg/dL  --   --   --   --   --  2.2    < > = values in this interval not displayed.       No results found for: HGBA1C, POCGLU    MRI Brain With & Without Contrast    Result Date: 9/9/2023  Mild nonspecific periventricular white matter disease, as described, correlate clinically. No acute infarct. No enhancing lesion of brain.   This report was finalized on 9/9/2023 12:08 PM by Dr. Kris Mcknight M.D.      IR LUMBAR PUNCTURE DIAGNOSTIC    Result Date: 9/7/2023  Technically successful fluoroscopically guided lumbar puncture.  Reference air kerma: 18.7 mGy  This report was finalized on 9/7/2023 3:40 PM by Dr. Camilo Aguilar M.D.       Scheduled Meds  aspirin, 81 mg, Oral, Daily  cloNIDine, 0.1 mg, Oral, Nightly  enoxaparin, 40 mg, Subcutaneous, Q12H  famotidine, 40 mg, Oral, Daily  haloperidol, 1 mg, Oral, TID  lamoTRIgine, 200 mg, Oral, BID  magnesium oxide, 400 mg, Oral, Daily  potassium chloride, 10 mEq, Oral, Daily  senna-docusate sodium, 2 tablet, Oral, BID  sodium chloride, 10 mL, Intravenous, Q12H  topiramate, 50 mg, Oral, Nightly  vitamin B-12, 1,000 mcg, Oral, Daily  Vitamin B-2, 400 mg, Oral, Daily  vitamin D, 50,000 Units, Oral, Weekly    Continuous Infusions  hold, 1 each  sodium chloride, 100  mL/hr, Last Rate: 100 mL/hr (09/08/23 0815)    PRN Meds    acetaminophen **OR** acetaminophen **OR** acetaminophen    albuterol sulfate HFA    senna-docusate sodium **AND** polyethylene glycol **AND** bisacodyl **AND** bisacodyl    Calcium Replacement - Follow Nurse / BPA Driven Protocol    diphenhydrAMINE    haloperidol lactate    hold    HYDROcodone-acetaminophen    loperamide    Magnesium Standard Dose Replacement - Follow Nurse / BPA Driven Protocol    nitroglycerin    ondansetron **OR** ondansetron    Phosphorus Replacement - Follow Nurse / BPA Driven Protocol    Potassium Replacement - Follow Nurse / BPA Driven Protocol    [COMPLETED] Insert Peripheral IV **AND** sodium chloride    sodium chloride    sodium chloride    ziprasidone    hold, 1 each  sodium chloride, 100 mL/hr, Last Rate: 100 mL/hr (09/08/23 0815)    Diet  Diet: Regular/House Diet; Texture: Regular Texture (IDDSI 7); Fluid Consistency: Thin (IDDSI 0)    I have personally reviewed:  [x]  Medications  [x]  Laboratory   []  Microbiology   [x]  Radiology   []  EKG/Telemetry   []  Cardiology/Vascular   []  Pathology   []  Records      Assessment/Plan     Active Hospital Problems    Diagnosis  POA    **Migraine [G43.909]  Yes    Tetrahydrocannabinol (THC) use disorder, mild, abuse [F12.10]  Yes    Glucose intolerance [E74.39]  Yes    Chronic pain syndrome [G89.4]  Yes    Opioid dependence [F11.20]  Yes    Mood disorder [F39]  Yes    Left-sided headache [R51.9]  Yes    Obstructive sleep apnea [G47.33]  Yes    Dyslipidemia [E78.5]  Yes      Resolved Hospital Problems    Diagnosis Date Resolved POA    Hypokalemia [E87.6] 09/06/2023 Yes     58 y.o. female  female admitted with Migraine.     Left-sided headache in a patient with a history of MS/seizure activity.    Encephalopathy   PTSD/personality disorder/mood disorder  Headache improved on Topamax/migraine medication. CNS examination is negative. CT scan of the brain without contrast is negative.  Negative CTA of the brain except for surgical changes of the right orbit floor. Negative MRI of the brain. Positive family history of migraine. No seizure activity no clinical evidence of temporal arteritis with normal ESR and only mildly elevated CRP. Status post LP 9/7/2023: Meningitis panel negative and culture no growth at 2 days. Neurology and psychiatry and ID following. Delirium precautions. Family feels is improved today trying out of restraints. MRI today no acute infarct no enhancing lesion. Neurology considering steroid trial. She is eating some we will stop IVF    THC use.   Hypokalemia. Resolved.  Hyperglycemia.  History of glucose intolerance.  A1c is normal at 5.  DDD/generalized osteoarthritis/chronic pain syndrome/opioid dependence.  Long term hydrocodone use. Hydrocodone is now as needed  Obstructive sleep apnea.  Continue CPAP.  Dyslipidemia.  Continue Lipitor.    Lovenox 40 mg SC daily for DVT prophylaxis    Discussed with patient, family, and nursing staff    Discharge: LILIA Reynoso MD  [facility]  09/09/23

## 2023-09-09 NOTE — PROGRESS NOTES
"Patient is seen, evaluated, and chart reviewed. Discussed with staff.      Staff reports that patient has been cooperative and compliant with medications.  Patient continues to require restraints for safety.  No other behavioral issues.    On examination, patient is found lying in bed.  A friend is at bedside.  Bilateral wrist restraints are in place.  Patient is alert and oriented x 2.  Patient slept fair last night.  Mood is \"good.\"  Affect congruent.  No SI/HI/AVH.  Thought processes are slowed.  Judgment and insight are impaired.    No medication side effects.  The patient's friend reports that she believes the patient is \"much better.\"  No further changes at this time.  "

## 2023-09-10 LAB
ANION GAP SERPL CALCULATED.3IONS-SCNC: 12.5 MMOL/L (ref 5–15)
BACTERIA SPEC AEROBE CULT: NORMAL
BASOPHILS # BLD AUTO: 0.03 10*3/MM3 (ref 0–0.2)
BASOPHILS NFR BLD AUTO: 0.6 % (ref 0–1.5)
BUN SERPL-MCNC: 12 MG/DL (ref 8–23)
BUN/CREAT SERPL: 13 (ref 7–25)
CALCIUM SPEC-SCNC: 9.8 MG/DL (ref 8.6–10.5)
CHLORIDE SERPL-SCNC: 105 MMOL/L (ref 98–107)
CO2 SERPL-SCNC: 21.5 MMOL/L (ref 22–29)
CREAT SERPL-MCNC: 0.92 MG/DL (ref 0.57–1)
DEPRECATED RDW RBC AUTO: 39.8 FL (ref 37–54)
EGFRCR SERPLBLD CKD-EPI 2021: 71.4 ML/MIN/1.73
EOSINOPHIL # BLD AUTO: 0.15 10*3/MM3 (ref 0–0.4)
EOSINOPHIL NFR BLD AUTO: 3 % (ref 0.3–6.2)
ERYTHROCYTE [DISTWIDTH] IN BLOOD BY AUTOMATED COUNT: 12.2 % (ref 12.3–15.4)
GLUCOSE SERPL-MCNC: 102 MG/DL (ref 65–99)
GRAM STN SPEC: NORMAL
GRAM STN SPEC: NORMAL
HCT VFR BLD AUTO: 40.2 % (ref 34–46.6)
HGB BLD-MCNC: 13.6 G/DL (ref 12–15.9)
IMM GRANULOCYTES # BLD AUTO: 0.09 10*3/MM3 (ref 0–0.05)
IMM GRANULOCYTES NFR BLD AUTO: 1.8 % (ref 0–0.5)
LYMPHOCYTES # BLD AUTO: 0.89 10*3/MM3 (ref 0.7–3.1)
LYMPHOCYTES NFR BLD AUTO: 17.9 % (ref 19.6–45.3)
MCH RBC QN AUTO: 30.2 PG (ref 26.6–33)
MCHC RBC AUTO-ENTMCNC: 33.8 G/DL (ref 31.5–35.7)
MCV RBC AUTO: 89.1 FL (ref 79–97)
MONOCYTES # BLD AUTO: 0.57 10*3/MM3 (ref 0.1–0.9)
MONOCYTES NFR BLD AUTO: 11.4 % (ref 5–12)
NEUTROPHILS NFR BLD AUTO: 3.25 10*3/MM3 (ref 1.7–7)
NEUTROPHILS NFR BLD AUTO: 65.3 % (ref 42.7–76)
NRBC BLD AUTO-RTO: 0 /100 WBC (ref 0–0.2)
PLATELET # BLD AUTO: 410 10*3/MM3 (ref 140–450)
PMV BLD AUTO: 9.8 FL (ref 6–12)
POTASSIUM SERPL-SCNC: 4 MMOL/L (ref 3.5–5.2)
RBC # BLD AUTO: 4.51 10*6/MM3 (ref 3.77–5.28)
SODIUM SERPL-SCNC: 139 MMOL/L (ref 136–145)
WBC NRBC COR # BLD: 4.98 10*3/MM3 (ref 3.4–10.8)

## 2023-09-10 PROCEDURE — 99233 SBSQ HOSP IP/OBS HIGH 50: CPT | Performed by: INTERNAL MEDICINE

## 2023-09-10 PROCEDURE — 99232 SBSQ HOSP IP/OBS MODERATE 35: CPT | Performed by: PSYCHIATRY & NEUROLOGY

## 2023-09-10 PROCEDURE — 25010000002 ENOXAPARIN PER 10 MG: Performed by: INTERNAL MEDICINE

## 2023-09-10 PROCEDURE — 80048 BASIC METABOLIC PNL TOTAL CA: CPT | Performed by: INTERNAL MEDICINE

## 2023-09-10 PROCEDURE — 85025 COMPLETE CBC W/AUTO DIFF WBC: CPT | Performed by: INTERNAL MEDICINE

## 2023-09-10 RX ADMIN — ENOXAPARIN SODIUM 40 MG: 100 INJECTION SUBCUTANEOUS at 22:06

## 2023-09-10 RX ADMIN — Medication 10 ML: at 23:22

## 2023-09-10 RX ADMIN — ASPIRIN 81 MG: 81 TABLET, COATED ORAL at 08:08

## 2023-09-10 RX ADMIN — CLONIDINE HYDROCHLORIDE 0.1 MG: 0.1 TABLET ORAL at 22:06

## 2023-09-10 RX ADMIN — FAMOTIDINE 40 MG: 20 TABLET, FILM COATED ORAL at 08:08

## 2023-09-10 RX ADMIN — ENOXAPARIN SODIUM 40 MG: 100 INJECTION SUBCUTANEOUS at 08:08

## 2023-09-10 RX ADMIN — TOPIRAMATE 50 MG: 50 TABLET, FILM COATED ORAL at 22:06

## 2023-09-10 RX ADMIN — HALOPERIDOL 1 MG: 1 TABLET ORAL at 08:08

## 2023-09-10 RX ADMIN — POTASSIUM CHLORIDE 10 MEQ: 750 TABLET, EXTENDED RELEASE ORAL at 08:08

## 2023-09-10 RX ADMIN — HALOPERIDOL 1 MG: 1 TABLET ORAL at 15:40

## 2023-09-10 RX ADMIN — MAGNESIUM OXIDE 400 MG (241.3 MG MAGNESIUM) TABLET 400 MG: TABLET at 08:08

## 2023-09-10 RX ADMIN — SENNOSIDES AND DOCUSATE SODIUM 2 TABLET: 50; 8.6 TABLET ORAL at 08:08

## 2023-09-10 RX ADMIN — Medication 400 MG: at 08:50

## 2023-09-10 RX ADMIN — Medication 1000 MCG: at 08:08

## 2023-09-10 RX ADMIN — LAMOTRIGINE 200 MG: 100 TABLET ORAL at 22:05

## 2023-09-10 RX ADMIN — LAMOTRIGINE 200 MG: 100 TABLET ORAL at 08:08

## 2023-09-10 NOTE — PROGRESS NOTES
Name: Annemarie Ha ADMIT: 2023   : 1965  PCP: Eula Franco APRN    MRN: [id number] LOS: 5 days   AGE/SEX: 58 y.o. female  ROOM: [facility]     Subjective   Subjective   Sitting up in chair. Out of restraints. Overall improved. Family at bedside. She is eating some but needs a lot of encouragement     Objective   Objective   Vital Signs  Temp:  [97.5 °F (36.4 °C)-98.2 °F (36.8 °C)] 98.1 °F (36.7 °C)  Heart Rate:  [] 103  Resp:  [16] 16  BP: (108-141)/(68-94) 141/94  SpO2:  [99 %-100 %] 99 %  on   ;   Device (Oxygen Therapy): room air  Body mass index is 42.36 kg/m².    Physical Exam  Constitutional:       General: She is not in acute distress.     Appearance: She is not toxic-appearing.   HENT:      Head: Normocephalic and atraumatic.   Eyes:      Extraocular Movements: Extraocular movements intact.   Cardiovascular:      Rate and Rhythm: Normal rate and regular rhythm.   Pulmonary:      Effort: Pulmonary effort is normal. No respiratory distress.      Breath sounds: Normal breath sounds. No wheezing or rhonchi.   Abdominal:      General: Bowel sounds are normal.      Palpations: Abdomen is soft.      Tenderness: There is no abdominal tenderness. There is no guarding or rebound.   Musculoskeletal:         General: No swelling.      Cervical back: Normal range of motion.   Skin:     General: Skin is warm and dry.   Neurological:      Mental Status: She is alert.   Psychiatric:         Behavior: Behavior is not agitated or aggressive.     Results Review  I reviewed the patient's new clinical results.  Results from last 7 days   Lab Units 09/10/23  0643 23  0426 23  0503 23  0642   WBC 10*3/mm3 4.98 5.62 5.27 6.07   HEMOGLOBIN g/dL 13.6 13.6 13.2 13.1   PLATELETS 10*3/mm3 410 378 367 352       Results from last 7 days   Lab Units 09/10/23  0643 23  0426 23  0503 23  0642   SODIUM mmol/L 139 138 139 138   POTASSIUM mmol/L 4.0 3.9 3.8 4.5   CHLORIDE  mmol/L 105 104 103 101   CO2 mmol/L 21.5* 21.6* 20.4* 22.8   BUN mg/dL 12 12 9 10   CREATININE mg/dL 0.92 0.98 0.87 0.98   GLUCOSE mg/dL 102* 100* 101* 104*       Lab Results   Component Value Date    ANIONGAP 12.5 09/10/2023     Estimated Creatinine Clearance: 83.9 mL/min (by C-G formula based on SCr of 0.92 mg/dL).   Lab Results   Component Value Date    EGFR 71.4 09/10/2023         Results from last 7 days   Lab Units 09/10/23  0643 09/09/23  0426 09/08/23  0503 09/07/23  0642   CALCIUM mg/dL 9.8 9.4 9.2 9.9         No results found for: HGBA1C, POCGLU    MRI Brain With & Without Contrast    Result Date: 9/9/2023  Mild nonspecific periventricular white matter disease, as described, correlate clinically. No acute infarct. No enhancing lesion of brain.   This report was finalized on 9/9/2023 12:08 PM by Dr. Kris Mcknight M.D.       Scheduled Meds  aspirin, 81 mg, Oral, Daily  cloNIDine, 0.1 mg, Oral, Nightly  enoxaparin, 40 mg, Subcutaneous, Q12H  famotidine, 40 mg, Oral, Daily  haloperidol, 1 mg, Oral, TID  lamoTRIgine, 200 mg, Oral, BID  magnesium oxide, 400 mg, Oral, Daily  potassium chloride, 10 mEq, Oral, Daily  senna-docusate sodium, 2 tablet, Oral, BID  sodium chloride, 10 mL, Intravenous, Q12H  topiramate, 50 mg, Oral, Nightly  vitamin B-12, 1,000 mcg, Oral, Daily  Vitamin B-2, 400 mg, Oral, Daily  vitamin D, 50,000 Units, Oral, Weekly    Continuous Infusions  hold, 1 each    PRN Meds  •  acetaminophen **OR** acetaminophen **OR** acetaminophen  •  albuterol sulfate HFA  •  senna-docusate sodium **AND** polyethylene glycol **AND** bisacodyl **AND** bisacodyl  •  Calcium Replacement - Follow Nurse / BPA Driven Protocol  •  diphenhydrAMINE  •  haloperidol lactate  •  hold  •  HYDROcodone-acetaminophen  •  loperamide  •  Magnesium Standard Dose Replacement - Follow Nurse / BPA Driven Protocol  •  nitroglycerin  •  ondansetron **OR** ondansetron  •  Phosphorus Replacement - Follow Nurse / BPA Driven  Protocol  •  Potassium Replacement - Follow Nurse / BPA Driven Protocol  •  [COMPLETED] Insert Peripheral IV **AND** sodium chloride  •  sodium chloride  •  sodium chloride  •  ziprasidone    hold, 1 each    Diet  Diet: Regular/House Diet; Texture: Regular Texture (IDDSI 7); Fluid Consistency: Thin (IDDSI 0)    I have personally reviewed:  [x]  Medications  [x]  Laboratory   [x]  Microbiology   []  Radiology   []  EKG/Telemetry   []  Cardiology/Vascular   []  Pathology   []  Records      Assessment/Plan     Active Hospital Problems    Diagnosis  POA   • **Migraine [G43.909]  Yes   • Tetrahydrocannabinol (THC) use disorder, mild, abuse [F12.10]  Yes   • Glucose intolerance [E74.39]  Yes   • Chronic pain syndrome [G89.4]  Yes   • Opioid dependence [F11.20]  Yes   • Mood disorder [F39]  Yes   • Left-sided headache [R51.9]  Yes   • Obstructive sleep apnea [G47.33]  Yes   • Dyslipidemia [E78.5]  Yes      Resolved Hospital Problems    Diagnosis Date Resolved POA   • Hypokalemia [E87.6] 09/06/2023 Yes     58 y.o. female  female admitted with Migraine.     Left-sided headache in a patient with a history of MS/seizure activity.    Encephalopathy   PTSD/personality disorder/mood disorder  Headache improved on Topamax/migraine medication. CNS examination is negative. CT scan of the brain without contrast is negative. Negative CTA of the brain except for surgical changes of the right orbit floor. Negative MRI of the brain. Positive family history of migraine. No seizure activity no clinical evidence of temporal arteritis with normal ESR and only mildly elevated CRP. Status post LP 9/7/2023: Meningitis panel negative and culture no growth at 2 days. Neurology and psychiatry and ID following. Delirium precautions. Repeat MRI yesterday unremarkable.     Seems to be improving. She is out of restraints. She is eating some. Discussed with neurology possible viral encephalitis and since she is improving hold off on another trial of  steroids. Recommends eventual follow-up with neuro immunology at [facility].    THC use.   Hypokalemia. Resolved.  Hyperglycemia.  History of glucose intolerance.  A1c is normal at 5.  DDD/generalized osteoarthritis/chronic pain syndrome/opioid dependence.  Long term hydrocodone use. Hydrocodone is now as needed  Obstructive sleep apnea.  Continue CPAP.  Dyslipidemia.  Continue Lipitor.    Lovenox 40 mg SC daily for DVT prophylaxis    Discussed with patient, family, and nursing staff and Dr. Carmona    Discharge: TBD. Probably will need SNF    [facility] Noam Reynoso MD  [facility]  09/10/23

## 2023-09-10 NOTE — PROGRESS NOTES
"DOS: 9/10/2023  NAME: Annemarie Ha   : 1965  PCP: Eula Franco APRN  Chief Complaint   Patient presents with    Headache     Worst HA of Life       Chief complaint: Altered mental status  Subjective: Mental status continues to trend better without any specific neurologic treatment    Objective:  Vital signs: /94 (BP Location: Right arm, Patient Position: Lying)   Pulse 103   Temp 98.1 °F (36.7 °C) (Oral)   Resp 16   Ht 166.4 cm (65.5\")   Wt 117 kg (258 lb 8 oz)   SpO2 99%   BMI 42.36 kg/m²    Gen: NAD, vitals reviewed  MS: Oriented x2, memory impaired, mildly impaired attention/concentration, language intact, no neglect  CN: visual acuity grossly normal, PERRL, EOMI, no facial droop, mild dysarthria  Motor: 5/5 throughout upper and lower extremities, normal tone  Sensory: intact to light touch all 4 ext.    Laboratory results:  Lab Results   Component Value Date    GLUCOSE 102 (H) 09/10/2023    CALCIUM 9.8 09/10/2023     09/10/2023    K 4.0 09/10/2023    CO2 21.5 (L) 09/10/2023     09/10/2023    BUN 12 09/10/2023    CREATININE 0.92 09/10/2023    EGFRIFAFRI 92 2016    EGFRIFNONA 77 2017    BCR 13.0 09/10/2023    ANIONGAP 12.5 09/10/2023     Lab Results   Component Value Date    WBC 4.98 09/10/2023    HGB 13.6 09/10/2023    HCT 40.2 09/10/2023    MCV 89.1 09/10/2023     09/10/2023     Lab Results   Component Value Date     (H) 2019     (H) 2017     (H) 2017            Review of labs: CBC, BMP unremarkable    Review and interpretation of imaging: I personally reviewed her contrasted brain MRI performed yesterday which shows no enhancing lesions, no change to her prior brain MRI this admission.  She has a single small area of subcortical white matter disease on the right side    Diagnoses:  Reported history of relapsing remitting multiple sclerosis  Encephalitis, viral versus autoimmune, improving with supportive " care  New onset headache, resolved  Encephalopathy, improving    Comment: Neurologically improving.  Working diagnosis is viral encephalitis which may have accounted for her presenting headache.  There is a chance given some aspects of her mental status that this is autoimmune as well.  She will need continued clinical surveillance by neurology in the outpatient setting.  Of note she does follow with Dr. Tien Boateng with [facility] neuro immunology.    Plan:  1.  I do not plan to initiate high-dose steroids for now.  If this is viral encephalitis she should gradually improve  2.  Close neurologic follow-up in the outpatient setting    Management discussed with Dr. Reynoso

## 2023-09-10 NOTE — PROGRESS NOTES
ID note for encephalitis    S: She seems to be improving per others.  Afebrile.    Vital Signs   Temp:  [97.5 °F (36.4 °C)-98.2 °F (36.8 °C)] 98.1 °F (36.7 °C)  Heart Rate:  [] 103  Resp:  [16] 16  BP: (108-141)/(68-94) 141/94    Physical Exam:   GENERAL: Awake and alert, sickly appearing  HEENT: Oropharynx is clear. Hearing is grossly normal.   EYES: . No conjunctival injection. No lid lag.   LUNGS:normal respiratory effort.   SKIN: no cutaneous eruptions in exposed areas  PSYCHIATRIC: Following commands for me    Labs:   Wbc 4.98, hemoglobin 13.6, platelets 410  Cr 0.92    LP results  167 nucleated cells (98% PMNs)  5 RBCs  Glc 61  Pro 91  PCR negative    Microbiology:  9/2 RPP: negative  9/2 BCx: negative  9/7 CSF PCR: negative  9/7 CSF Cx: NGTD      ASSESSMENT/PLAN:  Encephalopathy  CSF pleocytosis (lymphocytic predominant)  Multiple sclerosis on Ocrevus  Morbid obesity BMI 42  Headache      ID work up back so far HIV, RPR, Crypto Ag neg; Toxo Ab, WNV Ab, CSF fungus culture, and CSF AFB culture pending; CSF WNV, CSF Crypto, CSF Toxo ordered but not collected     Repeat MRI brain with and without contrast showed some nonspecific white matter changes for which radiology laid out pretty extensive diagnostic differential.  I discussed with Dr. Carmona and he felt like the MRI was actually pretty stable and not impressive.  She seems to be improving without any infectious intervention.  We will follow-up the pending studies

## 2023-09-10 NOTE — PLAN OF CARE
Goal Outcome Evaluation:         Patient alert and oriented x3 at beginning of shift.  After waking at about 0430, patient became confused of location, reoriented.  No acute events overnight at this time.  Patient had tremors at beginning of shift that subsided after waking.  Will continue to monitor and provide care as appropriate and ordered.  Bed in lowest position, call bell and personal items in reach.  Call bell present.

## 2023-09-11 LAB
ANION GAP SERPL CALCULATED.3IONS-SCNC: 14.3 MMOL/L (ref 5–15)
BASOPHILS # BLD AUTO: 0.03 10*3/MM3 (ref 0–0.2)
BASOPHILS NFR BLD AUTO: 0.5 % (ref 0–1.5)
BUN SERPL-MCNC: 13 MG/DL (ref 8–23)
BUN/CREAT SERPL: 13 (ref 7–25)
CALCIUM SPEC-SCNC: 9.9 MG/DL (ref 8.6–10.5)
CHLORIDE SERPL-SCNC: 106 MMOL/L (ref 98–107)
CO2 SERPL-SCNC: 17.7 MMOL/L (ref 22–29)
CREAT SERPL-MCNC: 1 MG/DL (ref 0.57–1)
CYTO UR: NORMAL
DEPRECATED RDW RBC AUTO: 40.8 FL (ref 37–54)
EGFRCR SERPLBLD CKD-EPI 2021: 64.6 ML/MIN/1.73
EOSINOPHIL # BLD AUTO: 0.19 10*3/MM3 (ref 0–0.4)
EOSINOPHIL NFR BLD AUTO: 3.1 % (ref 0.3–6.2)
ERYTHROCYTE [DISTWIDTH] IN BLOOD BY AUTOMATED COUNT: 12.2 % (ref 12.3–15.4)
GLUCOSE SERPL-MCNC: 97 MG/DL (ref 65–99)
HCT VFR BLD AUTO: 43.1 % (ref 34–46.6)
HGB BLD-MCNC: 14.5 G/DL (ref 12–15.9)
IMM GRANULOCYTES # BLD AUTO: 0.07 10*3/MM3 (ref 0–0.05)
IMM GRANULOCYTES NFR BLD AUTO: 1.2 % (ref 0–0.5)
LAB AP CASE REPORT: NORMAL
LYMPHOCYTES # BLD AUTO: 0.98 10*3/MM3 (ref 0.7–3.1)
LYMPHOCYTES NFR BLD AUTO: 16.1 % (ref 19.6–45.3)
MCH RBC QN AUTO: 31 PG (ref 26.6–33)
MCHC RBC AUTO-ENTMCNC: 33.6 G/DL (ref 31.5–35.7)
MCV RBC AUTO: 92.1 FL (ref 79–97)
MONOCYTES # BLD AUTO: 0.64 10*3/MM3 (ref 0.1–0.9)
MONOCYTES NFR BLD AUTO: 10.5 % (ref 5–12)
NEUTROPHILS NFR BLD AUTO: 4.17 10*3/MM3 (ref 1.7–7)
NEUTROPHILS NFR BLD AUTO: 68.6 % (ref 42.7–76)
NRBC BLD AUTO-RTO: 0 /100 WBC (ref 0–0.2)
PATH REPORT.FINAL DX SPEC: NORMAL
PATH REPORT.GROSS SPEC: NORMAL
PLATELET # BLD AUTO: 416 10*3/MM3 (ref 140–450)
PMV BLD AUTO: 9.9 FL (ref 6–12)
POTASSIUM SERPL-SCNC: 4.4 MMOL/L (ref 3.5–5.2)
RBC # BLD AUTO: 4.68 10*6/MM3 (ref 3.77–5.28)
SODIUM SERPL-SCNC: 138 MMOL/L (ref 136–145)
WBC NRBC COR # BLD: 6.08 10*3/MM3 (ref 3.4–10.8)

## 2023-09-11 PROCEDURE — 25010000002 DIPHENHYDRAMINE PER 50 MG: Performed by: SPECIALIST

## 2023-09-11 PROCEDURE — 80048 BASIC METABOLIC PNL TOTAL CA: CPT | Performed by: INTERNAL MEDICINE

## 2023-09-11 PROCEDURE — 85025 COMPLETE CBC W/AUTO DIFF WBC: CPT | Performed by: INTERNAL MEDICINE

## 2023-09-11 PROCEDURE — 25010000002 ENOXAPARIN PER 10 MG: Performed by: INTERNAL MEDICINE

## 2023-09-11 PROCEDURE — 97530 THERAPEUTIC ACTIVITIES: CPT

## 2023-09-11 PROCEDURE — 99232 SBSQ HOSP IP/OBS MODERATE 35: CPT | Performed by: INTERNAL MEDICINE

## 2023-09-11 RX ADMIN — HALOPERIDOL 1 MG: 1 TABLET ORAL at 21:52

## 2023-09-11 RX ADMIN — HALOPERIDOL 1 MG: 1 TABLET ORAL at 16:10

## 2023-09-11 RX ADMIN — SENNOSIDES AND DOCUSATE SODIUM 2 TABLET: 50; 8.6 TABLET ORAL at 08:41

## 2023-09-11 RX ADMIN — FAMOTIDINE 40 MG: 20 TABLET, FILM COATED ORAL at 08:42

## 2023-09-11 RX ADMIN — MAGNESIUM OXIDE 400 MG (241.3 MG MAGNESIUM) TABLET 400 MG: TABLET at 08:41

## 2023-09-11 RX ADMIN — CLONIDINE HYDROCHLORIDE 0.1 MG: 0.1 TABLET ORAL at 21:52

## 2023-09-11 RX ADMIN — ENOXAPARIN SODIUM 40 MG: 100 INJECTION SUBCUTANEOUS at 21:52

## 2023-09-11 RX ADMIN — TOPIRAMATE 50 MG: 50 TABLET, FILM COATED ORAL at 21:52

## 2023-09-11 RX ADMIN — ENOXAPARIN SODIUM 40 MG: 100 INJECTION SUBCUTANEOUS at 08:41

## 2023-09-11 RX ADMIN — Medication 400 MG: at 08:41

## 2023-09-11 RX ADMIN — POTASSIUM CHLORIDE 10 MEQ: 750 TABLET, EXTENDED RELEASE ORAL at 08:41

## 2023-09-11 RX ADMIN — LAMOTRIGINE 200 MG: 100 TABLET ORAL at 21:52

## 2023-09-11 RX ADMIN — Medication 10 ML: at 08:42

## 2023-09-11 RX ADMIN — LAMOTRIGINE 200 MG: 100 TABLET ORAL at 08:41

## 2023-09-11 RX ADMIN — ASPIRIN 81 MG: 81 TABLET, COATED ORAL at 08:41

## 2023-09-11 RX ADMIN — HALOPERIDOL 1 MG: 1 TABLET ORAL at 08:42

## 2023-09-11 RX ADMIN — Medication 1000 MCG: at 08:41

## 2023-09-11 RX ADMIN — Medication 10 ML: at 21:54

## 2023-09-11 RX ADMIN — DIPHENHYDRAMINE HYDROCHLORIDE 25 MG: 50 INJECTION, SOLUTION INTRAMUSCULAR; INTRAVENOUS at 21:53

## 2023-09-11 NOTE — PROGRESS NOTES
"ID Note    CC: f/u encephalitis    Subj: She is much improved compared to when I last saw her on 9/8/23. No fever. She feels like she is at least 75% back to normal. She would like to reduce her Haldol. I reviewed her psychiatrist's note which mentions the episode could have been due to a significant psychiatric component.     S: She seems to be improving per others.  Afebrile.    Vital Signs   Temp:  [98.2 °F (36.8 °C)-99.1 °F (37.3 °C)] 98.2 °F (36.8 °C)  Heart Rate:  [] 89  Resp:  [16-18] 18  BP: (139-147)/(71-97) 140/80    Physical Exam:   GENERAL: Awake and alert, sitting in chair, putting on chapstick and watching TV  EYES:  No scleral icterus  LUNGS: no wheezing  Neuro: 5/5 symmetric strength  PSYCHIATRIC: calm, pleasant, and conversational    Labs:   CBC, BMP  Lab Results   Component Value Date    WBC 6.08 09/11/2023    HGB 14.5 09/11/2023    HCT 43.1 09/11/2023    MCV 92.1 09/11/2023     09/11/2023     Lab Results   Component Value Date    GLUCOSE 97 09/11/2023    CALCIUM 9.9 09/11/2023     09/11/2023    K 4.4 09/11/2023    CO2 17.7 (L) 09/11/2023     09/11/2023    BUN 13 09/11/2023    CREATININE 1.00 09/11/2023    EGFR 64.6 09/11/2023    BCR 13.0 09/11/2023    ANIONGAP 14.3 09/11/2023     HIV Ab negative  RPR nonreactive  WNV Ab pending  Toxo Ab pending  Serum Crypto Ag negative  CSF Toxo pending  CSF Crypto pending  CSF WNV pending    LP results  167 nucleated cells (98% PMNs)  5 RBCs  Glc 61  Pro 91  PCR negative    Microbiology:  9/2 RPP: negative  9/2 BCx: negative  9/7 CSF PCR: negative  9/7 CSF Cx: negative  9/7 CSF Fungus Cx: NGTD  9/7 CSF AFB Cx: NGTD    New Radiology:  MRI Brain: \"Mild nonspecific periventricular white matter disease, as described, correlate clinically. No acute infarct. No enhancing lesion of brain.       ASSESSMENT/PLAN:  Encephalopathy - better  CSF pleocytosis (lymphocytic predominant)  Multiple sclerosis on Ocrevus  Morbid obesity BMI " 42  Headache    She is markedly improved compared to when I last saw her on 9/8/23. No fever. Infection workup thus far negative. A few send out lab tests still pending but I think it is a very low-percentage chance that one of these results comes back positive for anything other than prior history of exposure (ie IgG positive). She has improved w/o any anti-infective therapy.     Unclear of the inciting event. Underlying MS could have been an exacerbating factor. There is also mention that this could have had a significant psychiatric component.     Thank you for allowing me to be involved in the care of this patient. Infectious diseases will sign off at this time,  but please call me at [phone] if any further ID questions or new ID concerns.

## 2023-09-11 NOTE — CASE MANAGEMENT/SOCIAL WORK
Continued Stay Note  [facility]     Patient Name: Annemarie Ha  MRN: [id number]  Today's Date: 9/11/2023    Admit Date: 9/2/2023    Plan: skilled rehab at [facility]; [employer] precert pending.   Discharge Plan       Row Name 09/11/23 1417       Plan    Plan Comments Precert submitted and pending.  Stephanie VILLALOBOS                   Discharge Codes    No documentation.                 Expected Discharge Date and Time       Expected Discharge Date Expected Discharge Time    Sep 12, 2023               Stephanie Weston RN

## 2023-09-11 NOTE — PROGRESS NOTES
The patient appears tremendously improved.  She is out of restraints fully oriented pleasant cooperative and exhibiting no slowing of mentation.  I continue to believe that the etiology of this clinical episode may have had a significant psychiatric component.

## 2023-09-11 NOTE — PLAN OF CARE
Goal Outcome Evaluation:  Plan of Care Reviewed With: patient           Outcome Evaluation: Pt demonstrates increased activity tolerance and functional strength as she was able to increase her gait distance and required less assistance. Pt does require some assistance with bed mobility and transfers secondary to some generalized weakness. Pt also requiring multiple standing rest breaks secondary to knee pains. Pt's safety is improved with the use of a walker as RN reports pt had a near fall this AM when walking in room without an AD and stumbled on her shoe. Family is interested in SNF placement as pt would be home alone during the day and is at increased risk for falls. PT will continue to follow to address strength, mobility, and gait.      Anticipated Discharge Disposition (PT): skilled nursing facility
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
## 2023-09-11 NOTE — PLAN OF CARE
Goal Outcome Evaluation:         Patient ambulated with Rn and again with PT, remains Aox4 to questioning but continues to be untrustworthy with safety measures. Is impulsive and forgetful at times.     VSS, denies pain, and is awaiting rehab placement. RN will continue to round on patient per hospital protocol.       Problem: Fall Injury Risk  Goal: Absence of Fall and Fall-Related Injury  Outcome: Ongoing, Progressing  Intervention: Identify and Manage Contributors  Recent Flowsheet Documentation  Taken 9/11/2023 1400 by Elijah Ramey RN  Medication Review/Management: medications reviewed  Taken 9/11/2023 1200 by Elijah Ramey RN  Medication Review/Management: medications reviewed  Taken 9/11/2023 1000 by Elijah Ramey RN  Medication Review/Management: medications reviewed  Taken 9/11/2023 0800 by Elijah Ramey RN  Medication Review/Management: medications reviewed  Intervention: Promote Injury-Free Environment  Recent Flowsheet Documentation  Taken 9/11/2023 1400 by Elijah Ramey RN  Safety Promotion/Fall Prevention:   safety round/check completed   room organization consistent  Taken 9/11/2023 1200 by Elijah Ramey RN  Safety Promotion/Fall Prevention:   safety round/check completed   room organization consistent  Taken 9/11/2023 1000 by Elijah Ramey RN  Safety Promotion/Fall Prevention:   safety round/check completed   room organization consistent  Taken 9/11/2023 0800 by Elijah Ramey RN  Safety Promotion/Fall Prevention:   safety round/check completed   room organization consistent     Problem: Pain Chronic (Persistent) (Comorbidity Management)  Goal: Acceptable Pain Control and Functional Ability  Outcome: Ongoing, Progressing  Intervention: Manage Persistent Pain  Recent Flowsheet Documentation  Taken 9/11/2023 1400 by Elijah Ramey RN  Medication Review/Management: medications reviewed  Taken 9/11/2023 1200 by Elijah Ramey RN  Medication Review/Management: medications reviewed  Taken  9/11/2023 1000 by Elijah Ramey RN  Medication Review/Management: medications reviewed  Taken 9/11/2023 0800 by Elijah Ramey RN  Bowel Elimination Promotion: adequate fluid intake promoted  Medication Review/Management: medications reviewed  Intervention: Develop Pain Management Plan  Recent Flowsheet Documentation  Taken 9/11/2023 1400 by Elijah Ramey RN  Pain Management Interventions:   position adjusted   pillow support provided  Taken 9/11/2023 0800 by Elijah Ramey RN  Pain Management Interventions: position adjusted  Intervention: Optimize Psychosocial Wellbeing  Recent Flowsheet Documentation  Taken 9/11/2023 1400 by Elijah Ramey RN  Diversional Activities: television  Taken 9/11/2023 0800 by Elijah Ramey RN  Diversional Activities: television  Family/Support System Care: support provided     Problem: Adult Inpatient Plan of Care  Goal: Plan of Care Review  Outcome: Ongoing, Progressing  Goal: Patient-Specific Goal (Individualized)  Outcome: Ongoing, Progressing  Goal: Absence of Hospital-Acquired Illness or Injury  Outcome: Ongoing, Progressing  Intervention: Identify and Manage Fall Risk  Recent Flowsheet Documentation  Taken 9/11/2023 1400 by Elijah Ramey RN  Safety Promotion/Fall Prevention:   safety round/check completed   room organization consistent  Taken 9/11/2023 1200 by Elijah Ramey RN  Safety Promotion/Fall Prevention:   safety round/check completed   room organization consistent  Taken 9/11/2023 1000 by Elijah Ramey RN  Safety Promotion/Fall Prevention:   safety round/check completed   room organization consistent  Taken 9/11/2023 0800 by Elijah Ramey RN  Safety Promotion/Fall Prevention:   safety round/check completed   room organization consistent  Intervention: Prevent Skin Injury  Recent Flowsheet Documentation  Taken 9/11/2023 1400 by Elijah Ramey RN  Body Position: position changed independently  Taken 9/11/2023 0800 by Elijah Raemy RN  Body Position:  position changed independently  Skin Protection: adhesive use limited  Intervention: Prevent and Manage VTE (Venous Thromboembolism) Risk  Recent Flowsheet Documentation  Taken 9/11/2023 1400 by Elijah Ramey RN  Activity Management: ambulated in room  Range of Motion: active ROM (range of motion) encouraged  Taken 9/11/2023 0800 by Elijah Ramey RN  Activity Management: ambulated in room  Range of Motion: active ROM (range of motion) encouraged  Intervention: Prevent Infection  Recent Flowsheet Documentation  Taken 9/11/2023 1400 by Elijah Ramey RN  Infection Prevention:   single patient room provided   rest/sleep promoted  Taken 9/11/2023 1200 by Elijah Ramey RN  Infection Prevention:   single patient room provided   rest/sleep promoted  Taken 9/11/2023 1000 by Elijah Ramey RN  Infection Prevention:   single patient room provided   rest/sleep promoted  Taken 9/11/2023 0800 by Elijah Ramey RN  Infection Prevention:   single patient room provided   rest/sleep promoted  Goal: Optimal Comfort and Wellbeing  Outcome: Ongoing, Progressing  Intervention: Monitor Pain and Promote Comfort  Recent Flowsheet Documentation  Taken 9/11/2023 1400 by Elijah Ramey RN  Pain Management Interventions:   position adjusted   pillow support provided  Taken 9/11/2023 0800 by Elijah Ramey RN  Pain Management Interventions: position adjusted  Intervention: Provide Person-Centered Care  Recent Flowsheet Documentation  Taken 9/11/2023 1400 by Elijah Ramey RN  Trust Relationship/Rapport: care explained  Taken 9/11/2023 0800 by Elijah Ramey RN  Trust Relationship/Rapport: care explained  Goal: Readiness for Transition of Care  Outcome: Ongoing, Progressing     Problem: Restraint, Nonviolent  Goal: Absence of Harm or Injury  Outcome: Ongoing, Progressing  Intervention: Implement Least Restrictive Safety Strategies  Recent Flowsheet Documentation  Taken 9/11/2023 1400 by Elijah Ramey RN  Medical Device  Protection:   tubing secured   IV pole/bag removed from visual field  Diversional Activities: television  Taken 9/11/2023 1200 by Elijah Ramey RN  Medical Device Protection:   tubing secured   IV pole/bag removed from visual field  Taken 9/11/2023 1000 by Elijah Ramey RN  Medical Device Protection:   tubing secured   IV pole/bag removed from visual field  Taken 9/11/2023 0800 by Elijah Ramey RN  Medical Device Protection:   tubing secured   IV pole/bag removed from visual field  Diversional Activities: television  Intervention: Protect Dignity, Rights, and Personal Wellbeing  Recent Flowsheet Documentation  Taken 9/11/2023 1400 by Elijah Ramey RN  Trust Relationship/Rapport: care explained  Taken 9/11/2023 0800 by Elijah Ramey RN  Trust Relationship/Rapport: care explained  Intervention: Protect Skin and Joint Integrity  Recent Flowsheet Documentation  Taken 9/11/2023 1400 by Elijah Ramey RN  Body Position: position changed independently  Range of Motion: active ROM (range of motion) encouraged  Taken 9/11/2023 0800 by Elijah Ramey RN  Body Position: position changed independently  Range of Motion: active ROM (range of motion) encouraged  Goal: Absence of Harm or Injury  Outcome: Ongoing, Progressing  Intervention: Implement Least Restrictive Safety Strategies  Recent Flowsheet Documentation  Taken 9/11/2023 1400 by Elijah Ramey RN  Medical Device Protection:   tubing secured   IV pole/bag removed from visual field  Diversional Activities: television  Taken 9/11/2023 1200 by Elijah Ramey RN  Medical Device Protection:   tubing secured   IV pole/bag removed from visual field  Taken 9/11/2023 1000 by Elijah Ramey RN  Medical Device Protection:   tubing secured   IV pole/bag removed from visual field  Taken 9/11/2023 0800 by Elijah Ramey RN  Medical Device Protection:   tubing secured   IV pole/bag removed from visual field  Diversional Activities: television  Intervention: Protect  Dignity, Rights, and Personal Wellbeing  Recent Flowsheet Documentation  Taken 9/11/2023 1400 by Elijah Ramey RN  Trust Relationship/Rapport: care explained  Taken 9/11/2023 0800 by Elijah Ramey RN  Trust Relationship/Rapport: care explained  Intervention: Protect Skin and Joint Integrity  Recent Flowsheet Documentation  Taken 9/11/2023 1400 by Elijah Ramey RN  Body Position: position changed independently  Range of Motion: active ROM (range of motion) encouraged  Taken 9/11/2023 0800 by Elijah Ramey RN  Body Position: position changed independently  Range of Motion: active ROM (range of motion) encouraged     Problem: Skin Injury Risk Increased  Goal: Skin Health and Integrity  Outcome: Ongoing, Progressing  Intervention: Optimize Skin Protection  Recent Flowsheet Documentation  Taken 9/11/2023 1400 by Elijah Ramey RN  Head of Bed (HOB) Positioning: HOB at 30 degrees  Taken 9/11/2023 0800 by Elijah Ramey RN  Pressure Reduction Techniques:   frequent weight shift encouraged   weight shift assistance provided  Head of Bed (HOB) Positioning: HOB at 30 degrees  Pressure Reduction Devices:   alternating pressure pump (ADD)   pressure-redistributing mattress utilized  Skin Protection: adhesive use limited     Problem: Mobility Impairment  Goal: Optimal Mobility  Outcome: Ongoing, Progressing  Intervention: Optimize Mobility  Recent Flowsheet Documentation  Taken 9/11/2023 1400 by Elijah Ramey RN  Activity Management: ambulated in room  Assistive Device Utilized: walker  Positioning/Transfer Devices:   pillows   in use  Taken 9/11/2023 0800 by Elijah Ramey RN  Activity Management: ambulated in room  Assistive Device Utilized: walker  Positioning/Transfer Devices:   pillows   in use

## 2023-09-11 NOTE — PROGRESS NOTES
Name: Annemarie Ha ADMIT: 2023   : 1965  PCP: Eula Franco APRN    MRN: [id number] LOS: 6 days   AGE/SEX: 58 y.o. female  ROOM: [facility]     Subjective   Subjective   Resting in bed.  No family at bedside but daughter called while was in the room.  Patient denies any complaints and is feeling much better.  She denies any pain or trouble breathing.  She denies any nausea or vomiting.     Objective   Objective   Vital Signs  Temp:  [98.2 °F (36.8 °C)-99.1 °F (37.3 °C)] 98.6 °F (37 °C)  Heart Rate:  [] 80  Resp:  [16-18] 18  BP: (139-147)/(71-97) 139/79  SpO2:  [91 %-100 %] 96 %  on   ;   Device (Oxygen Therapy): room air  Body mass index is 42.36 kg/m².    Physical Exam  Constitutional:       General: She is not in acute distress.     Appearance: She is not toxic-appearing.   HENT:      Head: Normocephalic and atraumatic.   Eyes:      Extraocular Movements: Extraocular movements intact.   Cardiovascular:      Rate and Rhythm: Normal rate and regular rhythm.   Pulmonary:      Effort: Pulmonary effort is normal. No respiratory distress.      Breath sounds: Normal breath sounds. No wheezing or rhonchi.   Abdominal:      General: Bowel sounds are normal.      Palpations: Abdomen is soft.      Tenderness: There is no abdominal tenderness. There is no guarding or rebound.   Musculoskeletal:         General: No swelling.      Cervical back: Normal range of motion.   Skin:     General: Skin is warm and dry.   Neurological:      Mental Status: She is alert.   Psychiatric:         Behavior: Behavior is not agitated or aggressive.    Results Review:       I reviewed the patient's new clinical results.  Results from last 7 days   Lab Units 23  0759 09/10/23  0643 23  0426 23  0503   WBC 10*3/mm3 6.08 4.98 5.62 5.27   HEMOGLOBIN g/dL 14.5 13.6 13.6 13.2   PLATELETS 10*3/mm3 416 410 378 367     Results from last 7 days   Lab Units 23  0808 09/10/23  0643 23  042  09/08/23  0503   SODIUM mmol/L 138 139 138 139   POTASSIUM mmol/L 4.4 4.0 3.9 3.8   CHLORIDE mmol/L 106 105 104 103   CO2 mmol/L 17.7* 21.5* 21.6* 20.4*   BUN mg/dL 13 12 12 9   CREATININE mg/dL 1.00 0.92 0.98 0.87   GLUCOSE mg/dL 97 102* 100* 101*   Estimated Creatinine Clearance: 77.2 mL/min (by C-G formula based on SCr of 1 mg/dL).    Results from last 7 days   Lab Units 09/11/23  0808 09/10/23  0643 09/09/23  0426 09/08/23  0503   CALCIUM mg/dL 9.9 9.8 9.4 9.2       No results found for: HGBA1C, POCGLU    aspirin, 81 mg, Oral, Daily  cloNIDine, 0.1 mg, Oral, Nightly  enoxaparin, 40 mg, Subcutaneous, Q12H  famotidine, 40 mg, Oral, Daily  haloperidol, 1 mg, Oral, TID  lamoTRIgine, 200 mg, Oral, BID  magnesium oxide, 400 mg, Oral, Daily  potassium chloride, 10 mEq, Oral, Daily  senna-docusate sodium, 2 tablet, Oral, BID  sodium chloride, 10 mL, Intravenous, Q12H  topiramate, 50 mg, Oral, Nightly  vitamin B-12, 1,000 mcg, Oral, Daily  Vitamin B-2, 400 mg, Oral, Daily  vitamin D, 50,000 Units, Oral, Weekly      hold, 1 each    Diet: Regular/House Diet; Texture: Regular Texture (IDDSI 7); Fluid Consistency: Thin (IDDSI 0)       Assessment/Plan     Active Hospital Problems    Diagnosis  POA    **Migraine [G43.909]  Yes    Tetrahydrocannabinol (THC) use disorder, mild, abuse [F12.10]  Yes    Glucose intolerance [E74.39]  Yes    Chronic pain syndrome [G89.4]  Yes    Opioid dependence [F11.20]  Yes    Mood disorder [F39]  Yes    Left-sided headache [R51.9]  Yes    Obstructive sleep apnea [G47.33]  Yes    Dyslipidemia [E78.5]  Yes      Resolved Hospital Problems    Diagnosis Date Resolved POA    Hypokalemia [E87.6] 09/06/2023 Yes     Mrs. Brooklyn Ha is a 58 year old female who presented to the hospital with complaints of a left-sided headache in the setting of known relapsing remitting multiple sclerosis, epilepsy and migraines.  She became encephalopathic during the stay requiring intermittent need for restraints and  psychiatric evaluation.     Left-sided headache in a patient with a history of MS/seizure activity.    Encephalopathy   PTSD/personality disorder/mood disorder  Headache improved on Topamax/migraine medication. CNS examination is negative. CT scan of the brain without contrast is negative. Negative CTA of the brain except for surgical changes of the right orbit floor. Negative MRI of the brain. Positive family history of migraine. No seizure activity no clinical evidence of temporal arteritis with normal ESR and only mildly elevated CRP. Status post LP 9/7/2023: Meningitis panel negative and culture no growth at 2 days. Neurology and psychiatry and ID following. Delirium precautions. Repeat MRI 9/9 unremarkable.      Seems to be improving. She is out of restraints. Unclear inciting event but felt to be MS component as well as psych component. Neurology feels possible viral encephalitis and since she is improving plan to hold off on trial of steroids. Recommends eventual follow-up with neuro immunology at [facility].     THC use.   Hypokalemia. Resolved.  Hyperglycemia.  History of glucose intolerance.  A1c is normal at 5.  DDD/generalized osteoarthritis/chronic pain syndrome/opioid dependence.  Long term hydrocodone use. Hydrocodone is now as needed.   Obstructive sleep apnea.  Continue CPAP.  Dyslipidemia.  Continue Lipitor.     Lovenox 40 mg SC daily for DVT prophylaxis     Discussed with patient.     Discharge: SNF- likely tomorrow if precert obtained. On oral haldol. No IM as facility not accepting. Can likely start to wean haldol if okay with psych.     SHERMAN Foster  [facility]  09/11/23  11:47 EDT
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
## 2023-09-11 NOTE — PLAN OF CARE
Goal Outcome Evaluation:  Plan of Care Reviewed With: patient        Progress: improving  Outcome Evaluation: Pt seen for OT treatment focusing on functional mobilty with AD and transfers in bathroom. Pt able to ambulate in room with CGA x 1 using FWW but required mod v.c.'s throughout for safe mgt of AD. Pt able to complete toilet transfer with CGA x 1 with mod cues for safe use of AD and grab bar on L to simulate home setup. Pt pleasant but quite easily distracted by environment and required mod redirection as she perseverated on repositioning items on tray table while standing. Will con't to follow for stated goals and recommend d/c to SNF to maximize safety and fall prevention prior to return to home.      Anticipated Discharge Disposition (OT): skilled nursing facility

## 2023-09-11 NOTE — PROGRESS NOTES
Discharge Planning Assessment  [facility]     Patient Name: Annemarie Ha  MRN: [id number]  Today's Date: 9/11/2023    Admit Date: 9/2/2023    Plan: skilled rehab at [facility]; [employer] Medicare precert pending.   Discharge Needs Assessment    No documentation.                  Discharge Plan       Row Name 09/11/23 1102       Plan    Plan skilled rehab at [facility]; [employer] Medicare precert pending.    Plan Comments Spoke with patient at spouse Giovany at bedside for discharge planning. She will need skilled rehab at discharge as she is not able to care for her self, she is impulsive and a high fall risk. Spouse works outside of the home during the day and she would be alone. Spoke with Nadia/ [facility] has accepted for skilled rehab and will have a bed available tomorrow with the caveat that  the intramuscular Hadol will be discontinued,  PO Haldol is ok. Sent email to [facility]  preauthorization to start precert.  Juan Diego PIRES                  Continued Care and Services - Admitted Since 9/2/2023       Destination       Service Provider Request Status Selected Services Address Phone Fax Patient Preferred    [facility] HOME Accepted N/A [address] [facility] [id number] [phone] --    [facility] Pending - Request Sent N/A [address] [facility] [id number] [phone] --    [facility] Pending - Request Sent N/A [address] [facility] [id number] [phone] --    [facility] Declined  Cannot meet patient's needs N/A [address] [facility] [address] [phone] --    [facility] Declined  Cannot meet patient's needs, Bed not available N/A [address] [facility] [address] [phone] --                  Expected Discharge Date and Time       Expected Discharge Date Expected Discharge Time    Sep 12, 2023             Demographic Summary    No documentation.                  Functional Status    No documentation.                  Psychosocial    No documentation.                  Abuse/Neglect    No documentation.                  Legal    No documentation.                  Substance Abuse    No documentation.                  Patient Forms    No documentation.                     Gianna Brooke RN

## 2023-09-12 VITALS
WEIGHT: 258.5 LBS | HEIGHT: 66 IN | RESPIRATION RATE: 18 BRPM | SYSTOLIC BLOOD PRESSURE: 132 MMHG | DIASTOLIC BLOOD PRESSURE: 84 MMHG | OXYGEN SATURATION: 96 % | BODY MASS INDEX: 41.54 KG/M2 | HEART RATE: 84 BPM | TEMPERATURE: 97.7 F

## 2023-09-12 PROCEDURE — 25010000002 ENOXAPARIN PER 10 MG: Performed by: INTERNAL MEDICINE

## 2023-09-12 RX ORDER — POTASSIUM CHLORIDE 750 MG/1
10 TABLET, FILM COATED, EXTENDED RELEASE ORAL DAILY
Start: 2023-09-13

## 2023-09-12 RX ORDER — NALOXONE HYDROCHLORIDE 4 MG/.1ML
SPRAY NASAL
Qty: 2 EACH | Refills: 0 | Status: SHIPPED | OUTPATIENT
Start: 2023-09-12

## 2023-09-12 RX ORDER — FAMOTIDINE 40 MG/1
40 TABLET, FILM COATED ORAL DAILY
Start: 2023-09-13

## 2023-09-12 RX ORDER — HYDROCODONE BITARTRATE AND ACETAMINOPHEN 7.5; 325 MG/1; MG/1
1 TABLET ORAL EVERY 12 HOURS PRN
Qty: 3 TABLET | Refills: 0 | Status: SHIPPED | OUTPATIENT
Start: 2023-09-12

## 2023-09-12 RX ORDER — TOPIRAMATE 50 MG/1
50 TABLET, FILM COATED ORAL NIGHTLY
Start: 2023-09-12

## 2023-09-12 RX ORDER — AMOXICILLIN 250 MG
2 CAPSULE ORAL 2 TIMES DAILY
Start: 2023-09-12

## 2023-09-12 RX ADMIN — Medication 10 ML: at 08:31

## 2023-09-12 RX ADMIN — LAMOTRIGINE 200 MG: 100 TABLET ORAL at 08:29

## 2023-09-12 RX ADMIN — POTASSIUM CHLORIDE 10 MEQ: 750 TABLET, EXTENDED RELEASE ORAL at 08:29

## 2023-09-12 RX ADMIN — ENOXAPARIN SODIUM 40 MG: 100 INJECTION SUBCUTANEOUS at 08:29

## 2023-09-12 RX ADMIN — Medication 1000 MCG: at 08:29

## 2023-09-12 RX ADMIN — FAMOTIDINE 40 MG: 20 TABLET, FILM COATED ORAL at 08:29

## 2023-09-12 RX ADMIN — ASPIRIN 81 MG: 81 TABLET, COATED ORAL at 08:29

## 2023-09-12 RX ADMIN — HALOPERIDOL 1 MG: 1 TABLET ORAL at 08:30

## 2023-09-12 RX ADMIN — MAGNESIUM OXIDE 400 MG (241.3 MG MAGNESIUM) TABLET 400 MG: TABLET at 08:29

## 2023-09-12 RX ADMIN — Medication 400 MG: at 08:29

## 2023-09-12 NOTE — PLAN OF CARE
Goal Outcome Evaluation:  Plan of Care Reviewed With: patient        Progress: improving     Pt [facility] today[facility] [place]

## 2023-09-12 NOTE — PROGRESS NOTES
"Nutrition Services    Patient Name:  Annemarie Ha  YOB: 1965  MRN: [id number]  Admit Date:  9/2/2023    FOLLOW UP - CLINICAL NUTRITION    Assessment Date:  09/12/23    Encounter Information         Reason for Encounter RD f/u.     Current Issues Pt more alert today, Aox4. Reports appetite fair. Earing % of meals. Sitting up for lunch at time of visit. Declined offer for ONS. May D/C home today. RD encouraged PO intake as tolerated.     Current Nutrition Orders & Evaluation of Intake       Oral Nutrition     Current PO Diet Diet: Regular/House Diet; Texture: Regular Texture (IDDSI 7); Fluid Consistency: Thin (IDDSI 0)   Supplement n/a   PO Evaluation     % PO Intake %    # of Days Evaluated 5    Factors Affecting Intake  No factors at this time   --  Anthropometrics          Height    Weight Height: 166.4 cm (65.5\")  Weight: 117 kg (258 lb 8 oz) (09/02/23 1630)    BMI kg/m2 Body mass index is 42.36 kg/m².  Obese, Class III (40 or higher)    Weight trend Loss     Labs        Pertinent Labs Reviewed, listed below     Results from last 7 days   Lab Units 09/11/23  0808 09/10/23  0643 09/09/23  0426   SODIUM mmol/L 138 139 138   POTASSIUM mmol/L 4.4 4.0 3.9   CHLORIDE mmol/L 106 105 104   CO2 mmol/L 17.7* 21.5* 21.6*   BUN mg/dL 13 12 12   CREATININE mg/dL 1.00 0.92 0.98   CALCIUM mg/dL 9.9 9.8 9.4   GLUCOSE mg/dL 97 102* 100*       Results from last 7 days   Lab Units 09/11/23  0759   HEMOGLOBIN g/dL 14.5   HEMATOCRIT % 43.1   WBC 10*3/mm3 6.08       Results from last 7 days   Lab Units 09/11/23  0759 09/10/23  0643 09/09/23  0426 09/08/23  0503 09/07/23  0642   PLATELETS 10*3/mm3 416 410 378 367 352       COVID19   Date Value Ref Range Status   09/02/2023 Not Detected Not Detected - Ref. Range Final     Lab Results   Component Value Date    HGBA1C 5.00 09/02/2023          Medications            Scheduled Medications aspirin, 81 mg, Oral, Daily  cloNIDine, 0.1 mg, Oral, " Nightly  enoxaparin, 40 mg, Subcutaneous, Q12H  famotidine, 40 mg, Oral, Daily  lamoTRIgine, 200 mg, Oral, BID  magnesium oxide, 400 mg, Oral, Daily  potassium chloride, 10 mEq, Oral, Daily  senna-docusate sodium, 2 tablet, Oral, BID  sodium chloride, 10 mL, Intravenous, Q12H  topiramate, 50 mg, Oral, Nightly  vitamin B-12, 1,000 mcg, Oral, Daily  Vitamin B-2, 400 mg, Oral, Daily  vitamin D, 50,000 Units, Oral, Weekly        Infusions hold, 1 each        PRN Medications   acetaminophen **OR** acetaminophen **OR** acetaminophen    albuterol sulfate HFA    senna-docusate sodium **AND** polyethylene glycol **AND** bisacodyl **AND** bisacodyl    Calcium Replacement - Follow Nurse / BPA Driven Protocol    diphenhydrAMINE    hold    HYDROcodone-acetaminophen    loperamide    Magnesium Standard Dose Replacement - Follow Nurse / BPA Driven Protocol    nitroglycerin    ondansetron **OR** ondansetron    Phosphorus Replacement - Follow Nurse / BPA Driven Protocol    Potassium Replacement - Follow Nurse / BPA Driven Protocol    [COMPLETED] Insert Peripheral IV **AND** sodium chloride    sodium chloride    sodium chloride     Physical Findings          Physical Appearance alert, obese, oriented, room air   Oral/Mouth Cavity WNL   Edema  generalized, 1+ (trace)   Gastrointestinal last bowel movement: 9/10   Skin  bruising   Tubes/Drains/Lines none   NFPE Not indicated at this time   --  NUTRITION INTERVENTION / PLAN OF CARE  Intervention Goal         Intervention Goal(s) Maintain nutrition status, Disease management/therapy, Increase intake, and Appropriate weight loss     Nutrition Intervention         RD Action Encourage intake and Follow Tx Progress     Nutrition Prescription         Diet Prescription     Supplement Prescription n/a   EN/PN Prescription    New Prescription Ordered? No changes at this time   --  Monitor/Evaluation        Monitor Per protocol   Discharge Needs Pending clinical course   Education Will instruct as  appropriate   --    RD to follow up per protocol.    Electronically signed by:  Vane Pavon RD  09/12/23 12:53 EDT

## 2023-09-12 NOTE — SIGNIFICANT NOTE
09/12/23 1024   Post Acute Pre-Cert Documentation   Request Submitted by Facility - Type: Hospital   Post-Acute Authorization Type Submitted: SNF   Date Post Acute Pre-Cert Inititated per Facility 09/11/23   Verification from Payer Yes   Date Post Acute Pre-Cert Completed 09/12/23   Accepting Facility [facility] Discharge Date Requested 09/12/23   All Clinicals Submitted? Yes   Had Accepting Facility at Time of Submission Yes   Response Received from Insurance? Approval   Response Communicated to: ;Accepting Facility Liaison   Authorization Number: [id number]

## 2023-09-12 NOTE — DISCHARGE SUMMARY
[facility]    Date of Admission: 9/2/2023  Date of Discharge:  9/12/2023    PCP: Eula Franco APRN    Discharge Diagnosis:   Active Hospital Problems    Diagnosis  POA    **Migraine [G43.909]  Yes    Tetrahydrocannabinol (THC) use disorder, mild, abuse [F12.10]  Yes    Glucose intolerance [E74.39]  Yes    Chronic pain syndrome [G89.4]  Yes    Opioid dependence [F11.20]  Yes    Mood disorder [F39]  Yes    Left-sided headache [R51.9]  Yes    Obstructive sleep apnea [G47.33]  Yes    Dyslipidemia [E78.5]  Yes      Resolved Hospital Problems    Diagnosis Date Resolved POA    Hypokalemia [E87.6] 09/06/2023 Yes     Procedures Performed  LP     Consults       Date and Time Order Name Status Description    9/8/2023  9:35 AM Inpatient Infectious Diseases Consult Completed     9/4/2023  8:42 AM Inpatient Psychiatrist Consult Completed     9/2/2023  3:34 PM Inpatient Neurology Consult General Completed     9/2/2023 11:37 AM [facility] (on-call MD unless specified) Details      9/2/2023 11:23 AM Inpatient Neurology Consult General Completed           Hospital Course  Please see history and physical for details. Patient is a 58 y.o. female who presented to the hospital with complaints of a left-sided headache in the setting of known relapsing remitting multiple sclerosis, epilepsy and migraines.  She became encephalopathic during the stay requiring intermittent need for restraints and psychiatric evaluation.    Neurology, ID and psychiatry were all consulted.  She was given supportive care for headache and placed on Topamax therapy as well.  CT of the head and CTA of brain were normal except for surgical changes of the right orbit floor. MRI brain was further obtained and also negative.  She not felt to have any seizure activity or clinical evidence of temporal arteritis with normal ESR and only mild the elevated CRP.  Given her mental status changes, LP was completed on 9/7  and revealed elevated protein and total nucleated cells.  Infectious disease was consulted and further studies obtained given she was on Ocrevus for her MS prior to admission.  Thus far, infectious work-up has remained negative and a few send out lab tests are still pending.  ID felt there was low percentage chance of those results actually coming back positive and felt she improved without any anti-infective therapy.  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  She was already established with Dr. Tien Boateng with [facility] neuro immunology given her improvement, and steroids were not started.  He was recommended to follow-up with her usual neurologist at discharge.    Psychiatry did eventually place her on scheduled Haldol which improved her symptoms as well.  Dr. Ortiz felt that she likely did have a significant psychiatric component to her mental status changes during the stay.  Given her improvement, the patient requested discontinuation of Haldol.  I discussed with Dr. Ortiz today who feels that she no longer eats this medication and he is okay with discontinuation at this time.  She should follow-up with outpatient PCP and/or psychiatry after discharge.   On the day of discharge, she denies any chest pain, dyspnea, cough, fever or chills.  She denies any nausea, vomiting or abdominal pain.  She was noted to have some hyperglycemia during the stay and A1c was checked and normal at 5%.  He had known chronic long-term hydrocodone use and was maintained on her as needed pain medications during the stay without any issues.  She also has known RANJANA and was continued on her home CPAP.  She did have some THC use noted and was recommended to avoid this in the future.  She did get some deconditioning during the stay related to her acute illness and was seen by PT/OT.  SNF has been recommended and she has been placed and accepted  today.  She should follow-up with neurology as well as her PCP as advised.      I discussed the patient's findings and my recommendations with patient, nursing staff, and consulting provider.    Condition on Discharge: Improved.     Temp:  [97.7 °F (36.5 °C)-98.6 °F (37 °C)] 97.7 °F (36.5 °C)  Heart Rate:  [84-99] 84  Resp:  [18] 18  BP: (132-140)/(74-84) 132/84  Body mass index is 42.36 kg/m².    Physical Exam  Constitutional:       General: She is not in acute distress.     Appearance: She is not toxic-appearing.   HENT:      Head: Normocephalic and atraumatic.   Eyes:      Extraocular Movements: Extraocular movements intact.   Cardiovascular:      Rate and Rhythm: Normal rate and regular rhythm.   Pulmonary:      Effort: Pulmonary effort is normal. No respiratory distress.      Breath sounds: Normal breath sounds. No wheezing or rhonchi.   Abdominal:      General: Bowel sounds are normal.      Palpations: Abdomen is soft.      Tenderness: There is no abdominal tenderness. There is no guarding or rebound.   Musculoskeletal:         General: No swelling.      Cervical back: Normal range of motion.   Skin:     General: Skin is warm and dry.   Neurological:      Mental Status: She is alert.   Psychiatric:         Behavior: Behavior is not agitated or aggressive.        Discharge Medications        New Medications        Instructions Start Date   famotidine 40 MG tablet  Commonly known as: PEPCID   40 mg, Oral, Daily   Start Date: September 13, 2023     magnesium oxide 400 tablet tablet  Commonly known as: MAG-OX   400 mg, Oral, Daily   Start Date: September 13, 2023     naloxone 4 MG/0.1ML nasal spray  Commonly known as: NARCAN   Call 911. Don't prime. [place] in 1 nostril for overdose. Repeat in 2-3 minutes in other nostril if no or minimal breathing/responsiveness.      potassium chloride 10 MEQ CR tablet   10 mEq, Oral, Daily   Start Date: September 13, 2023     sennosides-docusate 8.6-50 MG per tablet  Commonly  known as: PERICOLACE   2 tablets, Oral, 2 Times Daily      topiramate 50 MG tablet  Commonly known as: TOPAMAX   50 mg, Oral, Nightly      Vitamin B-2 100 MG tablet tablet  Commonly known as: RIBOFLAVIN   400 mg, Oral, Daily   Start Date: September 13, 2023            Changes to Medications        Instructions Start Date   HYDROcodone-acetaminophen 7.5-325 MG per tablet  Commonly known as: NORCO  What changed:   when to take this  reasons to take this   1 tablet, Oral, Every 12 Hours PRN             Continue These Medications        Instructions Start Date   albuterol sulfate  (90 Base) MCG/ACT inhaler  Commonly known as: PROVENTIL HFA;VENTOLIN HFA;PROAIR HFA   2 puffs, Inhalation, Every 4 Hours PRN      aspirin 81 MG EC tablet   81 mg, Oral, Daily      cloNIDine 0.1 MG tablet  Commonly known as: CATAPRES   0.1 mg, Oral, Nightly      lamoTRIgine 100 MG tablet  Commonly known as: LaMICtal   200 mg, Oral, 2 Times Daily      MULTIVITAMINS PO   1 tablet, Oral, Daily      vitamin B-12 1000 MCG tablet  Commonly known as: CYANOCOBALAMIN   1,000 mcg, Oral, Daily, Takes q other day       vitamin D 1.25 MG (57918 UT) capsule capsule  Commonly known as: ERGOCALCIFEROL   TAKE ONE CAPSULE BY MOUTH WEEKLY             Stop These Medications      Ocrelizumab 300 MG/10ML solution             Diet Instructions       Diet: Regular/House Diet; Regular Texture (IDDSI 7); Thin (IDDSI 0)      Discharge Diet: Regular/House Diet    Texture: Regular Texture (IDDSI 7)    Fluid Consistency: Thin (IDDSI 0)           Activity Instructions       Activity as Tolerated             Additional Instructions for the Follow-ups that You Need to Schedule       Discharge Follow-up with PCP   As directed       Currently Documented PCP:    Eula Franco APRN    PCP Phone Number:    [phone]     Follow Up Details: see in 1-2 weeks        Discharge Follow-up with Specified Provider: Neurology at [facility]; 3 Weeks   As directed      To:  Neurology at [facility]   Follow Up: 3 Weeks        Discharge Follow-up with Specified Provider: Psychiatry; 2 Weeks   As directed      To: Psychiatry   Follow Up: 2 Weeks               Contact information for follow-up providers       Eula Franco, APRN .    Specialty: Nurse Practitioner  Contact information:  [address]  [address]  [facility] [id number] [phone]                       Contact information for after-discharge care       Destination       [facility] .    Service: Skilled Nursing  Contact information:  [address]  [facility] [address] [phone]                                 Test Results Pending at Discharge  Pending Labs       Order Current Status    Encephalopathy, Autoimmune Evaluation, Spinal Fluid - Cerebrospinal Fluid, In process    Fungus Culture - Cerebrospinal Fluid, Lumbar Puncture In process    Toxoplasma Antibodies IgG / IgM In process    [place] Antibodies, IgG & IgM In process    AFB Culture - Cerebrospinal Fluid, Lumbar Puncture Preliminary result           SHERMAN Foster  09/12/23  13:06 EDT    Discharge time spent greater than 30 minutes.

## 2023-09-12 NOTE — PROGRESS NOTES
Discharge Planning Assessment  [facility]     Patient Name: Annemarie Ha  MRN: [id number]  Today's Date: 9/12/2023    Admit Date: 9/2/2023    Plan: [facility] skilled rehab, [employer] wheel chair van transport arranged for 1600 today   Discharge Needs Assessment    No documentation.                  Discharge Plan       Row Name 09/12/23 1332       Plan    Plan [facility] skilled rehab, [employer] wheel chair van transport arranged for 1600 today    Final Discharge Disposition Code 03 - skilled nursing facility (SNF)    Final Note Patient has discharge orders today and will be going to [facility] for skilled rehab. [employer] Medicare precert obtained. Call placed to spouse he is agreeable with this discharge plan. [employer]/[phone]  wheel chair van arranged for 4PM today. [facility] PIRES                  Continued Care and Services - Admitted Since 9/2/2023       Destination Coordination complete.      Service Provider Request Status Selected Services Address Phone Fax Patient Preferred    [facility]  Selected Skilled Nursing [address] [facility] [id number] [phone] --    [facility] Pending - Request Sent N/A [address] [facility] [id number] [phone] --    [facility] Pending - [facility] Sent N/A [address] [facility] [id number] [phone] --    [facility] Declined  Cannot meet patient's needs N/A [address] [facility] [id number] [phone] --    [facility] Declined  Cannot meet patient's needs, Bed not available N/A [address] [facility] [id number] [phone] --                  Expected Discharge Date and Time       Expected Discharge Date Expected Discharge Time    Sep 12, 2023            Demographic Summary    No documentation.                  Functional Status    No documentation.                   Psychosocial    No documentation.                  Abuse/Neglect    No documentation.                  Legal    No documentation.                  Substance Abuse    No documentation.                  Patient Forms    No documentation.                     Gianna Brooke RN

## 2023-09-13 LAB
SPECIMEN STATUS: NORMAL
WNV IGG SER QL IA: NEGATIVE
WNV IGM SER QL IA: POSITIVE

## 2023-09-14 ENCOUNTER — DOCUMENTATION (OUTPATIENT)
Dept: INFECTIOUS DISEASES | Facility: CLINIC | Age: 58
End: 2023-09-14
Payer: MEDICARE

## 2023-09-14 LAB
LABORATORY COMMENT REPORT: NORMAL
T GONDII IGG SERPL IA-ACNC: <3 IU/ML (ref 0–7.1)
T GONDII IGM SER IA-ACNC: <3 AU/ML (ref 0–7.9)

## 2023-09-14 NOTE — PROGRESS NOTES
Serum WNV IgM positive. CSF Ab is pending. I called to let her know. No answer. Left VM.     No treatment needed. It is a self-resolving illness. She was already getting better at discharge.

## 2023-09-15 LAB
FUNGUS WND CULT: NORMAL
MYCOBACTERIUM SPEC CULT: NORMAL
NIGHT BLUE STAIN TISS: NORMAL
REF LAB TEST METHOD: NORMAL

## 2023-09-22 LAB
FUNGUS WND CULT: NORMAL
MYCOBACTERIUM SPEC CULT: NORMAL
NIGHT BLUE STAIN TISS: NORMAL

## 2023-09-29 LAB
FUNGUS WND CULT: NORMAL
MYCOBACTERIUM SPEC CULT: NORMAL
NIGHT BLUE STAIN TISS: NORMAL

## 2023-10-06 ENCOUNTER — APPOINTMENT (OUTPATIENT)
Dept: CT IMAGING | Facility: HOSPITAL | Age: 58
End: 2023-10-06
Payer: MEDICARE

## 2023-10-06 ENCOUNTER — HOSPITAL ENCOUNTER (OUTPATIENT)
Facility: HOSPITAL | Age: 58
Setting detail: OBSERVATION
Discharge: HOME | End: 2023-10-07
Attending: STUDENT IN AN ORGANIZED HEALTH CARE EDUCATION/TRAINING PROGRAM | Admitting: EMERGENCY MEDICINE
Payer: MEDICARE

## 2023-10-06 ENCOUNTER — APPOINTMENT (OUTPATIENT)
Dept: GENERAL RADIOLOGY | Facility: HOSPITAL | Age: 58
End: 2023-10-06
Payer: MEDICARE

## 2023-10-06 DIAGNOSIS — R07.9 CHEST PAIN, UNSPECIFIED TYPE: Primary | ICD-10-CM

## 2023-10-06 LAB
ALBUMIN SERPL-MCNC: 4.2 G/DL (ref 3.5–5.2)
ALBUMIN/GLOB SERPL: 2.1 G/DL
ALP SERPL-CCNC: 72 U/L (ref 39–117)
ALT SERPL W P-5'-P-CCNC: 8 U/L (ref 1–33)
ANION GAP SERPL CALCULATED.3IONS-SCNC: 12.5 MMOL/L (ref 5–15)
AST SERPL-CCNC: 6 U/L (ref 1–32)
BASOPHILS # BLD AUTO: 0.01 10*3/MM3 (ref 0–0.2)
BASOPHILS NFR BLD AUTO: 0.3 % (ref 0–1.5)
BILIRUB SERPL-MCNC: 0.5 MG/DL (ref 0–1.2)
BUN SERPL-MCNC: 6 MG/DL (ref 8–23)
BUN/CREAT SERPL: 5.9 (ref 7–25)
CALCIUM SPEC-SCNC: 9.4 MG/DL (ref 8.6–10.5)
CHLORIDE SERPL-SCNC: 103 MMOL/L (ref 98–107)
CO2 SERPL-SCNC: 22.5 MMOL/L (ref 22–29)
CREAT SERPL-MCNC: 1.02 MG/DL (ref 0.57–1)
DEPRECATED RDW RBC AUTO: 42.1 FL (ref 37–54)
EGFRCR SERPLBLD CKD-EPI 2021: 63.1 ML/MIN/1.73
EOSINOPHIL # BLD AUTO: 0.05 10*3/MM3 (ref 0–0.4)
EOSINOPHIL NFR BLD AUTO: 1.3 % (ref 0.3–6.2)
ERYTHROCYTE [DISTWIDTH] IN BLOOD BY AUTOMATED COUNT: 12.9 % (ref 12.3–15.4)
FUNGUS WND CULT: NORMAL
GEN 5 2HR TROPONIN T REFLEX: 10 NG/L
GLOBULIN UR ELPH-MCNC: 2 GM/DL
GLUCOSE SERPL-MCNC: 117 MG/DL (ref 65–99)
HCT VFR BLD AUTO: 36.1 % (ref 34–46.6)
HGB BLD-MCNC: 12.2 G/DL (ref 12–15.9)
HOLD SPECIMEN: NORMAL
HOLD SPECIMEN: NORMAL
IMM GRANULOCYTES # BLD AUTO: 0.01 10*3/MM3 (ref 0–0.05)
IMM GRANULOCYTES NFR BLD AUTO: 0.3 % (ref 0–0.5)
LYMPHOCYTES # BLD AUTO: 0.49 10*3/MM3 (ref 0.7–3.1)
LYMPHOCYTES NFR BLD AUTO: 13 % (ref 19.6–45.3)
MAGNESIUM SERPL-MCNC: 2.2 MG/DL (ref 1.6–2.4)
MCH RBC QN AUTO: 30 PG (ref 26.6–33)
MCHC RBC AUTO-ENTMCNC: 33.8 G/DL (ref 31.5–35.7)
MCV RBC AUTO: 88.9 FL (ref 79–97)
MONOCYTES # BLD AUTO: 0.33 10*3/MM3 (ref 0.1–0.9)
MONOCYTES NFR BLD AUTO: 8.8 % (ref 5–12)
MYCOBACTERIUM SPEC CULT: NORMAL
NEUTROPHILS NFR BLD AUTO: 2.88 10*3/MM3 (ref 1.7–7)
NEUTROPHILS NFR BLD AUTO: 76.3 % (ref 42.7–76)
NIGHT BLUE STAIN TISS: NORMAL
NRBC BLD AUTO-RTO: 0 /100 WBC (ref 0–0.2)
PLATELET # BLD AUTO: 294 10*3/MM3 (ref 140–450)
PMV BLD AUTO: 10 FL (ref 6–12)
POTASSIUM SERPL-SCNC: 3.8 MMOL/L (ref 3.5–5.2)
PROT SERPL-MCNC: 6.2 G/DL (ref 6–8.5)
QT INTERVAL: 392 MS
QTC INTERVAL: 408 MS
RBC # BLD AUTO: 4.06 10*6/MM3 (ref 3.77–5.28)
SODIUM SERPL-SCNC: 138 MMOL/L (ref 136–145)
TROPONIN T DELTA: 0 NG/L
TROPONIN T SERPL HS-MCNC: 10 NG/L
WBC NRBC COR # BLD: 3.77 10*3/MM3 (ref 3.4–10.8)
WHOLE BLOOD HOLD COAG: NORMAL
WHOLE BLOOD HOLD SPECIMEN: NORMAL

## 2023-10-06 PROCEDURE — 84484 ASSAY OF TROPONIN QUANT: CPT | Performed by: STUDENT IN AN ORGANIZED HEALTH CARE EDUCATION/TRAINING PROGRAM

## 2023-10-06 PROCEDURE — 25010000002 KETOROLAC TROMETHAMINE PER 15 MG: Performed by: STUDENT IN AN ORGANIZED HEALTH CARE EDUCATION/TRAINING PROGRAM

## 2023-10-06 PROCEDURE — 71275 CT ANGIOGRAPHY CHEST: CPT

## 2023-10-06 PROCEDURE — 93005 ELECTROCARDIOGRAM TRACING: CPT | Performed by: STUDENT IN AN ORGANIZED HEALTH CARE EDUCATION/TRAINING PROGRAM

## 2023-10-06 PROCEDURE — 99284 EMERGENCY DEPT VISIT MOD MDM: CPT

## 2023-10-06 PROCEDURE — G0378 HOSPITAL OBSERVATION PER HR: HCPCS

## 2023-10-06 PROCEDURE — 70450 CT HEAD/BRAIN W/O DYE: CPT

## 2023-10-06 PROCEDURE — 36415 COLL VENOUS BLD VENIPUNCTURE: CPT

## 2023-10-06 PROCEDURE — 83735 ASSAY OF MAGNESIUM: CPT | Performed by: STUDENT IN AN ORGANIZED HEALTH CARE EDUCATION/TRAINING PROGRAM

## 2023-10-06 PROCEDURE — 99285 EMERGENCY DEPT VISIT HI MDM: CPT

## 2023-10-06 PROCEDURE — 85025 COMPLETE CBC W/AUTO DIFF WBC: CPT | Performed by: STUDENT IN AN ORGANIZED HEALTH CARE EDUCATION/TRAINING PROGRAM

## 2023-10-06 PROCEDURE — 25510000001 IOPAMIDOL PER 1 ML: Performed by: EMERGENCY MEDICINE

## 2023-10-06 PROCEDURE — 71045 X-RAY EXAM CHEST 1 VIEW: CPT

## 2023-10-06 PROCEDURE — 96374 THER/PROPH/DIAG INJ IV PUSH: CPT

## 2023-10-06 PROCEDURE — 80053 COMPREHEN METABOLIC PANEL: CPT | Performed by: STUDENT IN AN ORGANIZED HEALTH CARE EDUCATION/TRAINING PROGRAM

## 2023-10-06 RX ORDER — ONDANSETRON 4 MG/1
4 TABLET, FILM COATED ORAL EVERY 6 HOURS PRN
Status: DISCONTINUED | OUTPATIENT
Start: 2023-10-06 | End: 2023-10-07 | Stop reason: HOSPADM

## 2023-10-06 RX ORDER — ASPIRIN 81 MG/1
324 TABLET, CHEWABLE ORAL ONCE
Status: DISCONTINUED | OUTPATIENT
Start: 2023-10-06 | End: 2023-10-07 | Stop reason: HOSPADM

## 2023-10-06 RX ORDER — METHOCARBAMOL 750 MG/1
750 TABLET, FILM COATED ORAL EVERY 6 HOURS PRN
Status: DISCONTINUED | OUTPATIENT
Start: 2023-10-06 | End: 2023-10-07 | Stop reason: HOSPADM

## 2023-10-06 RX ORDER — ALBUTEROL SULFATE 2.5 MG/3ML
2.5 SOLUTION RESPIRATORY (INHALATION) EVERY 6 HOURS PRN
Status: DISCONTINUED | OUTPATIENT
Start: 2023-10-06 | End: 2023-10-07 | Stop reason: HOSPADM

## 2023-10-06 RX ORDER — SODIUM CHLORIDE 0.9 % (FLUSH) 0.9 %
10 SYRINGE (ML) INJECTION EVERY 12 HOURS SCHEDULED
Status: DISCONTINUED | OUTPATIENT
Start: 2023-10-06 | End: 2023-10-07 | Stop reason: HOSPADM

## 2023-10-06 RX ORDER — DIPHENOXYLATE HYDROCHLORIDE AND ATROPINE SULFATE 2.5; .025 MG/1; MG/1
1 TABLET ORAL DAILY
Status: DISCONTINUED | OUTPATIENT
Start: 2023-10-06 | End: 2023-10-07 | Stop reason: HOSPADM

## 2023-10-06 RX ORDER — ASPIRIN 81 MG/1
81 TABLET ORAL DAILY
Status: DISCONTINUED | OUTPATIENT
Start: 2023-10-06 | End: 2023-10-07 | Stop reason: HOSPADM

## 2023-10-06 RX ORDER — NITROGLYCERIN 0.4 MG/1
0.4 TABLET SUBLINGUAL
Status: DISCONTINUED | OUTPATIENT
Start: 2023-10-06 | End: 2023-10-07 | Stop reason: HOSPADM

## 2023-10-06 RX ORDER — TOPIRAMATE 100 MG/1
50 TABLET, FILM COATED ORAL NIGHTLY
Status: DISCONTINUED | OUTPATIENT
Start: 2023-10-06 | End: 2023-10-07 | Stop reason: HOSPADM

## 2023-10-06 RX ORDER — SODIUM CHLORIDE 0.9 % (FLUSH) 0.9 %
10 SYRINGE (ML) INJECTION AS NEEDED
Status: DISCONTINUED | OUTPATIENT
Start: 2023-10-06 | End: 2023-10-07 | Stop reason: HOSPADM

## 2023-10-06 RX ORDER — CHOLECALCIFEROL (VITAMIN D3) 125 MCG
5 CAPSULE ORAL NIGHTLY PRN
Status: DISCONTINUED | OUTPATIENT
Start: 2023-10-06 | End: 2023-10-07 | Stop reason: HOSPADM

## 2023-10-06 RX ORDER — ACETAMINOPHEN 325 MG/1
650 TABLET ORAL EVERY 4 HOURS PRN
Status: DISCONTINUED | OUTPATIENT
Start: 2023-10-06 | End: 2023-10-07 | Stop reason: HOSPADM

## 2023-10-06 RX ORDER — AMOXICILLIN 250 MG
2 CAPSULE ORAL 2 TIMES DAILY
Status: DISCONTINUED | OUTPATIENT
Start: 2023-10-06 | End: 2023-10-07 | Stop reason: HOSPADM

## 2023-10-06 RX ORDER — BISACODYL 10 MG
10 SUPPOSITORY, RECTAL RECTAL DAILY PRN
Status: DISCONTINUED | OUTPATIENT
Start: 2023-10-06 | End: 2023-10-07 | Stop reason: HOSPADM

## 2023-10-06 RX ORDER — SODIUM CHLORIDE 9 MG/ML
40 INJECTION, SOLUTION INTRAVENOUS AS NEEDED
Status: DISCONTINUED | OUTPATIENT
Start: 2023-10-06 | End: 2023-10-07 | Stop reason: HOSPADM

## 2023-10-06 RX ORDER — KETOROLAC TROMETHAMINE 15 MG/ML
15 INJECTION, SOLUTION INTRAMUSCULAR; INTRAVENOUS EVERY 6 HOURS PRN
Status: DISCONTINUED | OUTPATIENT
Start: 2023-10-06 | End: 2023-10-07 | Stop reason: HOSPADM

## 2023-10-06 RX ORDER — LIDOCAINE 50 MG/G
1 PATCH TOPICAL
Status: DISCONTINUED | OUTPATIENT
Start: 2023-10-06 | End: 2023-10-07 | Stop reason: HOSPADM

## 2023-10-06 RX ORDER — CHOLECALCIFEROL (VITAMIN D3) 125 MCG
1000 CAPSULE ORAL DAILY
Status: DISCONTINUED | OUTPATIENT
Start: 2023-10-06 | End: 2023-10-07 | Stop reason: HOSPADM

## 2023-10-06 RX ORDER — ONDANSETRON 2 MG/ML
4 INJECTION INTRAMUSCULAR; INTRAVENOUS EVERY 6 HOURS PRN
Status: DISCONTINUED | OUTPATIENT
Start: 2023-10-06 | End: 2023-10-07 | Stop reason: HOSPADM

## 2023-10-06 RX ORDER — BISACODYL 5 MG/1
5 TABLET, DELAYED RELEASE ORAL DAILY PRN
Status: DISCONTINUED | OUTPATIENT
Start: 2023-10-06 | End: 2023-10-07 | Stop reason: HOSPADM

## 2023-10-06 RX ORDER — ERGOCALCIFEROL 1.25 MG/1
50000 CAPSULE ORAL WEEKLY
Status: DISCONTINUED | OUTPATIENT
Start: 2023-10-06 | End: 2023-10-07 | Stop reason: HOSPADM

## 2023-10-06 RX ORDER — FAMOTIDINE 20 MG/1
40 TABLET, FILM COATED ORAL DAILY
Status: DISCONTINUED | OUTPATIENT
Start: 2023-10-06 | End: 2023-10-07 | Stop reason: HOSPADM

## 2023-10-06 RX ORDER — LAMOTRIGINE 100 MG/1
200 TABLET ORAL 2 TIMES DAILY
Status: DISCONTINUED | OUTPATIENT
Start: 2023-10-06 | End: 2023-10-07 | Stop reason: HOSPADM

## 2023-10-06 RX ORDER — HYDROCODONE BITARTRATE AND ACETAMINOPHEN 7.5; 325 MG/1; MG/1
1 TABLET ORAL EVERY 12 HOURS PRN
Status: DISCONTINUED | OUTPATIENT
Start: 2023-10-06 | End: 2023-10-07 | Stop reason: HOSPADM

## 2023-10-06 RX ORDER — POLYETHYLENE GLYCOL 3350 17 G/17G
17 POWDER, FOR SOLUTION ORAL DAILY PRN
Status: DISCONTINUED | OUTPATIENT
Start: 2023-10-06 | End: 2023-10-07 | Stop reason: HOSPADM

## 2023-10-06 RX ADMIN — HYDROCODONE BITARTRATE AND ACETAMINOPHEN 1 TABLET: 7.5; 325 TABLET ORAL at 16:47

## 2023-10-06 RX ADMIN — METHOCARBAMOL TABLETS 750 MG: 750 TABLET, COATED ORAL at 20:44

## 2023-10-06 RX ADMIN — TOPIRAMATE 50 MG: 100 TABLET, FILM COATED ORAL at 20:44

## 2023-10-06 RX ADMIN — LAMOTRIGINE 200 MG: 100 TABLET ORAL at 20:44

## 2023-10-06 RX ADMIN — LIDOCAINE 1 PATCH: 50 PATCH TOPICAL at 14:37

## 2023-10-06 RX ADMIN — KETOROLAC TROMETHAMINE 15 MG: 15 INJECTION, SOLUTION INTRAMUSCULAR; INTRAVENOUS at 15:43

## 2023-10-06 RX ADMIN — IOPAMIDOL 79 ML: 755 INJECTION, SOLUTION INTRAVENOUS at 22:09

## 2023-10-06 RX ADMIN — Medication 10 ML: at 20:46

## 2023-10-06 NOTE — ED NOTES
"Nursing report ED to floor  Annemarie Ha  58 y.o.  female    HPI :   Chief Complaint   Patient presents with    Chest Pain       Admitting doctor:   Nancy Greene MD    Admitting diagnosis:   The encounter diagnosis was Chest pain, unspecified type.    Code status:   Current Code Status       Date Active Code Status Order ID Comments User Context       Prior            Allergies:   Glatiramer, Diclofenac, and Glatiramer acetate    Isolation:   No active isolations    Intake and Output  No intake or output data in the 24 hours ending 10/06/23 1423    Weight:       10/06/23  1157   Weight: 105 kg (231 lb)       Most recent vitals:   Vitals:    10/06/23 1156 10/06/23 1157   BP: 140/80    BP Location: Right arm    Patient Position: Sitting    Pulse: 80    Resp: 18    Temp:  98.6 °F (37 °C)   TempSrc:  Oral   SpO2: 100%    Weight:  105 kg (231 lb)   Height:  165.1 cm (65\")       Active LDAs/IV Access:   Lines, Drains & Airways       Active LDAs       Name Placement date Placement time Site Days    Peripheral IV 10/06/23 1217 Right Antecubital 10/06/23  1217  Antecubital  less than 1                    Labs (abnormal labs have a star):   Labs Reviewed   COMPREHENSIVE METABOLIC PANEL - Abnormal; Notable for the following components:       Result Value    Glucose 117 (*)     BUN 6 (*)     Creatinine 1.02 (*)     BUN/Creatinine Ratio 5.9 (*)     All other components within normal limits    Narrative:     GFR Normal >60  Chronic Kidney Disease <60  Kidney Failure <15     TROPONIN - Abnormal; Notable for the following components:    HS Troponin T 10 (*)     All other components within normal limits    Narrative:     High Sensitive Troponin T Reference Range:  <10.0 ng/L- Negative Female for AMI  <15.0 ng/L- Negative Male for AMI  >=10 - Abnormal Female indicating possible myocardial injury.  >=15 - Abnormal Male indicating possible myocardial injury.   Clinicians would have to utilize clinical acumen, EKG, " Troponin, and serial changes to determine if it is an Acute Myocardial Infarction or myocardial injury due to an underlying chronic condition.        CBC WITH AUTO DIFFERENTIAL - Abnormal; Notable for the following components:    Neutrophil % 76.3 (*)     Lymphocyte % 13.0 (*)     Lymphocytes, Absolute 0.49 (*)     All other components within normal limits   RAINBOW DRAW    Narrative:     The following orders were created for panel order [facility] Draw.  Procedure                               Abnormality         Status                     ---------                               -----------         ------                     Green Top (Gel)[[id number]]                                  Final result               Lavender Top[[id number]]                                     Final result               Gold Top - SST[[id number]]                                   Final result               Light Blue Top[[id number]]                                   Final result                 Please view results for these tests on the individual orders.   HIGH SENSITIVITIY TROPONIN T 2HR   CBC AND DIFFERENTIAL    Narrative:     The following orders were created for panel order CBC & Differential.  Procedure                               Abnormality         Status                     ---------                               -----------         ------                     CBC Auto Differential[[id number]]        Abnormal            Final result                 Please view results for these tests on the individual orders.   GREEN TOP   LAVENDER TOP   GOLD TOP - SST   LIGHT BLUE TOP       EKG:   ECG 12 Lead Chest Pain   Preliminary Result   HEART RATE= 65  bpm   RR Interval= 923  ms   WA Interval= 152  ms   P Horizontal Axis= 10  deg   P Front Axis= 50  deg   QRSD Interval= 86  ms   QT Interval= 392  ms   QTcB= 408  ms   QRS Axis= 39  deg   T Wave Axis= 41  deg   - NORMAL ECG -   Sinus rhythm   Electronically Signed By:    Date and Time of Study:  2023-10-06 12:37:18          Meds given in ED:   Medications   sodium chloride 0.9 % flush 10 mL (has no administration in time range)   aspirin chewable tablet 324 mg (324 mg Oral Not Given 10/6/23 1211)       Imaging results:  CT Head Without Contrast    Result Date: 10/6/2023  Negative.  This report was finalized on 10/6/2023 2:00 PM by Dr. Loco Barlow M.D on Workstation: [id number]      XR Chest 1 View    Result Date: 10/6/2023  No focal pulmonary consolidation. Follow-up as clinical indications persist.  This report was finalized on 10/6/2023 12:58 PM by Dr. Kris Mcknight M.D on Workstation: [id number]       Ambulatory status:   - adlib    Social issues:   Social History     Socioeconomic History    Marital status:    Tobacco Use    Smoking status: Some Days     Packs/day: 0.50     Years: 44.00     Pack years: 22.00     Types: Cigarettes   Vaping Use    Vaping Use: Some days    Substances: Nicotine    Devices: Disposable, Pre-filled or refillable cartridge, Refillable tank   Substance and Sexual Activity    Alcohol use: Yes    Drug use: Yes     Frequency: 3.0 times per week     Types: Marijuana    Sexual activity: Defer       NIH Stroke Scale:       Brittany Crook RN  10/06/23 14:23 EDT
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
## 2023-10-06 NOTE — PLAN OF CARE
Goal Outcome Evaluation:            Pt is alert, oriented and ambulatory. She expresses transient dizziness since having West Nile Virus last month. We discussed fall risks and safety. She understands plan of care and is in agreement.     While she had chest pain in the early morning, it has resolved. She now indicates back pain is her primary complaint.    She did have a run of [employer] . K Nagi aware. She was not symptomatic.

## 2023-10-06 NOTE — ED NOTES
Pt to ED via EMS from home for chest pain that started this morning around 5am, which got better, she went back to bed but it was present when she woke up at 0730.   6/10, Sharp/heavy located over left chest
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
## 2023-10-07 ENCOUNTER — APPOINTMENT (OUTPATIENT)
Dept: NUCLEAR MEDICINE | Facility: HOSPITAL | Age: 58
End: 2023-10-07
Payer: MEDICARE

## 2023-10-07 VITALS
BODY MASS INDEX: 37.12 KG/M2 | WEIGHT: 231 LBS | RESPIRATION RATE: 18 BRPM | OXYGEN SATURATION: 96 % | SYSTOLIC BLOOD PRESSURE: 135 MMHG | TEMPERATURE: 98.2 F | HEART RATE: 79 BPM | DIASTOLIC BLOOD PRESSURE: 92 MMHG | HEIGHT: 66 IN

## 2023-10-07 LAB
ANION GAP SERPL CALCULATED.3IONS-SCNC: 14.3 MMOL/L (ref 5–15)
BH CV REST NUCLEAR ISOTOPE DOSE: 8 MCI
BH CV STRESS COMMENTS STAGE 1: NORMAL
BH CV STRESS DOSE REGADENOSON STAGE 1: 0.4
BH CV STRESS DURATION MIN STAGE 1: 0
BH CV STRESS DURATION SEC STAGE 1: 10
BH CV STRESS NUCLEAR ISOTOPE DOSE: 32 MCI
BH CV STRESS PROTOCOL 1: NORMAL
BH CV STRESS RECOVERY BP: NORMAL MMHG
BH CV STRESS RECOVERY HR: 90 BPM
BH CV STRESS STAGE 1: 1
BUN SERPL-MCNC: 7 MG/DL (ref 8–23)
BUN/CREAT SERPL: 6.9 (ref 7–25)
CALCIUM SPEC-SCNC: 9.6 MG/DL (ref 8.6–10.5)
CHLORIDE SERPL-SCNC: 103 MMOL/L (ref 98–107)
CHOLEST SERPL-MCNC: 216 MG/DL (ref 0–200)
CO2 SERPL-SCNC: 20.7 MMOL/L (ref 22–29)
CREAT SERPL-MCNC: 1.02 MG/DL (ref 0.57–1)
DEPRECATED RDW RBC AUTO: 43 FL (ref 37–54)
EGFRCR SERPLBLD CKD-EPI 2021: 63.1 ML/MIN/1.73
ERYTHROCYTE [DISTWIDTH] IN BLOOD BY AUTOMATED COUNT: 13.3 % (ref 12.3–15.4)
GLUCOSE SERPL-MCNC: 101 MG/DL (ref 65–99)
HCT VFR BLD AUTO: 37.8 % (ref 34–46.6)
HDLC SERPL-MCNC: 74 MG/DL (ref 40–60)
HGB BLD-MCNC: 13.2 G/DL (ref 12–15.9)
LDLC SERPL CALC-MCNC: 124 MG/DL (ref 0–100)
LDLC/HDLC SERPL: 1.64 {RATIO}
LV EF NUC BP: 80 %
MAXIMAL PREDICTED HEART RATE: 160 BPM
MCH RBC QN AUTO: 30.8 PG (ref 26.6–33)
MCHC RBC AUTO-ENTMCNC: 34.9 G/DL (ref 31.5–35.7)
MCV RBC AUTO: 88.3 FL (ref 79–97)
PERCENT MAX PREDICTED HR: 57.5 %
PLATELET # BLD AUTO: 346 10*3/MM3 (ref 140–450)
PMV BLD AUTO: 10.4 FL (ref 6–12)
POTASSIUM SERPL-SCNC: 3.7 MMOL/L (ref 3.5–5.2)
QT INTERVAL: 415 MS
QTC INTERVAL: 438 MS
RBC # BLD AUTO: 4.28 10*6/MM3 (ref 3.77–5.28)
SODIUM SERPL-SCNC: 138 MMOL/L (ref 136–145)
STRESS BASELINE BP: NORMAL MMHG
STRESS BASELINE HR: 64 BPM
STRESS PERCENT HR: 68 %
STRESS POST PEAK BP: NORMAL MMHG
STRESS POST PEAK HR: 92 BPM
STRESS TARGET HR: 136 BPM
TRIGL SERPL-MCNC: 102 MG/DL (ref 0–150)
TROPONIN T SERPL HS-MCNC: 13 NG/L
VLDLC SERPL-MCNC: 18 MG/DL (ref 5–40)
WBC NRBC COR # BLD: 4.88 10*3/MM3 (ref 3.4–10.8)

## 2023-10-07 PROCEDURE — G0378 HOSPITAL OBSERVATION PER HR: HCPCS

## 2023-10-07 PROCEDURE — 99204 OFFICE O/P NEW MOD 45 MIN: CPT | Performed by: INTERNAL MEDICINE

## 2023-10-07 PROCEDURE — 78452 HT MUSCLE IMAGE SPECT MULT: CPT | Performed by: INTERNAL MEDICINE

## 2023-10-07 PROCEDURE — 96375 TX/PRO/DX INJ NEW DRUG ADDON: CPT

## 2023-10-07 PROCEDURE — 93016 CV STRESS TEST SUPVJ ONLY: CPT | Performed by: INTERNAL MEDICINE

## 2023-10-07 PROCEDURE — 93017 CV STRESS TEST TRACING ONLY: CPT

## 2023-10-07 PROCEDURE — 25010000002 KETOROLAC TROMETHAMINE PER 15 MG: Performed by: STUDENT IN AN ORGANIZED HEALTH CARE EDUCATION/TRAINING PROGRAM

## 2023-10-07 PROCEDURE — 25010000002 REGADENOSON 0.4 MG/5ML SOLUTION: Performed by: EMERGENCY MEDICINE

## 2023-10-07 PROCEDURE — 25010000002 ONDANSETRON PER 1 MG: Performed by: STUDENT IN AN ORGANIZED HEALTH CARE EDUCATION/TRAINING PROGRAM

## 2023-10-07 PROCEDURE — 80048 BASIC METABOLIC PNL TOTAL CA: CPT | Performed by: STUDENT IN AN ORGANIZED HEALTH CARE EDUCATION/TRAINING PROGRAM

## 2023-10-07 PROCEDURE — A9500 TC99M SESTAMIBI: HCPCS | Performed by: EMERGENCY MEDICINE

## 2023-10-07 PROCEDURE — 85027 COMPLETE CBC AUTOMATED: CPT | Performed by: STUDENT IN AN ORGANIZED HEALTH CARE EDUCATION/TRAINING PROGRAM

## 2023-10-07 PROCEDURE — 84484 ASSAY OF TROPONIN QUANT: CPT | Performed by: STUDENT IN AN ORGANIZED HEALTH CARE EDUCATION/TRAINING PROGRAM

## 2023-10-07 PROCEDURE — 78452 HT MUSCLE IMAGE SPECT MULT: CPT

## 2023-10-07 PROCEDURE — 0 TECHNETIUM SESTAMIBI: Performed by: EMERGENCY MEDICINE

## 2023-10-07 PROCEDURE — 96376 TX/PRO/DX INJ SAME DRUG ADON: CPT

## 2023-10-07 PROCEDURE — 93005 ELECTROCARDIOGRAM TRACING: CPT | Performed by: STUDENT IN AN ORGANIZED HEALTH CARE EDUCATION/TRAINING PROGRAM

## 2023-10-07 PROCEDURE — 93018 CV STRESS TEST I&R ONLY: CPT | Performed by: INTERNAL MEDICINE

## 2023-10-07 PROCEDURE — 80061 LIPID PANEL: CPT | Performed by: STUDENT IN AN ORGANIZED HEALTH CARE EDUCATION/TRAINING PROGRAM

## 2023-10-07 RX ORDER — REGADENOSON 0.08 MG/ML
0.4 INJECTION, SOLUTION INTRAVENOUS
Status: COMPLETED | OUTPATIENT
Start: 2023-10-07 | End: 2023-10-07

## 2023-10-07 RX ORDER — ATORVASTATIN CALCIUM 20 MG/1
40 TABLET, FILM COATED ORAL DAILY
Status: DISCONTINUED | OUTPATIENT
Start: 2023-10-07 | End: 2023-10-07 | Stop reason: HOSPADM

## 2023-10-07 RX ORDER — ATORVASTATIN CALCIUM 40 MG/1
40 TABLET, FILM COATED ORAL DAILY
Qty: 90 TABLET | Refills: 0 | Status: SHIPPED | OUTPATIENT
Start: 2023-10-08

## 2023-10-07 RX ORDER — FLUOXETINE HYDROCHLORIDE 20 MG/1
40 CAPSULE ORAL DAILY
COMMUNITY
Start: 2023-09-19 | End: 2023-12-18

## 2023-10-07 RX ORDER — FLUOXETINE HYDROCHLORIDE 20 MG/1
40 CAPSULE ORAL DAILY
Status: DISCONTINUED | OUTPATIENT
Start: 2023-10-07 | End: 2023-10-07 | Stop reason: HOSPADM

## 2023-10-07 RX ADMIN — Medication 10 ML: at 08:27

## 2023-10-07 RX ADMIN — METHOCARBAMOL TABLETS 750 MG: 750 TABLET, COATED ORAL at 05:40

## 2023-10-07 RX ADMIN — ASPIRIN 81 MG: 81 TABLET, COATED ORAL at 08:26

## 2023-10-07 RX ADMIN — HYDROCODONE BITARTRATE AND ACETAMINOPHEN 1 TABLET: 7.5; 325 TABLET ORAL at 05:40

## 2023-10-07 RX ADMIN — TECHNETIUM TC 99M SESTAMIBI 1 DOSE: 1 INJECTION INTRAVENOUS at 12:58

## 2023-10-07 RX ADMIN — TECHNETIUM TC 99M SESTAMIBI 1 DOSE: 1 INJECTION INTRAVENOUS at 11:00

## 2023-10-07 RX ADMIN — Medication 10 ML: at 08:33

## 2023-10-07 RX ADMIN — Medication 1000 MCG: at 08:26

## 2023-10-07 RX ADMIN — LIDOCAINE 1 PATCH: 50 PATCH TOPICAL at 08:27

## 2023-10-07 RX ADMIN — ONDANSETRON 4 MG: 2 INJECTION INTRAMUSCULAR; INTRAVENOUS at 03:33

## 2023-10-07 RX ADMIN — ACETAMINOPHEN 650 MG: 325 TABLET, FILM COATED ORAL at 11:28

## 2023-10-07 RX ADMIN — LAMOTRIGINE 200 MG: 100 TABLET ORAL at 08:27

## 2023-10-07 RX ADMIN — MAGNESIUM OXIDE 400 MG (241.3 MG MAGNESIUM) TABLET 400 MG: TABLET at 08:26

## 2023-10-07 RX ADMIN — FLUOXETINE HYDROCHLORIDE 40 MG: 20 CAPSULE ORAL at 09:36

## 2023-10-07 RX ADMIN — KETOROLAC TROMETHAMINE 15 MG: 15 INJECTION, SOLUTION INTRAMUSCULAR; INTRAVENOUS at 08:27

## 2023-10-07 RX ADMIN — REGADENOSON 0.4 MG: 0.08 INJECTION, SOLUTION INTRAVENOUS at 12:45

## 2023-10-07 RX ADMIN — Medication 1 TABLET: at 08:26

## 2023-10-07 RX ADMIN — ATORVASTATIN CALCIUM 40 MG: 20 TABLET, FILM COATED ORAL at 11:28

## 2023-10-07 RX ADMIN — FAMOTIDINE 40 MG: 20 TABLET, FILM COATED ORAL at 08:28

## 2023-10-07 NOTE — PLAN OF CARE
Goal Outcome Evaluation:  Plan of Care Reviewed With: patient        Progress: improving     Patient with continued complains of dull chest pain and chronic back pain.
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
## 2023-10-13 LAB
MYCOBACTERIUM SPEC CULT: NORMAL
NIGHT BLUE STAIN TISS: NORMAL

## 2023-10-20 LAB
MYCOBACTERIUM SPEC CULT: NORMAL
NIGHT BLUE STAIN TISS: NORMAL

## 2023-12-14 ENCOUNTER — APPOINTMENT (OUTPATIENT)
Dept: WOMENS IMAGING | Facility: HOSPITAL | Age: 58
End: 2023-12-14
Payer: MEDICARE

## 2023-12-14 PROCEDURE — 77063 BREAST TOMOSYNTHESIS BI: CPT | Performed by: RADIOLOGY

## 2023-12-14 PROCEDURE — 77067 SCR MAMMO BI INCL CAD: CPT | Performed by: RADIOLOGY

## 2024-09-04 ENCOUNTER — HOSPITAL ENCOUNTER (EMERGENCY)
Facility: HOSPITAL | Age: 59
Discharge: HOME | End: 2024-09-04
Attending: EMERGENCY MEDICINE
Payer: MEDICARE

## 2024-09-04 VITALS
HEART RATE: 87 BPM | SYSTOLIC BLOOD PRESSURE: 127 MMHG | RESPIRATION RATE: 16 BRPM | OXYGEN SATURATION: 98 % | TEMPERATURE: 99 F | DIASTOLIC BLOOD PRESSURE: 80 MMHG

## 2024-09-04 DIAGNOSIS — M54.16 PAIN, RADICULAR, LUMBAR: Primary | ICD-10-CM

## 2024-09-04 PROCEDURE — 25010000002 HYDROMORPHONE 1 MG/ML SOLUTION: Performed by: EMERGENCY MEDICINE

## 2024-09-04 PROCEDURE — 96372 THER/PROPH/DIAG INJ SC/IM: CPT

## 2024-09-04 PROCEDURE — 99282 EMERGENCY DEPT VISIT SF MDM: CPT

## 2024-09-04 RX ORDER — PREDNISONE 50 MG/1
50 TABLET ORAL DAILY
Qty: 7 TABLET | Refills: 0 | Status: SHIPPED | OUTPATIENT
Start: 2024-09-04

## 2024-09-04 RX ADMIN — HYDROMORPHONE HYDROCHLORIDE 1 MG: 1 INJECTION, SOLUTION INTRAMUSCULAR; INTRAVENOUS; SUBCUTANEOUS at 18:26
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
## 2024-09-04 NOTE — ED NOTES
Patient to ER via car from home able to walk to triage for L hip/back leg pain    Patient had epidural in aug for this same pain    [facility]

## 2024-09-04 NOTE — ED PROVIDER NOTES
EMERGENCY DEPARTMENT MD ATTESTATION NOTE    Room Number:  S03/03  PCP: Eula Franco APRN  Independent Historians: Patient    HPI:  A complete HPI/ROS/PMH/PSH/SH/FH are unobtainable due to: None    Chronic or social conditions impacting patient care (Social Determinants of Health): None      Context: Annemarie Ha is a 59 y.o. female with a medical history of degenerative disc disease, chronic pain, hypokalemia, MS who presents to the ED c/o acute left back and leg pain.  The patient reports for the last 6 to 8 weeks she has had pain in her back on the left as well as left leg pain.  She denies any fall or injury.  She reports that she has seen pain management and they have done an injection.  She reports persistent pain.  She reports it is more severe today.  She denies any fall or injury.        Review of prior external notes (non-ED) -and- Review of prior external test results outside of this encounter:  Laboratory evaluation 7/25/2024 shows normal CBC.  She had a normal BMP on 10/7/2023.  She had an indeterminate troponin on 10/7/2023.    Prescription drug monitoring program review:           PHYSICAL EXAM    I have reviewed the triage vital signs and nursing notes.    ED Triage Vitals   Temp Heart Rate Resp BP SpO2   09/04/24 1558 09/04/24 1558 09/04/24 1558 09/04/24 1602 09/04/24 1558   99 °F (37.2 °C) 87 16 138/78 98 %      Temp src Heart Rate Source Patient Position BP Location FiO2 (%)   -- -- -- -- --              Physical Exam  GENERAL: Awake, alert, no acute distress  SKIN: Warm, dry  HENT: Normocephalic, atraumatic  EYES: no scleral icterus  CV: regular rhythm, regular rate  RESPIRATORY: normal effort, lungs clear  ABDOMEN: soft, nontender, nondistended  MUSCULOSKELETAL: no deformity.  Equal pedal pulses.  Normal lower extremity strength and sensation.  Tenderness along the anterior left hip, lateral left hip, posterior left back in the lumbar region  NEURO: alert, moves all extremities,  follows commands            MEDICATIONS GIVEN IN ER  Medications   HYDROmorphone (DILAUDID) injection 1 mg (1 mg Intramuscular Given 9/4/24 1826)         ORDERS PLACED DURING THIS VISIT:  No orders of the defined types were placed in this encounter.        PROCEDURES  Procedures            PROGRESS, DATA ANALYSIS, CONSULTS, AND MEDICAL DECISION MAKING  All labs have been independently interpreted by me.  All radiology studies have been reviewed by me. All EKG's have been independently viewed and interpreted by me.  Discussion below represents my analysis of pertinent findings related to patient's condition, differential diagnosis, treatment plan and final disposition.    Differential diagnosis includes but is not limited to sciatica, chronic pain, degenerative disc disease.    Clinical Scores:                   ED Course as of 09/04/24 1832   Wed Sep 04, 2024   1802 Patient presents with acute on chronic left back pain radiation to the left hip and thigh.  Patient involved in pain management.  Neurologically in tact.  Plan for IM shot of Dilaudid as well as steroids to go home.  Patient in agreement with treatment plan. [EE]      ED Course User Index  [EE] Otto Mathew PA       MDM: The patient is neurovascularly intact.  She describes pain which is worsened today but is similar to her prior chronic pain.  Plan a dose of pain medicine here and discharged home on steroids.  She is agreeable.  Do not think she needs emergent imaging today.      COMPLEXITY OF CARE  Admission was considered but after careful review of the patient's presentation, physical examination, diagnostic results, and response to treatment the patient may be safely discharged with outpatient follow-up.    Please note that portions of this document were completed with a voice recognition program.    Note Disclaimer: At [facility], we believe that sharing information builds trust and better relationships. You are receiving this note because  you recently visited [facility]. It is possible you will see health information before a provider has talked with you about it. This kind of information can be easy to misunderstand. To help you fully understand what it means for your health, we urge you to discuss this note with your provider.         Carlos Casey MD  09/04/24 3581

## 2024-11-21 ENCOUNTER — TREATMENT (OUTPATIENT)
Dept: PHYSICAL THERAPY | Facility: CLINIC | Age: 59
End: 2024-11-21
Payer: MEDICARE

## 2024-11-21 DIAGNOSIS — G89.29 CHRONIC BILATERAL LOW BACK PAIN WITH LEFT-SIDED SCIATICA: Primary | ICD-10-CM

## 2024-11-21 DIAGNOSIS — M54.42 CHRONIC BILATERAL LOW BACK PAIN WITH LEFT-SIDED SCIATICA: Primary | ICD-10-CM

## 2024-11-21 DIAGNOSIS — M79.2 NEUROGENIC PAIN OF LEFT LOWER EXTREMITY: ICD-10-CM

## 2024-11-21 DIAGNOSIS — Z78.9 IMPAIRED MOBILITY AND ACTIVITIES OF DAILY LIVING: ICD-10-CM

## 2024-11-21 DIAGNOSIS — Z74.09 IMPAIRED MOBILITY AND ACTIVITIES OF DAILY LIVING: ICD-10-CM

## 2024-11-21 PROCEDURE — 97162 PT EVAL MOD COMPLEX 30 MIN: CPT | Performed by: PHYSICAL THERAPIST

## 2024-11-21 NOTE — PROGRESS NOTES
Physical Therapy Initial Evaluation and Plan of Care    [facility]  [address] [place] [address]  [phone] (phone)  [phone] (fax)      Patient: Annemarie Ha   : 1965  Diagnosis/ICD-10 Code:  Chronic bilateral low back pain with left-sided sciatica [M54.42, G89.29]  Referring practitioner: Jitendra Oh MD  Date of Initial Visit: 2024  Today's Date: 2024  Patient seen for 1 sessions    Visit Diagnoses:    ICD-10-CM ICD-9-CM   1. Chronic bilateral low back pain with left-sided sciatica  M54.42 724.2    G89.29 724.3     338.29   2. Neurogenic pain of left lower extremity  M79.2 355.8   3. Impaired mobility and activities of daily living  Z74.09 V49.89    Z78.9               Subjective Evaluation    History of Present Illness  Onset date: May 2024.  Mechanism of injury: Long history of knee pain. She gets injections in her knees.   She was planning on having right knee surgery until she developed pain in her left thigh.  She would get sudden jerking pain in the left thigh. The pain then radiated to the left groin. Epidural injection did not help. The pain took her to the ER and Dilaudid did not help.  Steroid for 14 days helped some.  Neurologist thinks it is her femoral nerve.  She gets regular back injections and Hydrocodone. She takes 2.5 tablets of 7.5 Hydrocodone per day.  She has a regular sensation in the left buttocks. She cannot sit on low sofa.  The pain affects her sleep. She has difficulty getting in and out of car when she raises her leg.  PMH:  MS since diagnosis 2016.  She has cervical spinal stenosis that is under better control with injections and medications.  Her neck will pop when she turns driving.  She has mod/severe OA R knee and less severe L knee. She has difficulty reaching to put her shoes and socks on (worse on L); history of seizures related to her sleep (takes medication) TBI, optic neuritis  She  had West Nile virus last year with hospitalization, rehab and PT.  She lost 50 pounds and had headache for 16 weeks.      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    Social Support  Lives with: spouse    Treatments  Current treatment: injection treatment and medication  Patient Goals  Patient goals for therapy: increased motion, decreased pain, increased strength, independence with ADLs/IADLs and improved balance  Patient goal: Putting shoes and socks on, stepping over shower, getting in and out of car, fear of falling           Objective          Static Posture     Comments  Mild upper thoracic kyphosis     Palpation   Left   Hypertonic in the gluteus medius.   Tenderness of the gluteus medius.     Active Range of Motion     Lumbar   Flexion: Active lumbar flexion: Able to touch toes, pulls behind her knees.   Extension: Active lumbar extension: 2/3.   Left lateral flexion: Active left lumbar lateral flexion: 3/4.   Right lateral flexion: Active right lumbar lateral flexion: 3/4.   Left rotation: Active left lumbar rotation: 2/3.   Right rotation: Active right lumbar rotation: 1/2 strains in thoracic spine.     Additional Active Range of Motion Details  Difficulty rotating her hips to reach her left shoe/sock for dressing   Limited rotation of hips due to increased knee pain     Passive Range of Motion   Left Hip   Normal passive range of motion    Right Hip   Normal passive range of motion    Strength/Myotome Testing     Left Hip   Planes of Motion   Flexion: 4 (Wave of pain around the left thigh)  Abduction: 4    Right Hip   Planes of Motion   Flexion: 4    Left Knee   Flexion: 4-  Extension: 4    Right Knee   Flexion: 4+  Extension: 4+    Left Ankle/Foot   Dorsiflexion: 5    Right Ankle/Foot   Dorsiflexion:  5    Tests     Lumbar     Left   Negative femoral stretch and passive SLR.     Right   Negative femoral stretch and passive SLR.     Lumbar Flexibility Comments:   Left quad is mod tight but feels good to stretch   Right quad is mod tight but right knee more bothersome   Hip rotator flexibility is limited by knee pain B.    Ambulation   Weight-Bearing Status   Assistive device used: none    Observational Gait   Gait: antalgic   Decreased walking speed.     Functional Assessment     Comments  Difficulty standing up from low seats requires hand support   Difficulty with bed mobility to and from lying prone         Functional Outcome Score: Modified Oswestry score is 78% disability with highest reported disability for sitting, sleeping, personal care, walking, lifting and pain  and Neck Disability Index (NDI) score is 25 /50 that is 50% disability with highest reported disability for recreation, lifting, personal care and sleeping        Assessment & Plan       Assessment  Impairments: abnormal gait, abnormal muscle tone, abnormal or restricted ROM, activity intolerance, impaired balance, impaired physical strength, lacks appropriate home exercise program and pain with function   Functional limitations: lifting, sleeping, walking, uncomfortable because of pain and sitting   Assessment details: Annemarie Ha is a 59 y.o. female referred to physical therapy for lumbar and cervical spondylosis and stenosis. She was only evaluated today for the lumbar condition that became more painful in May 2024 with new radiating pain and jerking in the left thigh. She presents with a evolving clinical presentation because this pain is still rated high and impairs her daily functional mobility  She has comorbidities of MS, bilateral knee pain and she had been planning to get a right TKA before the left leg pain started, and a history of seizures and no known personal factors that may affect her progress in the plan of care.   Signs and symptoms are consistent with physical therapy diagnosis of chronic lower back pain with a new left leg neuropathic pain.  This pain limits her left hip mobility for dressing, transfers in and out of the shower and car.and has created a fear of falling.  She will benefit from physical therapy rehab for her back and left leg.  Prognosis: good    Goals  Plan Goals: STGs to be met by: 01/02/25    STG 1 Patient will demonstrate an independent HEP for lumbar and hip strength and ROM/flexibility that she can perform without limiting pain     STG 2 Patient will be independent with good back postures and body mechanics to better support spine with ADLs    STG 3 Patient will increase her left hip strength to 4+/5 without pain so that she can lift her hip to confidently get in her shower with secure balance    LTGs to be achieved by: 02/19/25    LTG 1 Patient will increase her left hip rotational AROM to WFL so she can get her own shoes and socks on with minimal pain rated 3/10 or less     LTG 2 Patient will report decreased pain in the left thigh from 9/10 to 3/10 or less at worst and without jerking symptoms that stopped her ability to pursue right knee surgery     LTG 3 Patient will report decreased functional disability on Modified Oswestry score from 78 % to 68 % or less that is specifically improved for sitting so that she can tolerate sitting on her sofa     Plan  Therapy options: will be seen for skilled therapy services  Planned modality interventions: electrical stimulation/Russian stimulation, TENS and ultrasound  Other planned modality interventions: aquatic therapy, group therapy  Planned therapy interventions: abdominal trunk stabilization, neuromuscular re-education, manual therapy, body mechanics training, flexibility, functional ROM exercises, home exercise program, therapeutic activities and strengthening  Frequency: 2x week  Duration in weeks: 12  Treatment plan discussed with: patient  Plan  details: Stretching left quad (prone) and left hip rotators without irritating knee  Back rehab   Glute soft tissue         Timed:         Manual Therapy:    0     mins  49363;     Therapeutic Exercise:    0     mins  81979;     Neuromuscular Carroll:    0    mins  73350;    Therapeutic Activity:     0     mins  84393;     Gait Trainin     mins  72446;     Ultrasound:     0     mins  73701;    Self Care                       0     mins   49195      Un-Timed:  Electrical Stimulation:    0     mins  88662 ( );  Dry Needling  (1-2 muscles)            0     mins 69282 (Self-pay)  Dry Needling (3-4 muscles) 0     mins 32941 (Self-pay)  Dry Needling Trial    0     mins DRYNDLTRIAL  (No Charge)  Traction     0     mins 12576  Low Eval     0     Mins  91652  Mod Eval     42     Mins  95261  High Eval                       0     Mins  10516    Timed Treatment:   0   mins   Total Treatment:     42   mins    PT SIGNATURE: RUPALI Cooper License Number: [id number]    Electronically signed by Taty Kemp PT, 24, 3:02 PM EST    DATE TREATMENT INITIATED: 2024    Medicare Initial Certification  Certification Period: 2025  I certify that the therapy services are furnished while this patient is under my care.  The services outlined above are required by this patient, and will be reviewed every 90 days.     PHYSICIAN: Jitendra Oh MD   NPI: [id number]                                         DATE:     Please sign and return via fax to [phone] Thank you, [facility].

## 2024-12-02 ENCOUNTER — APPOINTMENT (OUTPATIENT)
Dept: GENERAL RADIOLOGY | Facility: HOSPITAL | Age: 59
End: 2024-12-02
Payer: MEDICARE

## 2024-12-02 ENCOUNTER — HOSPITAL ENCOUNTER (EMERGENCY)
Facility: HOSPITAL | Age: 59
Discharge: HOME | End: 2024-12-02
Attending: STUDENT IN AN ORGANIZED HEALTH CARE EDUCATION/TRAINING PROGRAM | Admitting: STUDENT IN AN ORGANIZED HEALTH CARE EDUCATION/TRAINING PROGRAM
Payer: MEDICARE

## 2024-12-02 VITALS
BODY MASS INDEX: 37.2 KG/M2 | WEIGHT: 231.48 LBS | RESPIRATION RATE: 18 BRPM | HEART RATE: 87 BPM | OXYGEN SATURATION: 99 % | DIASTOLIC BLOOD PRESSURE: 84 MMHG | TEMPERATURE: 97 F | SYSTOLIC BLOOD PRESSURE: 140 MMHG | HEIGHT: 66 IN

## 2024-12-02 DIAGNOSIS — M25.561 ACUTE PAIN OF RIGHT KNEE: Primary | ICD-10-CM

## 2024-12-02 PROCEDURE — 25010000002 KETOROLAC TROMETHAMINE PER 15 MG: Performed by: STUDENT IN AN ORGANIZED HEALTH CARE EDUCATION/TRAINING PROGRAM

## 2024-12-02 PROCEDURE — 73560 X-RAY EXAM OF KNEE 1 OR 2: CPT

## 2024-12-02 PROCEDURE — 99283 EMERGENCY DEPT VISIT LOW MDM: CPT

## 2024-12-02 PROCEDURE — 63710000001 PREDNISONE PER 1 MG: Performed by: STUDENT IN AN ORGANIZED HEALTH CARE EDUCATION/TRAINING PROGRAM

## 2024-12-02 PROCEDURE — 96372 THER/PROPH/DIAG INJ SC/IM: CPT

## 2024-12-02 RX ORDER — PREDNISONE 20 MG/1
60 TABLET ORAL ONCE
Status: COMPLETED | OUTPATIENT
Start: 2024-12-02 | End: 2024-12-02

## 2024-12-02 RX ORDER — PREDNISONE 20 MG/1
20 TABLET ORAL DAILY
Qty: 5 TABLET | Refills: 0 | Status: SHIPPED | OUTPATIENT
Start: 2024-12-02 | End: 2024-12-07

## 2024-12-02 RX ORDER — KETOROLAC TROMETHAMINE 30 MG/ML
30 INJECTION, SOLUTION INTRAMUSCULAR; INTRAVENOUS ONCE
Status: COMPLETED | OUTPATIENT
Start: 2024-12-02 | End: 2024-12-02

## 2024-12-02 RX ADMIN — PREDNISONE 60 MG: 20 TABLET ORAL at 11:33

## 2024-12-02 RX ADMIN — KETOROLAC TROMETHAMINE 30 MG: 30 INJECTION, SOLUTION INTRAMUSCULAR at 10:29
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
## 2024-12-02 NOTE — DISCHARGE INSTRUCTIONS
Please follow-up with your primary care physician within 1 week for repeat evaluation    Please follow-up with your orthopedist at your earliest possible appointment    Please take all medications as prescribed    Please return to the ER with new or worsening symptoms including but not limited to inability to ambulate, fever, chills, shortness of breath, chest pain

## 2024-12-02 NOTE — ED NOTES
Pt complains of swelling to her right knee that started yesterday evening. States that she has used and ice and elevation with minimal relief. Complains of increased pain. States that she has osteoarthritis.

## 2024-12-16 ENCOUNTER — APPOINTMENT (OUTPATIENT)
Dept: WOMENS IMAGING | Facility: HOSPITAL | Age: 59
End: 2024-12-16
Payer: MEDICARE

## 2024-12-16 PROCEDURE — 77063 BREAST TOMOSYNTHESIS BI: CPT | Performed by: RADIOLOGY

## 2024-12-16 PROCEDURE — 77067 SCR MAMMO BI INCL CAD: CPT | Performed by: RADIOLOGY

## 2025-03-12 ENCOUNTER — TREATMENT (OUTPATIENT)
Dept: PHYSICAL THERAPY | Facility: CLINIC | Age: 60
End: 2025-03-12
Payer: MEDICARE

## 2025-03-12 DIAGNOSIS — M54.42 CHRONIC BILATERAL LOW BACK PAIN WITH LEFT-SIDED SCIATICA: Primary | ICD-10-CM

## 2025-03-12 DIAGNOSIS — Z74.09 IMPAIRED FUNCTIONAL MOBILITY AND ACTIVITY TOLERANCE: ICD-10-CM

## 2025-03-12 DIAGNOSIS — G89.29 CHRONIC BILATERAL LOW BACK PAIN WITH LEFT-SIDED SCIATICA: Primary | ICD-10-CM

## 2025-03-12 PROCEDURE — 97164 PT RE-EVAL EST PLAN CARE: CPT | Performed by: PHYSICAL THERAPIST

## 2025-03-12 PROCEDURE — 97110 THERAPEUTIC EXERCISES: CPT | Performed by: PHYSICAL THERAPIST

## 2025-03-12 NOTE — PROGRESS NOTES
Physical Therapy Re-evaluation     [facility]  [address] [place] [address]  [phone] (phone)  [phone] (fax)      Patient: Annemarie Ha   : 1965  Diagnosis/ICD-10 Code:  Chronic bilateral low back pain with left-sided sciatica [M54.42, G89.29]  Referring practitioner: Jitendra Oh MD  Date of Initial Visit: Type: THERAPY  Noted: 2024  Today's Date: 3/12/2025  Patient seen for 2 sessions    Visit Diagnoses:    ICD-10-CM ICD-9-CM   1. Chronic bilateral low back pain with left-sided sciatica  M54.42 724.2    G89.29 724.3     338.29   2. Impaired functional mobility and activity tolerance  Z74.09 V49.89         Subjective:     Clinical Progress: improved  Home Program Compliance: Yes  Treatment has included:  therapeutic exercise and patient education with home exercise program     Subjective Evaluation    History of Present Illness  Mechanism of injury: She is scheduled for R TKA 4/10/25 at [facility]. She has been doing sheet of exercises twice a day.  She is scheduled to see a new neurologist for the MS and seizures 25  The knee pain is now worse than the lower back pain.  Her back will be guarded when she stands up after sitting 30-60 min. She still has the pain in the left buttocks.  She has started Zepbound 2 weeks ago.  She is sleeping better on new mattress.  Putting on shoes and socks is better.  She still has difficulty stepping over shower, getting in and out of car and still has some fear of falling.  She is currently taking Hydrocodone but hopes to get off it prior to knee surgery.  She had lumbar epidural 2 months ago.  PMH:  Absent seizures, MS        Pain  Current pain ratin (Left glute and back of thigh)  At best pain ratin (Lying on new bed)  At worst pain ratin  Quality: stabbing.    Social Support  Lives in: apartment  Lives with: spouse         Objective          Active Range of Motion     Lumbar    Flexion: WFL  Extension: Active lumbar extension: 1/2.   Left lateral flexion: Active left lumbar lateral flexion: 3/4.   Right lateral flexion: Active right lumbar lateral flexion: 2/3.   Left rotation: WFL  Right rotation: WFL  Left Knee   Flexion: 118 degrees with pain  Extension: 0 degrees     Right Knee   Flexion: 122 degrees   Extension: 0 degrees     Strength/Myotome Testing     Left Hip   Planes of Motion   Flexion: 4+    Right Hip   Planes of Motion   Flexion: 5    Left Knee   Flexion: 4  Extension: 5    Right Knee   Flexion: 5  Extension: 5    Left Ankle/Foot   Dorsiflexion: 5  Plantar flexion: 4+    Right Ankle/Foot   Dorsiflexion: 5  Plantar flexion: 4+    Ambulation   Weight-Bearing Status   Assistive device used: none    Functional Assessment     Single Leg Stance   Left: 2 seconds  Right: 2 seconds      Reviewed back exercises as well as the incorporation of the knee exercises that she is performing pre-operatively  Ankle pumps  Quad sets  Straight leg raises (contract abdominals)  Glute squeezes (contract abdominals)  Heel slides  Bridges  Knee to chest (hold under the knee)  May start walking on treadmill as tolerated    Functional Outcome Score: Modified Oswestry score is 48% disability with highest reported disability for lifting, walking, social life and pain      Assessment & Plan       Assessment  Impairments: abnormal gait, abnormal muscle tone, abnormal or restricted ROM, activity intolerance, impaired balance, impaired physical strength, lacks appropriate home exercise program and pain with function   Functional limitations: lifting, sleeping, walking, uncomfortable because of pain and sitting   Assessment details: Annemarie Ha is a 60 y.o. female referred to physical therapy for lumbar and cervical spondylosis and stenosis. She was evaluated for the lumbar condition on 11/21/24 and she returns today for the second session.  The acute pain in the front of the left hip and thigh  have resolved so she is now able to have her right TKA scheduled for 4/11/25. She continues to have chronic pain of the lower back that goes down the left buttocks and thigh. This pain is stabbing and rated up to 9/10. She has comorbidities of MS, bilateral knee pain and a history of seizures.  Signs and symptoms are consistent with physical therapy diagnosis of chronic lower back pain with a left sciatica and neuropathic pain.  This pain in her back and her knees limits her with transfers in and out of the shower and car.and she still has a fear of falling.  She will benefit from physical therapy rehab for her back and legs for the next 4 weeks to help her go into right knee surgery in better condition.   Prognosis: good    Goals  Plan Goals: STGs to be met by: 04/10/25    STG 1 Patient will demonstrate an independent HEP for lumbar and hip strength and ROM/flexibility that she can perform without limiting pain     STG 2 Patient will be independent with good back postures and body mechanics to better support spine with ADLs    STG 3 Patient will increase her left hip and knee strength to 4+/5 without pain so that she can lift her hip to confidently get in her shower with secure balance    STG 4 Patient will report decreased functional disability on Modified Oswestry score from 48 % to 42 % or less improved for changing degree of pain, and lifting     LTGs to be achieved by: Deferred due to planned knee surgery       Plan  Therapy options: will be seen for skilled therapy services  Planned modality interventions: electrical stimulation/Russian stimulation  Planned therapy interventions: abdominal trunk stabilization, neuromuscular re-education, manual therapy, body mechanics training, flexibility, functional ROM exercises, home exercise program, therapeutic activities and strengthening  Frequency: 1x week  Duration in weeks: 4  Treatment plan discussed with: patient  Plan details: Back rehab   Glute soft tissue            Recommendations: Continue as planned  Timeframe: 1 month  Prognosis to achieve goals: good    PT Signature: Taty Kemp PT    [place] License Number: [id number]    Electronically signed by Taty Kemp PT, 25, 4:03 PM EDT      Based upon review of the patient's progress and continued therapy plan, it is my medical opinion that Annemarie Ha should continue physical therapy treatment at [facility]  [address] [place] [address] [phone]. office phone  [phone] office fax      Re-evaluation  Certification Period: 6/10/2025  I certify that the therapy services are furnished while this patient is under my care.  The services outlined above are required by this patient, and will be reviewed every 90 days.     PHYSICIAN: Jitendra Oh MD   NPI: [id number]                                         DATE:     Signature: __________________________________  Jitendra Oh MD      Please sign and return via fax to [phone]   Thank you, [facility].    Timed:  Manual Therapy:    0     mins  49010;  Therapeutic Exercise:    25     mins  26042;     Neuromuscular [place]:    0    mins  61845;    Therapeutic Activity:     0     mins  21720;     Gait Trainin     mins  59488;     Ultrasound:     0     mins  43162;    Orthotic Mgmt/training          mins 89676;  Orthotic check out          mins 18921;  Aquatic Therapy          mins 45274;  Self Care                             mins   23098      Untimed:  Electrical Stimulation:            mins  78757 ( );  Traction:           mins  12799;   Dry Needling  (1-2 muscles)       _____  mins 00530 (Self-pay)  Dry Needling (3-4 muscles)            mins 18090 (Self-pay)  Dry Needling Trial           mins DRYNDLTRIAL  (No Charge)  Canalith Repositioning           mins 02181;  Re-evaluation             __30___ mins 43585    :  Timed Treatment:   25    mins   Total Treatment:     55   mins

## 2025-06-03 ENCOUNTER — OFFICE VISIT (OUTPATIENT)
Dept: NEUROLOGY | Facility: CLINIC | Age: 60
End: 2025-06-03
Payer: MEDICARE

## 2025-06-03 VITALS
OXYGEN SATURATION: 98 % | SYSTOLIC BLOOD PRESSURE: 120 MMHG | HEART RATE: 75 BPM | DIASTOLIC BLOOD PRESSURE: 76 MMHG | BODY MASS INDEX: 36.35 KG/M2 | HEIGHT: 66 IN | WEIGHT: 226.2 LBS

## 2025-06-03 DIAGNOSIS — G35 MULTIPLE SCLEROSIS: Primary | ICD-10-CM

## 2025-06-03 DIAGNOSIS — Z87.898 HISTORY OF SEIZURES: ICD-10-CM

## 2025-06-03 LAB
25(OH)D3+25(OH)D2 SERPL-MCNC: 31.6 NG/ML (ref 30–100)
VIT B12 SERPL-MCNC: 758 PG/ML (ref 211–946)

## 2025-06-03 NOTE — LETTER
"Yuli 3, 2025     SHERMAN Segovia  [address]  [address]  [place] [id number]    Patient: Annemarie Ha   YOB: 1965   Date of Visit: 6/3/2025     Dear SHERMAN Segovia:       Thank you for referring Annemarie Ha to me for evaluation. Below are the relevant portions of my assessment and plan of care.    If you have questions, please do not hesitate to call me. I look forward to following Annemarie along with you.         Sincerely,        Kris Nunez II, MD        CC: No Recipients    Kris Nunez II, MD  06/03/25 1000  Sign when Signing Visit  Chief Complaint   Patient presents with   • Multiple Sclerosis   • Seizures     NP       Patient ID: Annemarie Ha is a 60 y.o. female.    HPI:  I have had the pleasure of seeing your patient today.  As you may know she is a 60-year-old female here for the evaluation and treatment of MS and seizures.  She has been seen by multiple neurologists here locally.  She states that she originally had onset of MS symptoms back in 2002.  She described having \"optic neuritis\".  At that time she had MRI imaging and was told that she could have MS.  They followed her with serial MRI imaging.  In 2015 she was experiencing some balance issues and fatigue.  She sought the care of neurology once again.  Lumbar puncture was performed around that time.  She states that she was told that she had MS at that time.  She was started on Copaxone however after experiencing side effects she was switched to oral medication.  She is not sure which when she tried first however she has tried Gilenya as well as possibly Tecfidera.  She apparently incurred a head injury back in 1998.  She had an orbital fracture.  A year later she says that she had a \"grand mall seizure\".  She has had a total of 4 of them.  The last 1 being in 2005.  She was started on Dilantin for those episodes and switched to Keppra at some point.  He has not had any seizure-like " "episodes for the past several years.  However she says that she was having \"moments\" where she would \"turn in a [place]\".  She would \"stare\".  She has had EEGs as well as epilepsy monitoring.  Her last EEG was a long-term study in 2022.  No seizures were captured however interictal EEG findings were indicative of a lowered seizure threshold with a propensity toward focal onset of seizure activity arising from the left temporal region.  She is currently not on any disease modifying therapy for MS.  She was  receiving Ocrevus infusions every 6 months however her last 1 was about 7 to 8 months ago.  She says that she was diagnosed with hyperal immunoglobulin anemia.  She does take Lamictal 100 mg 2 tablets twice a day.  She does acknowledge a history of significant PTSD.    The following portions of the patient's history were reviewed and updated as appropriate: allergies, current medications, past family history, past medical history, past social history, past surgical history and problem list.    Review of Systems   Constitutional:  Positive for fatigue.   HENT:  Negative for ear pain, hearing loss, nosebleeds and trouble swallowing.    Eyes:  Negative for photophobia, pain and visual disturbance.   Musculoskeletal:  Positive for gait problem (balance problem off & on) and myalgias.   Allergic/Immunologic: Negative for food allergies.   Neurological:  Positive for seizures (2016 last episode) and weakness (general). Negative for dizziness, syncope, numbness and headaches.   Psychiatric/Behavioral:  Positive for sleep disturbance. Negative for confusion and decreased concentration. The patient is nervous/anxious.       I have reviewed the review of systems above performed by my medical assistant.      Vitals:    06/03/25 0823   BP: 120/76   Pulse: 75   SpO2: 98%       Neurological Exam  Mental Status  Awake, alert and oriented to person, place and time. Recent and remote memory are intact. Speech is normal. Language is " fluent with no aphasia. Attention and concentration are normal. Fund of knowledge is appropriate for level of education.    Cranial Nerves  CN I: Sense of smell is normal.  CN II: Visual acuity is normal.  CN III, IV, VI: Extraocular movements intact bilaterally. Pupils equal round and reactive to light bilaterally.  CN V: Facial sensation is normal.  CN VII: Full and symmetric facial movement.  CN XI: Shoulder shrug strength is normal.  CN XII: Tongue midline without atrophy or fasciculations.    Motor  Normal muscle bulk throughout. No fasciculations present. Normal muscle tone. No abnormal involuntary movements. No pronator drift.                                             Right                     Left  Rhomboids                            5                          5  Infraspinatus                          5                          5  Supraspinatus                       5                          5  Deltoid                                   5                          5   Biceps                                   5                          5  Brachioradialis                      5                          5   Triceps                                  5                          5   Pronator                                5                          5   Supinator                              5                           5   Wrist flexor                            5                          5   Wrist extensor                       5                          5   Finger flexor                          5                          5   Finger extensor                     5                          5   Interossei                              5                          5   Abductor pollicis brevis         5                          5   Flexor pollicis brevis             5                          5   Opponens pollicis                 5                          5  Extensor digitorum               5                           5  Abductor digiti minimi           5                          5   Abdominal                            5                          5  Glutei                                    5                          5  Hip abductor                         5                          5  Hip adductor                         5                          5   Iliopsoas                               5                          5   Quadriceps                           5                          5   Hamstring                             5                          5   Gastrocnemius                     5                           5   Anterior tibialis                      5                          5   Posterior tibialis                    5                          5   Peroneal                               5                          5  Ankle dorsiflexor                   5                          5  Ankle plantar flexor              5                           5  Extensor hallucis longus      5                           5    Sensory  Sensation is intact to light touch, pinprick, vibration and proprioception in all four extremities.    Reflexes  Deep tendon reflexes are 2+ and symmetric in all four extremities.    Right pathological reflexes: Araseli's absent.  Left pathological reflexes: Araseli's absent.    Coordination    Finger-to-nose, rapid alternating movements and heel-to-shin normal bilaterally without dysmetria.    Gait  Normal casual, toe, heel and tandem gait.       Physical Exam  Vitals reviewed.   Constitutional:       General: She is not in acute distress.     Appearance: She is well-developed.   HENT:      Head: Normocephalic and atraumatic.   Eyes:      Extraocular Movements: Extraocular movements intact.      Pupils: Pupils are equal, round, and reactive to light.   Cardiovascular:      Rate and Rhythm: Normal rate and regular rhythm.      Heart sounds: Normal heart sounds.   Pulmonary:      Effort: Pulmonary effort is  normal. No respiratory distress.      Breath sounds: Normal breath sounds.   Abdominal:      General: Bowel sounds are normal. There is no distension.      Palpations: Abdomen is soft.      Tenderness: There is no abdominal tenderness.   Musculoskeletal:         General: No deformity.      Cervical back: Normal range of motion.   Skin:     General: Skin is warm.      Findings: No rash.   Neurological:      Coordination: Coordination is intact.      Deep Tendon Reflexes: Reflexes are normal and symmetric.   Psychiatric:         Speech: Speech normal.         Judgment: Judgment normal.         Procedures    Assessment/Plan: She has had stable MRI imaging over the years.  I feel that it is less likely that she would need disease modifying therapy at this time.  However I would like to schedule her for an MRI of her brain, cervical and thoracic spine with and without contrast.  Would like to check vitamin B12 and vitamin D levels today.  Continue Lamictal as scheduled.  A total of 60 minutes was spent face-to-face with the patient today.  Of that greater than 50% of this time was spent discussing signs and symptoms of MS, seizures, patient education, plan of care and prognosis.         Diagnoses and all orders for this visit:    1. Multiple sclerosis (Primary)  -     Vitamin B12  -     Vitamin D,25-Hydroxy  -     MRI Brain With & Without Contrast; Future  -     MRI Cervical Spine With & Without Contrast; Future  -     MRI Thoracic Spine With & Without Contrast; Future    2. History of seizures  -     MRI Brain With & Without Contrast; Future           Kris Nunez II, MD

## 2025-06-05 ENCOUNTER — PATIENT ROUNDING (BHMG ONLY) (OUTPATIENT)
Dept: NEUROLOGY | Facility: CLINIC | Age: 60
End: 2025-06-05
Payer: MEDICARE

## 2025-06-23 ENCOUNTER — PATIENT MESSAGE (OUTPATIENT)
Dept: NEUROLOGY | Facility: CLINIC | Age: 60
End: 2025-06-23
Payer: MEDICARE

## 2025-06-23 DIAGNOSIS — Z87.898 HISTORY OF SEIZURES: Primary | ICD-10-CM

## 2025-06-23 RX ORDER — LAMOTRIGINE 100 MG/1
200 TABLET ORAL 2 TIMES DAILY
Qty: 120 TABLET | Refills: 3 | Status: SHIPPED | OUTPATIENT
Start: 2025-06-23

## 2025-07-28 ENCOUNTER — HOSPITAL ENCOUNTER (OUTPATIENT)
Dept: MRI IMAGING | Facility: HOSPITAL | Age: 60
Discharge: HOME | End: 2025-07-28
Payer: MEDICARE

## 2025-07-28 DIAGNOSIS — Z87.898 HISTORY OF SEIZURES: ICD-10-CM

## 2025-07-28 DIAGNOSIS — G35 MULTIPLE SCLEROSIS: ICD-10-CM

## 2025-08-27 ENCOUNTER — HOSPITAL ENCOUNTER (OUTPATIENT)
Dept: MRI IMAGING | Facility: HOSPITAL | Age: 60
Discharge: HOME | End: 2025-08-27
Payer: MEDICARE

## 2025-08-27 ENCOUNTER — TELEPHONE (OUTPATIENT)
Dept: NEUROLOGY | Facility: CLINIC | Age: 60
End: 2025-08-27
Payer: MEDICARE